# Patient Record
Sex: MALE | Race: AMERICAN INDIAN OR ALASKA NATIVE | NOT HISPANIC OR LATINO | URBAN - METROPOLITAN AREA
[De-identification: names, ages, dates, MRNs, and addresses within clinical notes are randomized per-mention and may not be internally consistent; named-entity substitution may affect disease eponyms.]

---

## 2024-09-07 ENCOUNTER — INPATIENT (INPATIENT)
Facility: HOSPITAL | Age: 49
LOS: 10 days | Discharge: NURSING, REHAB OR LONG-TERM CARE | DRG: 195 | End: 2024-09-18
Attending: INTERNAL MEDICINE | Admitting: STUDENT IN AN ORGANIZED HEALTH CARE EDUCATION/TRAINING PROGRAM
Payer: MEDICAID

## 2024-09-07 VITALS
SYSTOLIC BLOOD PRESSURE: 152 MMHG | WEIGHT: 149.91 LBS | HEART RATE: 93 BPM | RESPIRATION RATE: 25 BRPM | HEIGHT: 67 IN | DIASTOLIC BLOOD PRESSURE: 80 MMHG | OXYGEN SATURATION: 100 % | TEMPERATURE: 98 F

## 2024-09-07 DIAGNOSIS — J18.9 PNEUMONIA, UNSPECIFIED ORGANISM: ICD-10-CM

## 2024-09-07 LAB
ADD ON TEST-SPECIMEN IN LAB: SIGNIFICANT CHANGE UP
ALBUMIN SERPL ELPH-MCNC: 3.6 G/DL — SIGNIFICANT CHANGE UP (ref 3.3–5)
ALP SERPL-CCNC: 122 U/L — HIGH (ref 40–120)
ALT FLD-CCNC: 15 U/L — SIGNIFICANT CHANGE UP (ref 10–45)
ANION GAP SERPL CALC-SCNC: 13 MMOL/L — SIGNIFICANT CHANGE UP (ref 5–17)
APPEARANCE UR: CLEAR — SIGNIFICANT CHANGE UP
APTT BLD: 40.5 SEC — HIGH (ref 24.5–35.6)
AST SERPL-CCNC: 17 U/L — SIGNIFICANT CHANGE UP (ref 10–40)
BACTERIA # UR AUTO: NEGATIVE /HPF — SIGNIFICANT CHANGE UP
BASOPHILS # BLD AUTO: 0.09 K/UL — SIGNIFICANT CHANGE UP (ref 0–0.2)
BASOPHILS NFR BLD AUTO: 1.4 % — SIGNIFICANT CHANGE UP (ref 0–2)
BILIRUB SERPL-MCNC: 0.6 MG/DL — SIGNIFICANT CHANGE UP (ref 0.2–1.2)
BILIRUB UR-MCNC: NEGATIVE — SIGNIFICANT CHANGE UP
BUN SERPL-MCNC: 35 MG/DL — HIGH (ref 7–23)
CALCIUM SERPL-MCNC: 8.6 MG/DL — SIGNIFICANT CHANGE UP (ref 8.4–10.5)
CAST: 6 /LPF — HIGH (ref 0–4)
CHLORIDE SERPL-SCNC: 111 MMOL/L — HIGH (ref 96–108)
CO2 SERPL-SCNC: 17 MMOL/L — LOW (ref 22–31)
COLOR SPEC: YELLOW — SIGNIFICANT CHANGE UP
CREAT SERPL-MCNC: 3.27 MG/DL — HIGH (ref 0.5–1.3)
DIFF PNL FLD: NEGATIVE — SIGNIFICANT CHANGE UP
EGFR: 22 ML/MIN/1.73M2 — LOW
EOSINOPHIL # BLD AUTO: 0.56 K/UL — HIGH (ref 0–0.5)
EOSINOPHIL NFR BLD AUTO: 9 % — HIGH (ref 0–6)
FLUAV AG NPH QL: SIGNIFICANT CHANGE UP
FLUBV AG NPH QL: SIGNIFICANT CHANGE UP
GAS PNL BLDV: SIGNIFICANT CHANGE UP
GLUCOSE SERPL-MCNC: 105 MG/DL — HIGH (ref 70–99)
GLUCOSE UR QL: NEGATIVE MG/DL — SIGNIFICANT CHANGE UP
HCT VFR BLD CALC: 27.2 % — LOW (ref 39–50)
HGB BLD-MCNC: 8.5 G/DL — LOW (ref 13–17)
IMM GRANULOCYTES NFR BLD AUTO: 0.3 % — SIGNIFICANT CHANGE UP (ref 0–0.9)
INR BLD: 1.04 RATIO — SIGNIFICANT CHANGE UP (ref 0.85–1.18)
KETONES UR-MCNC: NEGATIVE MG/DL — SIGNIFICANT CHANGE UP
LEUKOCYTE ESTERASE UR-ACNC: NEGATIVE — SIGNIFICANT CHANGE UP
LYMPHOCYTES # BLD AUTO: 1.16 K/UL — SIGNIFICANT CHANGE UP (ref 1–3.3)
LYMPHOCYTES # BLD AUTO: 18.6 % — SIGNIFICANT CHANGE UP (ref 13–44)
MCHC RBC-ENTMCNC: 27.8 PG — SIGNIFICANT CHANGE UP (ref 27–34)
MCHC RBC-ENTMCNC: 31.3 GM/DL — LOW (ref 32–36)
MCV RBC AUTO: 88.9 FL — SIGNIFICANT CHANGE UP (ref 80–100)
MONOCYTES # BLD AUTO: 0.72 K/UL — SIGNIFICANT CHANGE UP (ref 0–0.9)
MONOCYTES NFR BLD AUTO: 11.5 % — SIGNIFICANT CHANGE UP (ref 2–14)
NEUTROPHILS # BLD AUTO: 3.69 K/UL — SIGNIFICANT CHANGE UP (ref 1.8–7.4)
NEUTROPHILS NFR BLD AUTO: 59.2 % — SIGNIFICANT CHANGE UP (ref 43–77)
NITRITE UR-MCNC: NEGATIVE — SIGNIFICANT CHANGE UP
NRBC # BLD: 0 /100 WBCS — SIGNIFICANT CHANGE UP (ref 0–0)
PH UR: 6 — SIGNIFICANT CHANGE UP (ref 5–8)
PLATELET # BLD AUTO: 199 K/UL — SIGNIFICANT CHANGE UP (ref 150–400)
POTASSIUM SERPL-MCNC: 5 MMOL/L — SIGNIFICANT CHANGE UP (ref 3.5–5.3)
POTASSIUM SERPL-SCNC: 5 MMOL/L — SIGNIFICANT CHANGE UP (ref 3.5–5.3)
PROT SERPL-MCNC: 6.8 G/DL — SIGNIFICANT CHANGE UP (ref 6–8.3)
PROT UR-MCNC: 300 MG/DL
PROTHROM AB SERPL-ACNC: 11.4 SEC — SIGNIFICANT CHANGE UP (ref 9.5–13)
RBC # BLD: 3.06 M/UL — LOW (ref 4.2–5.8)
RBC # FLD: 15.3 % — HIGH (ref 10.3–14.5)
RBC CASTS # UR COMP ASSIST: 1 /HPF — SIGNIFICANT CHANGE UP (ref 0–4)
REVIEW: SIGNIFICANT CHANGE UP
RSV RNA NPH QL NAA+NON-PROBE: SIGNIFICANT CHANGE UP
SARS-COV-2 RNA SPEC QL NAA+PROBE: DETECTED
SODIUM SERPL-SCNC: 141 MMOL/L — SIGNIFICANT CHANGE UP (ref 135–145)
SP GR SPEC: 1.02 — SIGNIFICANT CHANGE UP (ref 1–1.03)
SQUAMOUS # UR AUTO: 3 /HPF — SIGNIFICANT CHANGE UP (ref 0–5)
TROPONIN T, HIGH SENSITIVITY RESULT: 143 NG/L — HIGH (ref 0–51)
TROPONIN T, HIGH SENSITIVITY RESULT: 159 NG/L — HIGH (ref 0–51)
UROBILINOGEN FLD QL: 0.2 MG/DL — SIGNIFICANT CHANGE UP (ref 0.2–1)
WBC # BLD: 6.24 K/UL — SIGNIFICANT CHANGE UP (ref 3.8–10.5)
WBC # FLD AUTO: 6.24 K/UL — SIGNIFICANT CHANGE UP (ref 3.8–10.5)
WBC UR QL: 13 /HPF — HIGH (ref 0–5)

## 2024-09-07 PROCEDURE — 99291 CRITICAL CARE FIRST HOUR: CPT

## 2024-09-07 PROCEDURE — 71045 X-RAY EXAM CHEST 1 VIEW: CPT | Mod: 26

## 2024-09-07 RX ORDER — SODIUM CHLORIDE 9 MG/ML
1000 INJECTION INTRAMUSCULAR; INTRAVENOUS; SUBCUTANEOUS ONCE
Refills: 0 | Status: COMPLETED | OUTPATIENT
Start: 2024-09-07 | End: 2024-09-07

## 2024-09-07 RX ORDER — MEROPENEM 500 MG/10ML
1000 INJECTION, POWDER, FOR SOLUTION INTRAVENOUS ONCE
Refills: 0 | Status: COMPLETED | OUTPATIENT
Start: 2024-09-07 | End: 2024-09-07

## 2024-09-07 RX ADMIN — MEROPENEM 100 MILLIGRAM(S): 500 INJECTION, POWDER, FOR SOLUTION INTRAVENOUS at 20:14

## 2024-09-07 RX ADMIN — SODIUM CHLORIDE 1000 MILLILITER(S): 9 INJECTION INTRAMUSCULAR; INTRAVENOUS; SUBCUTANEOUS at 22:40

## 2024-09-07 NOTE — ED ADULT NURSE NOTE - OBJECTIVE STATEMENT
48 yo M El speaking (nonverbal baseline) BIB EMS from [facility] with PMH of Hemiplegia and hemiparesis s/p cerebral infarction, diabetes, hypertension, CVA, hemiplegia with recent hospitalization for pneumonia, treated with meropenem. As per EMS, hey were called by nursing home for sob. As per EMS, pt had oxygen saturation in the 80"s% on RA. Pt able to follow commands.    Pt presents with gastric tube. Pt has contracture of right arm. Pt presents with NRB 15L with oxygen saturation of 100%. As per MD Lackey, pt placed on 6L NC with oxygen saturation 97%. Pt straight cath as per MD order. 2 RN at bedside to maintain sterility. 50 cc drained. Pt tolerated well. UA/UC sent. Pt repositioned for comfort. Comfort and safety measures maintained. Unable to obtain pt subjective symptoms due to pt's status of nonverbal. 50 yo M DNR/DNI (nonverbal baseline) BIB EMS from [facility] with PMH of Hemiplegia and hemiparesis s/p cerebral infarction, diabetes, hypertension, CVA, hemiplegia with recent hospitalization for pneumonia, treated with meropenem. As per EMS, hey were called by nursing home for sob. As per EMS, pt had oxygen saturation in the 80"s% on RA. Pt able to follow commands. Pt presents with gastric tube. Pt has contracture of right arm. Pt presents with NRB 15L with oxygen saturation of 100%. As per MD Lackey, pt placed on 6L NC with oxygen saturation 97%. Pt straight cath as per MD order. 2 RN at bedside to maintain sterility. 50 cc drained. Pt tolerated well. UA/UC sent. Pt repositioned for comfort. Comfort and safety measures maintained. Unable to obtain pt subjective symptoms due to pt's status of nonverbal.

## 2024-09-07 NOTE — ED PROVIDER NOTE - ATTENDING CONTRIBUTION TO CARE
50 yo male, hx of CVA, xfer from facility for SOB, hyoxia.  EMS @ bedside for collateral.  patient minimally verbal.  NH records reviewed, recently was treated with course of meropenem for pneumonia in facility.    hypoxic here as well, improved initially on 6L, titrated down to on 4L nasal cannula.  CBC, CMP sent.    chest x-ray independently reviewed by myself, no evidence of pneumothorax, no evidence of PNA.  however, given recent history, hypoxia, will empirically give IV abx here.  low suspicion for PE, ACS.    COVID/flu/RSV sent and pending.  d/w medicine --> will admit for further management.

## 2024-09-07 NOTE — ED PROVIDER NOTE - CLINICAL SUMMARY MEDICAL DECISION MAKING FREE TEXT BOX
Medhat Eric MD, PGY3  49-year-old male with past medical history of diabetes, hypertension, CVA, hemiplegia with recent hospitalization for pneumonia, treated with meropenem presenting to emergency department brought in by EMS from [facility] for shortness of breath, difficulty breathing.  As per EMS they also reported cough at nursing home.  Patient hypoxic in 80s at nursing home on room air.  Put on nonrebreather by EMS.  Patient unable to provide additional history.  Nonverbal at baseline.  Patient following commands.  No additional history able to be obtained from patient.    Gen: Non verbal, contracted  HEENT: EOMI, no nasal discharge, mucous membranes moist  CV: RRR, +S1/S2, no M/R/G, 2+ radial pulses b/l  Resp: Coarse lung sounds b/l with tachypnea.   GI: Abdomen soft non-distended, NTTP  MSK:  no LE edema  Neuro: following commands    In the emergency department patient tachypneic to 24, afebrile, will obtain rectal temperature.  Patient otherwise hemodynamically stable.  Patient saturating 98% on 4 L nasal cannula.  On exam patient has coarse lung sounds bilaterally.  Rest of exam as noted above.  Differential including but not limited to pneumonia, viral illness, ACS, urinary tract infection.  Will obtain labs, cultures, urine, chest x-ray, give antibiotics.  Will reassess, patient will likely require admission. Medhat Eric MD, PGY3  49-year-old male with past medical history of diabetes, hypertension, CVA, hemiplegia with recent hospitalization for pneumonia, treated with meropenem presenting to emergency department brought in by EMS from [facility] for shortness of breath, difficulty breathing.  As per EMS they also reported cough at nursing home.  Patient hypoxic in 80s at nursing home on room air.  Put on nonrebreather by EMS.  Patient unable to provide additional history.  Nonverbal at baseline.  Patient following commands.  No additional history able to be obtained from patient.    Gen: Non verbal, contracted  HEENT: EOMI, no nasal discharge, mucous membranes moist  CV: RRR, +S1/S2, no M/R/G, 2+ radial pulses b/l  Resp: Coarse lung sounds b/l with tachypnea.   GI: Abdomen soft non-distended, NTTP  MSK:  no LE edema  Neuro: following commands    In the emergency department patient tachypneic to 24, afebrile, will obtain rectal temperature.  Patient otherwise hemodynamically stable.  Patient saturating 98% on 4 L nasal cannula.  On exam patient has coarse lung sounds bilaterally.  Rest of exam as noted above.  Differential including but not limited to pneumonia, viral illness, ACS, urinary tract infection.  Will obtain labs, cultures, urine, chest x-ray, give antibiotics.  Will reassess, patient will likely require admission.    see attending attestation authored by me, Oracio Lackey MD, for further MDM details.

## 2024-09-07 NOTE — ED ADULT NURSE NOTE - PAIN RATING/NUMBER SCALE (0-10): ACTIVITY
Is This A New Presentation, Or A Follow-Up?: Skin Lesions 0 (no pain/absence of nonverbal indicators of pain)

## 2024-09-07 NOTE — ED ADULT NURSE NOTE - CAS EDN DISCHARGE ASSESSMENT
Alert and oriented to person, place and time/Patient baseline mental status Patient baseline mental status/Awake

## 2024-09-08 DIAGNOSIS — E11.9 TYPE 2 DIABETES MELLITUS WITHOUT COMPLICATIONS: ICD-10-CM

## 2024-09-08 DIAGNOSIS — I10 ESSENTIAL (PRIMARY) HYPERTENSION: ICD-10-CM

## 2024-09-08 DIAGNOSIS — N18.30 CHRONIC KIDNEY DISEASE, STAGE 3 UNSPECIFIED: ICD-10-CM

## 2024-09-08 DIAGNOSIS — Z93.1 GASTROSTOMY STATUS: Chronic | ICD-10-CM

## 2024-09-08 DIAGNOSIS — Z29.9 ENCOUNTER FOR PROPHYLACTIC MEASURES, UNSPECIFIED: ICD-10-CM

## 2024-09-08 DIAGNOSIS — D50.9 IRON DEFICIENCY ANEMIA, UNSPECIFIED: ICD-10-CM

## 2024-09-08 DIAGNOSIS — E78.5 HYPERLIPIDEMIA, UNSPECIFIED: ICD-10-CM

## 2024-09-08 DIAGNOSIS — U07.1 COVID-19: ICD-10-CM

## 2024-09-08 DIAGNOSIS — I69.391 DYSPHAGIA FOLLOWING CEREBRAL INFARCTION: ICD-10-CM

## 2024-09-08 DIAGNOSIS — Z86.79 PERSONAL HISTORY OF OTHER DISEASES OF THE CIRCULATORY SYSTEM: ICD-10-CM

## 2024-09-08 LAB
ALBUMIN SERPL ELPH-MCNC: 3.7 G/DL — SIGNIFICANT CHANGE UP (ref 3.3–5)
ALP SERPL-CCNC: 123 U/L — HIGH (ref 40–120)
ALT FLD-CCNC: 16 U/L — SIGNIFICANT CHANGE UP (ref 10–45)
ANION GAP SERPL CALC-SCNC: 17 MMOL/L — SIGNIFICANT CHANGE UP (ref 5–17)
AST SERPL-CCNC: 20 U/L — SIGNIFICANT CHANGE UP (ref 10–40)
BILIRUB SERPL-MCNC: 0.7 MG/DL — SIGNIFICANT CHANGE UP (ref 0.2–1.2)
BUN SERPL-MCNC: 36 MG/DL — HIGH (ref 7–23)
CALCIUM SERPL-MCNC: 8.3 MG/DL — LOW (ref 8.4–10.5)
CALCIUM SERPL-MCNC: 8.5 MG/DL — SIGNIFICANT CHANGE UP (ref 8.4–10.5)
CHLORIDE SERPL-SCNC: 113 MMOL/L — HIGH (ref 96–108)
CO2 SERPL-SCNC: 15 MMOL/L — LOW (ref 22–31)
CREAT ?TM UR-MCNC: 85 MG/DL — SIGNIFICANT CHANGE UP
CREAT SERPL-MCNC: 3.16 MG/DL — HIGH (ref 0.5–1.3)
EGFR: 23 ML/MIN/1.73M2 — LOW
GLUCOSE BLDC GLUCOMTR-MCNC: 107 MG/DL — HIGH (ref 70–99)
GLUCOSE SERPL-MCNC: 83 MG/DL — SIGNIFICANT CHANGE UP (ref 70–99)
HCT VFR BLD CALC: 26.7 % — LOW (ref 39–50)
HGB BLD-MCNC: 8.3 G/DL — LOW (ref 13–17)
MCHC RBC-ENTMCNC: 28.6 PG — SIGNIFICANT CHANGE UP (ref 27–34)
MCHC RBC-ENTMCNC: 31.1 GM/DL — LOW (ref 32–36)
MCV RBC AUTO: 92.1 FL — SIGNIFICANT CHANGE UP (ref 80–100)
NRBC # BLD: 0 /100 WBCS — SIGNIFICANT CHANGE UP (ref 0–0)
PLATELET # BLD AUTO: 196 K/UL — SIGNIFICANT CHANGE UP (ref 150–400)
POTASSIUM SERPL-MCNC: 4.9 MMOL/L — SIGNIFICANT CHANGE UP (ref 3.5–5.3)
POTASSIUM SERPL-SCNC: 4.9 MMOL/L — SIGNIFICANT CHANGE UP (ref 3.5–5.3)
PROT ?TM UR-MCNC: 466 MG/DL — HIGH (ref 0–12)
PROT SERPL-MCNC: 6.7 G/DL — SIGNIFICANT CHANGE UP (ref 6–8.3)
PROT/CREAT UR-RTO: 5.5 RATIO — HIGH (ref 0–0.2)
PTH-INTACT FLD-MCNC: 144 PG/ML — HIGH (ref 15–65)
RBC # BLD: 2.9 M/UL — LOW (ref 4.2–5.8)
RBC # FLD: 14.9 % — HIGH (ref 10.3–14.5)
SODIUM SERPL-SCNC: 145 MMOL/L — SIGNIFICANT CHANGE UP (ref 135–145)
SODIUM UR-SCNC: 95 MMOL/L — SIGNIFICANT CHANGE UP
URATE SERPL-MCNC: 8.2 MG/DL — SIGNIFICANT CHANGE UP (ref 3.4–8.8)
WBC # BLD: 7.07 K/UL — SIGNIFICANT CHANGE UP (ref 3.8–10.5)
WBC # FLD AUTO: 7.07 K/UL — SIGNIFICANT CHANGE UP (ref 3.8–10.5)

## 2024-09-08 PROCEDURE — 99254 IP/OBS CNSLTJ NEW/EST MOD 60: CPT

## 2024-09-08 PROCEDURE — 99223 1ST HOSP IP/OBS HIGH 75: CPT

## 2024-09-08 PROCEDURE — 99497 ADVNCD CARE PLAN 30 MIN: CPT | Mod: 25

## 2024-09-08 RX ORDER — REMDESIVIR 5 MG/ML
100 INJECTION INTRAVENOUS EVERY 24 HOURS
Refills: 0 | Status: DISCONTINUED | OUTPATIENT
Start: 2024-09-09 | End: 2024-09-11

## 2024-09-08 RX ORDER — ISOSORBIDE DINITRATE 10 MG
20 TABLET ORAL THREE TIMES A DAY
Refills: 0 | Status: DISCONTINUED | OUTPATIENT
Start: 2024-09-08 | End: 2024-09-18

## 2024-09-08 RX ORDER — FERROUS SULFATE 325(65) MG
300 TABLET ORAL DAILY
Refills: 0 | Status: DISCONTINUED | OUTPATIENT
Start: 2024-09-08 | End: 2024-09-10

## 2024-09-08 RX ORDER — SENNA 187 MG
2 TABLET ORAL AT BEDTIME
Refills: 0 | Status: DISCONTINUED | OUTPATIENT
Start: 2024-09-08 | End: 2024-09-11

## 2024-09-08 RX ORDER — ISOSORBIDE DINITRATE 10 MG
1 TABLET ORAL
Refills: 0 | DISCHARGE

## 2024-09-08 RX ORDER — DEXTROSE 15 G/33 G
25 GEL IN PACKET (GRAM) ORAL ONCE
Refills: 0 | Status: DISCONTINUED | OUTPATIENT
Start: 2024-09-08 | End: 2024-09-18

## 2024-09-08 RX ORDER — DEXTROSE 15 G/33 G
12.5 GEL IN PACKET (GRAM) ORAL ONCE
Refills: 0 | Status: DISCONTINUED | OUTPATIENT
Start: 2024-09-08 | End: 2024-09-18

## 2024-09-08 RX ORDER — DEXAMETHASONE 0.75 MG
6 TABLET ORAL DAILY
Refills: 0 | Status: DISCONTINUED | OUTPATIENT
Start: 2024-09-08 | End: 2024-09-11

## 2024-09-08 RX ORDER — AMLODIPINE BESYLATE 10 MG/1
10 TABLET ORAL DAILY
Refills: 0 | Status: DISCONTINUED | OUTPATIENT
Start: 2024-09-08 | End: 2024-09-18

## 2024-09-08 RX ORDER — ASCORBIC ACID/ASCORBATE SODIUM 500 MG
2 TABLET,CHEWABLE ORAL
Refills: 0 | DISCHARGE

## 2024-09-08 RX ORDER — GLUCAGON INJECTION, SOLUTION 1 MG/.2ML
1 INJECTION, SOLUTION SUBCUTANEOUS ONCE
Refills: 0 | Status: DISCONTINUED | OUTPATIENT
Start: 2024-09-08 | End: 2024-09-18

## 2024-09-08 RX ORDER — ACETAMINOPHEN 325 MG/1
650 TABLET ORAL EVERY 6 HOURS
Refills: 0 | Status: DISCONTINUED | OUTPATIENT
Start: 2024-09-08 | End: 2024-09-18

## 2024-09-08 RX ORDER — ASPIRIN 81 MG
81 TABLET, DELAYED RELEASE (ENTERIC COATED) ORAL DAILY
Refills: 0 | Status: DISCONTINUED | OUTPATIENT
Start: 2024-09-08 | End: 2024-09-15

## 2024-09-08 RX ORDER — REMDESIVIR 5 MG/ML
INJECTION INTRAVENOUS
Refills: 0 | Status: DISCONTINUED | OUTPATIENT
Start: 2024-09-08 | End: 2024-09-11

## 2024-09-08 RX ORDER — HYDRALAZINE HCL 50 MG
25 TABLET ORAL
Refills: 0 | Status: DISCONTINUED | OUTPATIENT
Start: 2024-09-08 | End: 2024-09-10

## 2024-09-08 RX ORDER — SODIUM BICARBONATE 84 MG/ML
1300 INJECTION, SOLUTION INTRAVENOUS THREE TIMES A DAY
Refills: 0 | Status: DISCONTINUED | OUTPATIENT
Start: 2024-09-08 | End: 2024-09-09

## 2024-09-08 RX ORDER — ASCORBIC ACID/ASCORBATE SODIUM 500 MG
500 TABLET,CHEWABLE ORAL DAILY
Refills: 0 | Status: DISCONTINUED | OUTPATIENT
Start: 2024-09-08 | End: 2024-09-08

## 2024-09-08 RX ORDER — HEPARIN SODIUM,BOVINE 1000/ML
5000 VIAL (ML) INJECTION EVERY 8 HOURS
Refills: 0 | Status: DISCONTINUED | OUTPATIENT
Start: 2024-09-08 | End: 2024-09-08

## 2024-09-08 RX ORDER — HEPARIN SODIUM,BOVINE 1000/ML
5000 VIAL (ML) INJECTION EVERY 12 HOURS
Refills: 0 | Status: DISCONTINUED | OUTPATIENT
Start: 2024-09-08 | End: 2024-09-18

## 2024-09-08 RX ORDER — DEXTROSE 15 G/33 G
15 GEL IN PACKET (GRAM) ORAL ONCE
Refills: 0 | Status: DISCONTINUED | OUTPATIENT
Start: 2024-09-08 | End: 2024-09-18

## 2024-09-08 RX ORDER — REMDESIVIR 5 MG/ML
200 INJECTION INTRAVENOUS EVERY 24 HOURS
Refills: 0 | Status: COMPLETED | OUTPATIENT
Start: 2024-09-08 | End: 2024-09-08

## 2024-09-08 RX ORDER — FERROUS SULFATE 325(65) MG
1 TABLET ORAL
Refills: 0 | DISCHARGE

## 2024-09-08 RX ORDER — IPRATROPIUM BROMIDE AND ALBUTEROL SULFATE .5; 3 MG/3ML; MG/3ML
3 SOLUTION RESPIRATORY (INHALATION) EVERY 6 HOURS
Refills: 0 | Status: DISCONTINUED | OUTPATIENT
Start: 2024-09-08 | End: 2024-09-08

## 2024-09-08 RX ADMIN — Medication 25 MILLIGRAM(S): at 21:22

## 2024-09-08 RX ADMIN — Medication 500 MILLIGRAM(S): at 10:05

## 2024-09-08 RX ADMIN — Medication 81 MILLIGRAM(S): at 10:05

## 2024-09-08 RX ADMIN — Medication 20 MILLIGRAM(S): at 18:11

## 2024-09-08 RX ADMIN — Medication 300 MILLIGRAM(S): at 10:05

## 2024-09-08 RX ADMIN — Medication 2 PUFF(S): at 21:22

## 2024-09-08 RX ADMIN — Medication 6 MILLIGRAM(S): at 05:13

## 2024-09-08 RX ADMIN — Medication 300 MILLIGRAM(S): at 17:41

## 2024-09-08 RX ADMIN — Medication 50 MILLILITER(S): at 05:16

## 2024-09-08 RX ADMIN — Medication 60 MILLILITER(S): at 21:08

## 2024-09-08 RX ADMIN — IPRATROPIUM BROMIDE AND ALBUTEROL SULFATE 3 MILLILITER(S): .5; 3 SOLUTION RESPIRATORY (INHALATION) at 10:04

## 2024-09-08 RX ADMIN — Medication 40 MILLIGRAM(S): at 21:09

## 2024-09-08 RX ADMIN — SODIUM BICARBONATE 1300 MILLIGRAM(S): 84 INJECTION, SOLUTION INTRAVENOUS at 21:09

## 2024-09-08 RX ADMIN — SODIUM BICARBONATE 1300 MILLIGRAM(S): 84 INJECTION, SOLUTION INTRAVENOUS at 17:41

## 2024-09-08 RX ADMIN — Medication 2 TABLET(S): at 21:09

## 2024-09-08 RX ADMIN — REMDESIVIR 200 MILLIGRAM(S): 5 INJECTION INTRAVENOUS at 10:04

## 2024-09-08 RX ADMIN — Medication 2 PUFF(S): at 15:11

## 2024-09-08 RX ADMIN — Medication 25 MILLIGRAM(S): at 17:41

## 2024-09-08 RX ADMIN — Medication 5000 UNIT(S): at 17:44

## 2024-09-08 NOTE — H&P ADULT - NSHPADDITIONALINFOADULT_GEN_ALL_CORE
Discussed with Dr. Vasquez who requested for initial evaluation/H&P and will assume care of this patient later today

## 2024-09-08 NOTE — CONSULT NOTE ADULT - ASSESSMENT
49-year-old male with past medical history significant for diabetes, hypertension, CVA, hemiplegia with recent hospitalization for pneumonia undergoing course of meropenem who was admitted to the hospital from nursing home due to shortness of breath.    #Acute hypoxic respiratory failure secondary to COVID-19  #QUITA  #Pyuria    Recommendations  Continue remdesivir, dexamethasone  Plan for 5-day remdesivir course  Plan for 10-day dexamethasone course  Maintain strict resolution precautions  Wean oxygen as able  Would not treat pyuria noted in UA  Follow fever curve and WBC count    Aazel Acosta MD  Division of Infectious Diseases

## 2024-09-08 NOTE — H&P ADULT - CONVERSATION DETAILS
Humble Gonzalez ([phone]) I personally spent 16 minutes face-to-face time in discussion of advance directives including but not limited to DNR/DNI and plan of care in details with patient.  His next of kin is sister in [facility], Humble Gonzalez ([phone]) who signed the original MOLST form in Oct 2023.  MOLST form reviewed and confirmed his wishes, which remain DNR/DNI.

## 2024-09-08 NOTE — H&P ADULT - PROBLEM SELECTOR PLAN 4
- will monitor fingersticks on steroid  - will cover with low insulin sliding scales - will monitor fingersticks on steroid  - will cover with low insulin sliding scales once tube feed started

## 2024-09-08 NOTE — H&P ADULT - PROBLEM SELECTOR PLAN 6
- will continue Labetolol, Isosorbide and Amlodipine - will continue Labetalol, Isosorbide and Amlodipine

## 2024-09-08 NOTE — CONSULT NOTE ADULT - SUBJECTIVE AND OBJECTIVE BOX
Date of Service, 09-08-24 @ 13:48  CHIEF COMPLAINT:Patient is a 49y old  Male who presents with a chief complaint of SOB (08 Sep 2024 05:36)      HPI:  49-year-old male with past medical history of diabetes, hypertension, HLD. CVA with right sided hemiplegia, non verbal, dysphagia on PEG tube, recently treated pneumonia with meropenem (8/27-9/2) brought in by EMS from [facility] for difficulty breathing and hypoxia 85% RA, difficulty breathing per EMS and nursing home documentation.  Patient unable to provide additional history.         PAST MEDICAL & SURGICAL HISTORY:  CVA (cerebrovascular accident)  HTN (hypertension)  PAF (paroxysmal atrial fibrillation)  Type 2 diabetes mellitus  Stage 3 chronic kidney disease  NSTEMI (non-ST elevation myocardial infarction)  History of pleural effusion  S/P percutaneous endoscopic gastrostomy (PEG) tube placement  AGUSTIN (iron deficiency anemia)  Anxiety and depression  Aphasia  HLD (hyperlipidemia)  S/P percutaneous endoscopic gastrostomy (PEG) tube placement      MEDICATIONS  (STANDING):  albuterol    90 MICROgram(s) HFA Inhaler 2 Puff(s) Inhalation every 8 hours  amLODIPine   Tablet 10 milliGRAM(s) Oral daily  aspirin  chewable 81 milliGRAM(s) Oral daily  atorvastatin 40 milliGRAM(s) Oral at bedtime  dexAMETHasone  Injectable 6 milliGRAM(s) IV Push daily  dextrose 5%. 1000 milliLiter(s) (50 mL/Hr) IV Continuous <Continuous>  dextrose 5%. 1000 milliLiter(s) (100 mL/Hr) IV Continuous <Continuous>  dextrose 50% Injectable 12.5 Gram(s) IV Push once  dextrose 50% Injectable 25 Gram(s) IV Push once  dextrose 50% Injectable 25 Gram(s) IV Push once  ferrous    sulfate Liquid 300 milliGRAM(s) Enteral Tube daily  glucagon  Injectable 1 milliGRAM(s) IntraMuscular once  hydrALAZINE 25 milliGRAM(s) Oral two times a day  isosorbide   dinitrate Tablet (ISORDIL) 20 milliGRAM(s) Oral three times a day  labetalol 300 milliGRAM(s) Oral two times a day  QUEtiapine 25 milliGRAM(s) Oral two times a day  remdesivir  IVPB   IV Intermittent   senna 2 Tablet(s) Oral at bedtime  sodium bicarbonate 1300 milliGRAM(s) Oral three times a day    MEDICATIONS  (PRN):  dextrose Oral Gel 15 Gram(s) Oral once PRN Blood Glucose LESS THAN 70 milliGRAM(s)/deciliter      FAMILY HISTORY:      SOCIAL HISTORY:    [x ] Non-smoker  [ ] Smoker  [ ] Alcohol    Allergies    No Known Allergies    Intolerances    	    REVIEW OF SYSTEMS:  CONSTITUTIONAL: No fever, weight loss, or fatigue  EYES: No eye pain, visual disturbances, or discharge  ENT:  No difficulty hearing, tinnitus, vertigo; No sinus or throat pain  NECK: No pain or stiffness  RESPIRATORY: No cough, wheezing, chills or hemoptysis; + Shortness of Breath  CARDIOVASCULAR: No chest pain, palpitations, passing out, dizziness, or leg swelling  GASTROINTESTINAL: No abdominal or epigastric pain. No nausea, vomiting, or hematemesis; No diarrhea or constipation. No melena or hematochezia.  GENITOURINARY: No dysuria, frequency, hematuria, or incontinence  NEUROLOGICAL: No headaches, memory loss, loss of strength, numbness, or tremors  SKIN: No itching, burning, rashes, or lesions   LYMPH Nodes: No enlarged glands  ENDOCRINE: No heat or cold intolerance; No hair loss  MUSCULOSKELETAL: No joint pain or swelling; No muscle, back, or extremity pain  PSYCHIATRIC: No depression, anxiety, mood swings, or difficulty sleeping  HEME/LYMPH: No easy bruising, or bleeding gums  ALLERGY AND IMMUNOLOGIC: No hives or eczema	    [ ] All others negative	  [x ] Unable to obtain    PHYSICAL EXAM:  T(C): 37.1 (09-08-24 @ 09:05), Max: 37.1 (09-08-24 @ 06:43)  HR: 99 (09-08-24 @ 09:05) (67 - 99)  BP: 139/88 (09-08-24 @ 09:05) (139/88 - 175/82)  RR: 18 (09-08-24 @ 09:05) (18 - 25)  SpO2: 100% (09-08-24 @ 09:05) (95% - 100%)  Wt(kg): --  I&O's Summary    07 Sep 2024 07:01  -  08 Sep 2024 07:00  --------------------------------------------------------  IN: 50 mL / OUT: 0 mL / NET: 50 mL    08 Sep 2024 07:01  -  08 Sep 2024 13:48  --------------------------------------------------------  IN: 0 mL / OUT: 400 mL / NET: -400 mL        Appearance: non verbal  HEENT:   Normal oral mucosa, PERRL, EOMI	  Lymphatic: No lymphadenopathy  Cardiovascular: Normal S1 S2, No JVD, + murmurs, No edema  Respiratory: rhonchi  Psychiatry: non verbal  Gastrointestinal:  Soft, Non-tender, + BS	  Skin: No rashes, No ecchymoses, No cyanosis	  Extremities: Normal range of motion, No clubbing, cyanosis or edema  Vascular: Peripheral pulses palpable 2+ bilaterally    TELEMETRY: 	    ECG:  	  RADIOLOGY:  OTHER: 	  	  LABS:	 	    CARDIAC MARKERS:                        8.3    7.07  )-----------( 196      ( 08 Sep 2024 07:24 )             26.7     09-08    145  |  113<H>  |  36<H>  ----------------------------<  83  4.9   |  15<L>  |  3.16<H>    Ca    8.5      08 Sep 2024 07:24    TPro  6.7  /  Alb  3.7  /  TBili  0.7  /  DBili  x   /  AST  20  /  ALT  16  /  AlkPhos  123<H>  09-08    proBNP:   Lipid Profile:   HgA1c:   TSH:   PT/INR - ( 07 Sep 2024 19:41 )   PT: 11.4 sec;   INR: 1.04 ratio         PTT - ( 07 Sep 2024 19:41 )  PTT:40.5 sec    PREVIOUS DIAGNOSTIC TESTING:    < from: Xray Chest 1 View- PORTABLE-Urgent (09.07.24 @ 21:55) >  The heart size is normal.  Increased haziness to the right lower lung which may be related to   positioning/body habitus. No focal consolidation  There is no pneumothorax or pleural effusion.    IMPRESSION:  No focal consolidation.    < from: Xray Chest 1 View- PORTABLE-Urgent (09.07.24 @ 21:55) >  The heart size is normal.  Increased haziness to the right lower lung which may be related to   positioning/body habitus. No focal consolidation  There is no pneumothorax or pleural effusion.    IMPRESSION:  No focal consolidation.      FluA/FluB/RSV/COVID PCR (09.07.24 @ 19:39)    SARS-CoV-2 Result: Detected: This Respiratory Panel uses polymerase chain reaction (PCR) to detect for  influenza A; influenza B; respiratory syncytial virus; and SARS-CoV-2.  This test was validated by [facility] and is in use under the FDA  Emergency Use Authorization (EUA) for clinical labs CLIA-certified to  perform high complexity testing. Test results should be correlated with  clinical presentation, patient history, and epidemiology.   Influenza A Result: NotDetec   Influenza B Result: NotDetec   Resp Syn Virus Result: NotDetec

## 2024-09-08 NOTE — CONSULT NOTE ADULT - ASSESSMENT
49-year-old male with past medical history of diabetes, hypertension, HLD. CVA with right sided hemiplegia, non verbal, dysphagia on PEG tube, recently treated pneumonia with meropenem (8/27-9/2) brought in by EMS from [facility] for difficulty breathing and hypoxia 85% RA, difficulty breathing per EMS and nursing home documentation.  Patient unable to provide additional history.     pt with sig medical and cardiac hx with increasing sob, COVID +, recently treated with pneumoniae  check d dimer if elevated , vq scan to r/o PE  echo  ID eval RDV increase creatinine renal eval  pt with hx of PAF ?AC will discus with Dr Prasad  CAD/ ASHD continue cardiac meds will adjust

## 2024-09-08 NOTE — H&P ADULT - NSICDXPASTMEDICALHX_GEN_ALL_CORE_FT
PAST MEDICAL HISTORY:  Anxiety and depression     Aphasia     CVA (cerebrovascular accident)     History of pleural effusion     HLD (hyperlipidemia)     HTN (hypertension)     AGUSTIN (iron deficiency anemia)     NSTEMI (non-ST elevation myocardial infarction)     PAF (paroxysmal atrial fibrillation)     S/P percutaneous endoscopic gastrostomy (PEG) tube placement     Stage 3 chronic kidney disease     Type 2 diabetes mellitus

## 2024-09-08 NOTE — H&P ADULT - PROBLEM SELECTOR PLAN 1
s/p a course of Invanz (8/27-9/2) for PNA  - will treat for s/p a course of Invanz (8/27-9/2) for PNA  - will treat with steroid and Remdesivir (ccl 27)  - Duoneb ATC, O2 supplementation, currently on 2L NC  - DNR/DNI

## 2024-09-08 NOTE — H&P ADULT - PROBLEM SELECTOR PLAN 3
s/p PEG  - aspiration precaution  - nutrition eval for tube feed s/p PEG  - aspiration precaution  - NPO pending nutrition eval for tube feed

## 2024-09-08 NOTE — H&P ADULT - ASSESSMENT
49-year-old male with past medical history of diabetes, hypertension, HLD. CVA with right sided hemiplegia, non verbal, dysphagia on PEG tube, recently treated pneumonia with meropenem (8/27-9/2) presents with respiratory distress and hypoxia from nursing home admitted with acute hypoxic respiratory failure due to COVID-19 infection

## 2024-09-08 NOTE — H&P ADULT - HISTORY OF PRESENT ILLNESS
49-year-old male with past medical history of diabetes, hypertension, CVA, hemiplegia with recent hospitalization for pneumonia, treated with meropenem presenting to emergency department brought in by EMS from [facility] for shortness of breath, difficulty breathing.  As per EMS they also reported cough at nursing home.  Patient hypoxic in 80s at nursing home on room air.  Put on nonrebreather by EMS.  Patient unable to provide additional history.  Nonverbal at baseline.  Patient following commands.  No additional history able to be obtained from patient. 49-year-old male with past medical history of diabetes, hypertension, HLD. CVA with right sided hemiplegia, non verbal, dysphagia on PEG tube, recently treated pneumonia with meropenem (8/27-9/2) brought in by EMS from [facility] for difficulty breathing and hypoxia 85% RA, difficulty breathing per EMS and nursing home documentation.  Patient unable to provide additional history.

## 2024-09-08 NOTE — CONSULT NOTE ADULT - SUBJECTIVE AND OBJECTIVE BOX
Patient is a 49y old  Male who presents with a chief complaint of SOB (08 Sep 2024 16:20)    HPI:  41-year-old male with past medical history significant for diabetes, hypertension, CVA, hemiplegia with recent hospitalization for pneumonia undergoing course of meropenem who was admitted to the hospital from nursing home due to shortness of breath.    Prior to arrival to hospital, patient noted to be hypoxic in the 80s requiring nonrebreather.  Patient is nonverbal at baseline unable to provide history.    Upon admission, patient is afebrile.  Otherwise hemodynamically stable, requiring 2 L nasal cannula.  Latest labs show no leukocytosis, anemia 0.3/26.7, BMP with elevated creatinine at 3.1, .  Urinalysis with 13 WBCs, chest x-ray shows no focal consolidation.      prior hospital charts reviewed [ x ]  primary team notes reviewed [x  ]  other consultant notes reviewed [x  ]    PAST MEDICAL & SURGICAL HISTORY:  CVA (cerebrovascular accident)      HTN (hypertension)      PAF (paroxysmal atrial fibrillation)      Type 2 diabetes mellitus      Stage 3 chronic kidney disease      NSTEMI (non-ST elevation myocardial infarction)      History of pleural effusion      S/P percutaneous endoscopic gastrostomy (PEG) tube placement      AGUSTIN (iron deficiency anemia)      Anxiety and depression      Aphasia      HLD (hyperlipidemia)      S/P percutaneous endoscopic gastrostomy (PEG) tube placement          Allergies  No Known Allergies    ANTIMICROBIALS (past 90 days)  MEDICATIONS  (STANDING):  meropenem  IVPB   100 mL/Hr IV Intermittent (09-07-24 @ 20:14)    remdesivir  IVPB   200 mL/Hr IV Intermittent (09-08-24 @ 10:04)        remdesivir  IVPB      MEDICATIONS  (STANDING):  acetaminophen   Oral Liquid .. 650 every 6 hours PRN  albuterol    90 MICROgram(s) HFA Inhaler 2 every 8 hours  amLODIPine   Tablet 10 daily  aspirin  chewable 81 daily  atorvastatin 40 at bedtime  dexAMETHasone  Injectable 6 daily  dextrose 50% Injectable 12.5 once  dextrose 50% Injectable 25 once  dextrose 50% Injectable 25 once  dextrose Oral Gel 15 once PRN  glucagon  Injectable 1 once  heparin   Injectable 5000 every 12 hours  hydrALAZINE 25 two times a day  isosorbide   dinitrate Tablet (ISORDIL) 20 three times a day  labetalol 300 two times a day  QUEtiapine 25 two times a day  senna 2 at bedtime    SOCIAL HISTORY:     Lives at nursing home/    FAMILY HISTORY:  No reported family history in first degree relatives    REVIEW OF SYSTEMS  [x  ] ROS unobtainable because:  nonverbal  [  ] All other systems negative except as noted below:	    Constitutional:  [ ] fever [ ] chills  [ ] weight loss  [ ] weakness  Skin:  [ ] rash [ ] phlebitis	  Eyes: [ ] icterus [ ] pain  [ ] discharge	  ENMT: [ ] sore throat  [ ] thrush [ ] ulcers [ ] exudates  Respiratory: [ ] dyspnea [ ] hemoptysis [ ] cough [ ] sputum	  Cardiovascular:  [ ] chest pain [ ] palpitations [ ] edema	  Gastrointestinal:  [ ] nausea [ ] vomiting [ ] diarrhea [ ] constipation [ ] pain	  Genitourinary:  [ ] dysuria [ ] frequency [ ] hematuria [ ] discharge [ ] flank pain  [ ] incontinence  Musculoskeletal:  [ ] myalgias [ ] arthralgias [ ] arthritis  [ ] back pain  Neurological:  [ ] headache [ ] seizures  [ ] confusion/altered mental status  Psychiatric:  [ ] anxiety [ ] depression	  Hematology/Lymphatics:  [ ] lymphadenopathy  Endocrine:  [ ] adrenal [ ] thyroid  Allergic/Immunologic:	 [ ] transplant [ ] seasonal    Vital Signs Last 24 Hrs  T(F): 99.6 (09-08-24 @ 20:48), Max: 99.9 (09-08-24 @ 17:07)  Vital Signs Last 24 Hrs  HR: 96 (09-08-24 @ 20:48) (93 - 99)  BP: 152/79 (09-08-24 @ 20:48) (139/88 - 175/82)  RR: 18 (09-08-24 @ 20:48)  SpO2: 97% (09-08-24 @ 20:48) (97% - 100%)  Wt(kg): --    PHYSICAL EXAM:  Constitutional: non-toxic, no distress  HEAD/EYES: anicteric, no conjunctival injection  ENT:  supple, no thrush  Cardiovascular:   normal S1, S2, no murmur, no edema  Respiratory:  clear BS bilaterally, no wheezes, no rales  GI:  soft, non-tender, normal bowel sounds  :  no taylor, no CVA tenderness  Musculoskeletal:  no synovitis, normal ROM  Neurologic: awake and alert, normal strength, no focal findings  Skin:  no rash, no erythema, no phlebitis  Heme/Onc: no lymphadenopathy   Psychiatric:  awake, alert, appropriate mood                            8.3    7.07  )-----------( 196      ( 08 Sep 2024 07:24 )             26.7   09-08    145  |  113<H>  |  36<H>  ----------------------------<  83  4.9   |  15<L>  |  3.16<H>    Ca    8.5      08 Sep 2024 07:24    TPro  6.7  /  Alb  3.7  /  TBili  0.7  /  DBili  x   /  AST  20  /  ALT  16  /  AlkPhos  123<H>  09-08    Urinalysis Basic - ( 08 Sep 2024 07:24 )    Color: x / Appearance: x / SG: x / pH: x  Gluc: 83 mg/dL / Ketone: x  / Bili: x / Urobili: x   Blood: x / Protein: x / Nitrite: x   Leuk Esterase: x / RBC: x / WBC x   Sq Epi: x / Non Sq Epi: x / Bacteria: x    MICROBIOLOGY:              RADIOLOGY:  imaging below personally reviewed and agree with findings

## 2024-09-08 NOTE — CONSULT NOTE ADULT - SUBJECTIVE AND OBJECTIVE BOX
Patient is a 49y old  Male who presents with a chief complaint of SOB (08 Sep 2024 13:47)      HPI:  49-year-old male with past medical history of diabetes, hypertension, HLD. CVA with right sided hemiplegia, non verbal, dysphagia on PEG tube, recently treated pneumonia with meropenem (8/27-9/2) brought in by EMS from [facility] for difficulty breathing and hypoxia 85% RA, difficulty breathing per EMS and nursing home documentation.  Patient unable to provide additional history.   (08 Sep 2024 05:36)    found to be COVID +ve. elevated BNP but clear CXR. Presented with Cr of 3.27. no known baseline. records state CKD 3, but no records of prior Cr on file. Cr down to 3.16 today. bicarb @ 15. home Rx now restarted. has PEG tube as well.     PT seen in room. nonverbal. no edema. condom catheter not attached to pt.     PAST MEDICAL & SURGICAL HISTORY:  CVA (cerebrovascular accident)      HTN (hypertension)      PAF (paroxysmal atrial fibrillation)      Type 2 diabetes mellitus      Stage 3 chronic kidney disease      NSTEMI (non-ST elevation myocardial infarction)      History of pleural effusion      S/P percutaneous endoscopic gastrostomy (PEG) tube placement      AGUSTIN (iron deficiency anemia)      Anxiety and depression      Aphasia      HLD (hyperlipidemia)      S/P percutaneous endoscopic gastrostomy (PEG) tube placement          MEDICATIONS  (STANDING):  albuterol    90 MICROgram(s) HFA Inhaler 2 Puff(s) Inhalation every 8 hours  amLODIPine   Tablet 10 milliGRAM(s) Oral daily  aspirin  chewable 81 milliGRAM(s) Oral daily  atorvastatin 40 milliGRAM(s) Oral at bedtime  dexAMETHasone  Injectable 6 milliGRAM(s) IV Push daily  dextrose 5% + lactated ringers. 1000 milliLiter(s) (60 mL/Hr) IV Continuous <Continuous>  dextrose 5%. 1000 milliLiter(s) (50 mL/Hr) IV Continuous <Continuous>  dextrose 5%. 1000 milliLiter(s) (100 mL/Hr) IV Continuous <Continuous>  dextrose 50% Injectable 25 Gram(s) IV Push once  dextrose 50% Injectable 25 Gram(s) IV Push once  dextrose 50% Injectable 12.5 Gram(s) IV Push once  ferrous    sulfate Liquid 300 milliGRAM(s) Enteral Tube daily  glucagon  Injectable 1 milliGRAM(s) IntraMuscular once  heparin   Injectable 5000 Unit(s) SubCutaneous every 12 hours  hydrALAZINE 25 milliGRAM(s) Oral two times a day  isosorbide   dinitrate Tablet (ISORDIL) 20 milliGRAM(s) Oral three times a day  labetalol 300 milliGRAM(s) Oral two times a day  QUEtiapine 25 milliGRAM(s) Oral two times a day  remdesivir  IVPB   IV Intermittent   senna 2 Tablet(s) Oral at bedtime  sodium bicarbonate 1300 milliGRAM(s) Oral three times a day      Allergies    No Known Allergies    Intolerances        SOCIAL HISTORY:  Denies ETOh,Smoking,     FAMILY HISTORY:      REVIEW OF SYSTEMS:  UTO     VITAL:  T(C): , Max: 37.1 (09-08-24 @ 06:43)  T(F): , Max: 98.8 (09-08-24 @ 09:05)  HR: 99 (09-08-24 @ 13:51)  BP: 159/83 (09-08-24 @ 13:51)  BP(mean): --  RR: 18 (09-08-24 @ 13:51)  SpO2: 100% (09-08-24 @ 13:51)  Wt(kg): --    PHYSICAL EXAM:  Constitutional: NAD, not alert  HEENT: NCAT, MMM  Neck: Supple, No JVD  Respiratory: CTA-b/l  Cardiovascular: RRR s1s2, no m/r/g  Gastrointestinal: BS+, soft, NT/ND. PEG in place  Extremities: No peripheral edema b/l  Neurological: altered   Back: no CVAT b/l  Skin: No rashes, no nevi    LABS:                        8.3    7.07  )-----------( 196      ( 08 Sep 2024 07:24 )             26.7     Na(145)/K(4.9)/Cl(113)/HCO3(15)/BUN(36)/Cr(3.16)Glu(83)/Ca(8.5)/Mg(--)/PO4(--)    09-08 @ 07:24  Na(141)/K(5.0)/Cl(111)/HCO3(17)/BUN(35)/Cr(3.27)Glu(105)/Ca(8.6)/Mg(--)/PO4(--)    09-07 @ 19:41    Urinalysis Basic - ( 08 Sep 2024 07:24 )    Color: x / Appearance: x / SG: x / pH: x  Gluc: 83 mg/dL / Ketone: x  / Bili: x / Urobili: x   Blood: x / Protein: x / Nitrite: x   Leuk Esterase: x / RBC: x / WBC x   Sq Epi: x / Non Sq Epi: x / Bacteria: x            IMAGING:    ASSESSMENT:  49-year-old male with past medical history of diabetes, hypertension, HLD. CVA with right sided hemiplegia, non verbal, dysphagia on PEG tube, recently treated pneumonia with meropenem (8/27-9/2) brought in by EMS from [facility] for difficulty breathing and hypoxia 85% RA, difficulty breathing per EMS and nursing home documentation. found to have COVID and QUITA vs advanced CKD. Nephrology consulted for QUITA vs CKD    Suspect QUITA on CKD vs advanced CKD  -?Baseline. no records on file  -QUITA 2/2 prerenal azotemia? ATN? unclear etio at this point in time. will need further testing    COVID virus  -on RDV    dysphagia  -s/p PEG    CVA  -R sided hemiplegia    RECOMMEND:  -Obtain Erin, Upcr post void residual, renal US, uric acid, PTH  -STOP D5NS, replace with D5LR @ 60 continuous for now  -bicarb 1300 TID through peg  -appreciate Dr. Seals assistance with restarting home Rx and PEG feeds  -monitor resp status closely, if inc O2 req can stop D5LR  -f/u labs and imaging as seen above  -Dose Rx for CrCl 20-25mL/min     Thank you for involving [facility] in this patient's care.    With warm regards,    Dennys Min DO   [facility]  Office: [phone]

## 2024-09-08 NOTE — H&P ADULT - ADDITIONAL PE
Patient asked about med refill for gabapentin. Please review by end of day. Patient is out of medication. T(F): 98.4 (08 Sep 2024 01:49), Max: 98.5 (07 Sep 2024 19:15)  HR: 99 (08 Sep 2024 01:49) (67 - 99)  BP: 175/82 (08 Sep 2024 01:49) (152/80 - 175/82)  RR: 20 (08 Sep 2024 01:49) (20 - 25)  SpO2: 100% (08 Sep 2024 01:49) (95% - 100%): nasal cannula O2 Flow (L/min): 2

## 2024-09-08 NOTE — H&P ADULT - PROBLEM SELECTOR PLAN 5
documented DM nephropathy, suspect CKD but unclear baseline creatinine   - will monitor renal function and urine output

## 2024-09-09 DIAGNOSIS — R73.9 HYPERGLYCEMIA, UNSPECIFIED: ICD-10-CM

## 2024-09-09 LAB
A1C WITH ESTIMATED AVERAGE GLUCOSE RESULT: 5.2 % — SIGNIFICANT CHANGE UP (ref 4–5.6)
ALBUMIN SERPL ELPH-MCNC: 3.5 G/DL — SIGNIFICANT CHANGE UP (ref 3.3–5)
ALP SERPL-CCNC: 120 U/L — SIGNIFICANT CHANGE UP (ref 40–120)
ALT FLD-CCNC: 14 U/L — SIGNIFICANT CHANGE UP (ref 10–45)
ANION GAP SERPL CALC-SCNC: 12 MMOL/L — SIGNIFICANT CHANGE UP (ref 5–17)
AST SERPL-CCNC: 17 U/L — SIGNIFICANT CHANGE UP (ref 10–40)
BASOPHILS # BLD AUTO: 0.1 K/UL — SIGNIFICANT CHANGE UP (ref 0–0.2)
BASOPHILS NFR BLD AUTO: 1.2 % — SIGNIFICANT CHANGE UP (ref 0–2)
BILIRUB DIRECT SERPL-MCNC: 0.1 MG/DL — SIGNIFICANT CHANGE UP (ref 0–0.3)
BILIRUB SERPL-MCNC: 0.5 MG/DL — SIGNIFICANT CHANGE UP (ref 0.2–1.2)
BUN SERPL-MCNC: 41 MG/DL — HIGH (ref 7–23)
CALCIUM SERPL-MCNC: 8.4 MG/DL — SIGNIFICANT CHANGE UP (ref 8.4–10.5)
CHLORIDE SERPL-SCNC: 116 MMOL/L — HIGH (ref 96–108)
CO2 SERPL-SCNC: 21 MMOL/L — LOW (ref 22–31)
CREAT SERPL-MCNC: 2.81 MG/DL — HIGH (ref 0.5–1.3)
CULTURE RESULTS: NO GROWTH — SIGNIFICANT CHANGE UP
D DIMER BLD IA.RAPID-MCNC: 290 NG/ML DDU — HIGH
EGFR: 27 ML/MIN/1.73M2 — LOW
EOSINOPHIL # BLD AUTO: 0.1 K/UL — SIGNIFICANT CHANGE UP (ref 0–0.5)
EOSINOPHIL NFR BLD AUTO: 1.2 % — SIGNIFICANT CHANGE UP (ref 0–6)
ESTIMATED AVERAGE GLUCOSE: 103 MG/DL — SIGNIFICANT CHANGE UP (ref 68–114)
GAS PNL BLDV: SIGNIFICANT CHANGE UP
GLUCOSE BLDC GLUCOMTR-MCNC: 169 MG/DL — HIGH (ref 70–99)
GLUCOSE BLDC GLUCOMTR-MCNC: 171 MG/DL — HIGH (ref 70–99)
GLUCOSE BLDC GLUCOMTR-MCNC: 184 MG/DL — HIGH (ref 70–99)
GLUCOSE BLDC GLUCOMTR-MCNC: 200 MG/DL — HIGH (ref 70–99)
GLUCOSE BLDC GLUCOMTR-MCNC: 228 MG/DL — HIGH (ref 70–99)
GLUCOSE SERPL-MCNC: 177 MG/DL — HIGH (ref 70–99)
HCT VFR BLD CALC: 26.8 % — LOW (ref 39–50)
HGB BLD-MCNC: 8.5 G/DL — LOW (ref 13–17)
IMM GRANULOCYTES NFR BLD AUTO: 0.6 % — SIGNIFICANT CHANGE UP (ref 0–0.9)
INR BLD: 1.11 RATIO — SIGNIFICANT CHANGE UP (ref 0.85–1.18)
LYMPHOCYTES # BLD AUTO: 1.88 K/UL — SIGNIFICANT CHANGE UP (ref 1–3.3)
LYMPHOCYTES # BLD AUTO: 22.7 % — SIGNIFICANT CHANGE UP (ref 13–44)
MAGNESIUM SERPL-MCNC: 2 MG/DL — SIGNIFICANT CHANGE UP (ref 1.6–2.6)
MCHC RBC-ENTMCNC: 28.1 PG — SIGNIFICANT CHANGE UP (ref 27–34)
MCHC RBC-ENTMCNC: 31.7 GM/DL — LOW (ref 32–36)
MCV RBC AUTO: 88.4 FL — SIGNIFICANT CHANGE UP (ref 80–100)
MONOCYTES # BLD AUTO: 0.81 K/UL — SIGNIFICANT CHANGE UP (ref 0–0.9)
MONOCYTES NFR BLD AUTO: 9.8 % — SIGNIFICANT CHANGE UP (ref 2–14)
NEUTROPHILS # BLD AUTO: 5.33 K/UL — SIGNIFICANT CHANGE UP (ref 1.8–7.4)
NEUTROPHILS NFR BLD AUTO: 64.5 % — SIGNIFICANT CHANGE UP (ref 43–77)
NRBC # BLD: 0 /100 WBCS — SIGNIFICANT CHANGE UP (ref 0–0)
PHOSPHATE SERPL-MCNC: 4.9 MG/DL — HIGH (ref 2.5–4.5)
PLATELET # BLD AUTO: 245 K/UL — SIGNIFICANT CHANGE UP (ref 150–400)
POTASSIUM SERPL-MCNC: 4.7 MMOL/L — SIGNIFICANT CHANGE UP (ref 3.5–5.3)
POTASSIUM SERPL-SCNC: 4.7 MMOL/L — SIGNIFICANT CHANGE UP (ref 3.5–5.3)
PROT SERPL-MCNC: 6.5 G/DL — SIGNIFICANT CHANGE UP (ref 6–8.3)
PROTHROM AB SERPL-ACNC: 11.6 SEC — SIGNIFICANT CHANGE UP (ref 9.5–13)
RBC # BLD: 3.03 M/UL — LOW (ref 4.2–5.8)
RBC # FLD: 15.5 % — HIGH (ref 10.3–14.5)
SODIUM SERPL-SCNC: 149 MMOL/L — HIGH (ref 135–145)
SPECIMEN SOURCE: SIGNIFICANT CHANGE UP
WBC # BLD: 8.27 K/UL — SIGNIFICANT CHANGE UP (ref 3.8–10.5)
WBC # FLD AUTO: 8.27 K/UL — SIGNIFICANT CHANGE UP (ref 3.8–10.5)

## 2024-09-09 PROCEDURE — 99253 IP/OBS CNSLTJ NEW/EST LOW 45: CPT

## 2024-09-09 PROCEDURE — 71250 CT THORAX DX C-: CPT | Mod: 26

## 2024-09-09 PROCEDURE — 74176 CT ABD & PELVIS W/O CONTRAST: CPT | Mod: 26

## 2024-09-09 PROCEDURE — 99223 1ST HOSP IP/OBS HIGH 75: CPT

## 2024-09-09 PROCEDURE — 71045 X-RAY EXAM CHEST 1 VIEW: CPT | Mod: 26

## 2024-09-09 PROCEDURE — 74018 RADEX ABDOMEN 1 VIEW: CPT | Mod: 26

## 2024-09-09 RX ORDER — IPRATROPIUM BROMIDE AND ALBUTEROL SULFATE .5; 3 MG/3ML; MG/3ML
3 SOLUTION RESPIRATORY (INHALATION) ONCE
Refills: 0 | Status: DISCONTINUED | OUTPATIENT
Start: 2024-09-09 | End: 2024-09-09

## 2024-09-09 RX ORDER — HALOPERIDOL 1 MG
2 TABLET ORAL ONCE
Refills: 0 | Status: DISCONTINUED | OUTPATIENT
Start: 2024-09-09 | End: 2024-09-18

## 2024-09-09 RX ORDER — PIPERACILLIN SODIUM AND TAZOBACTAM SODIUM 3; .375 G/15ML; G/15ML
3.38 INJECTION, POWDER, FOR SOLUTION INTRAVENOUS ONCE
Refills: 0 | Status: COMPLETED | OUTPATIENT
Start: 2024-09-09 | End: 2024-09-09

## 2024-09-09 RX ORDER — PIPERACILLIN SODIUM AND TAZOBACTAM SODIUM 3; .375 G/15ML; G/15ML
3.38 INJECTION, POWDER, FOR SOLUTION INTRAVENOUS EVERY 8 HOURS
Refills: 0 | Status: DISCONTINUED | OUTPATIENT
Start: 2024-09-09 | End: 2024-09-10

## 2024-09-09 RX ORDER — SODIUM BICARBONATE 84 MG/ML
650 INJECTION, SOLUTION INTRAVENOUS THREE TIMES A DAY
Refills: 0 | Status: DISCONTINUED | OUTPATIENT
Start: 2024-09-09 | End: 2024-09-18

## 2024-09-09 RX ORDER — ACETAMINOPHEN 325 MG/1
650 TABLET ORAL EVERY 6 HOURS
Refills: 0 | Status: DISCONTINUED | OUTPATIENT
Start: 2024-09-09 | End: 2024-09-09

## 2024-09-09 RX ORDER — LORAZEPAM 4 MG/ML
1 INJECTION INTRAMUSCULAR; INTRAVENOUS ONCE
Refills: 0 | Status: DISCONTINUED | OUTPATIENT
Start: 2024-09-09 | End: 2024-09-09

## 2024-09-09 RX ORDER — PANCRELIPASE 16000; 60500; 57500 [USP'U]/1; [USP'U]/1; [USP'U]/1
1 CAPSULE, DELAYED RELEASE ORAL ONCE
Refills: 0 | Status: DISCONTINUED | OUTPATIENT
Start: 2024-09-09 | End: 2024-09-18

## 2024-09-09 RX ORDER — IPRATROPIUM BROMIDE AND ALBUTEROL SULFATE .5; 3 MG/3ML; MG/3ML
3 SOLUTION RESPIRATORY (INHALATION) EVERY 6 HOURS
Refills: 0 | Status: DISCONTINUED | OUTPATIENT
Start: 2024-09-09 | End: 2024-09-18

## 2024-09-09 RX ORDER — PANTOPRAZOLE SODIUM 40 MG
40 TABLET, DELAYED RELEASE (ENTERIC COATED) ORAL DAILY
Refills: 0 | Status: DISCONTINUED | OUTPATIENT
Start: 2024-09-09 | End: 2024-09-10

## 2024-09-09 RX ADMIN — Medication 40 MILLIGRAM(S): at 13:07

## 2024-09-09 RX ADMIN — Medication 81 MILLIGRAM(S): at 13:07

## 2024-09-09 RX ADMIN — Medication 5000 UNIT(S): at 05:24

## 2024-09-09 RX ADMIN — Medication 20 MILLIGRAM(S): at 18:07

## 2024-09-09 RX ADMIN — SODIUM BICARBONATE 650 MILLIGRAM(S): 84 INJECTION, SOLUTION INTRAVENOUS at 13:07

## 2024-09-09 RX ADMIN — PIPERACILLIN SODIUM AND TAZOBACTAM SODIUM 200 GRAM(S): 3; .375 INJECTION, POWDER, FOR SOLUTION INTRAVENOUS at 09:17

## 2024-09-09 RX ADMIN — Medication 300 MILLIGRAM(S): at 18:07

## 2024-09-09 RX ADMIN — Medication 60 MILLILITER(S): at 16:28

## 2024-09-09 RX ADMIN — Medication 2 PUFF(S): at 05:25

## 2024-09-09 RX ADMIN — Medication 6 MILLIGRAM(S): at 09:30

## 2024-09-09 RX ADMIN — PIPERACILLIN SODIUM AND TAZOBACTAM SODIUM 25 GRAM(S): 3; .375 INJECTION, POWDER, FOR SOLUTION INTRAVENOUS at 14:05

## 2024-09-09 RX ADMIN — Medication 300 MILLIGRAM(S): at 13:07

## 2024-09-09 RX ADMIN — Medication 2 PUFF(S): at 13:08

## 2024-09-09 RX ADMIN — PIPERACILLIN SODIUM AND TAZOBACTAM SODIUM 25 GRAM(S): 3; .375 INJECTION, POWDER, FOR SOLUTION INTRAVENOUS at 18:18

## 2024-09-09 RX ADMIN — IPRATROPIUM BROMIDE AND ALBUTEROL SULFATE 3 MILLILITER(S): .5; 3 SOLUTION RESPIRATORY (INHALATION) at 18:08

## 2024-09-09 RX ADMIN — Medication 5000 UNIT(S): at 18:08

## 2024-09-09 RX ADMIN — Medication 20 MILLIGRAM(S): at 13:06

## 2024-09-09 RX ADMIN — Medication 25 MILLIGRAM(S): at 18:08

## 2024-09-09 RX ADMIN — Medication 1: at 18:18

## 2024-09-09 RX ADMIN — REMDESIVIR 200 MILLIGRAM(S): 5 INJECTION INTRAVENOUS at 13:06

## 2024-09-09 RX ADMIN — IPRATROPIUM BROMIDE AND ALBUTEROL SULFATE 3 MILLILITER(S): .5; 3 SOLUTION RESPIRATORY (INHALATION) at 13:07

## 2024-09-09 NOTE — RAPID RESPONSE TEAM SUMMARY - NSSITUATIONBACKGROUNDRRT_GEN_ALL_CORE
49-year-old male with past medical history of diabetes, hypertension, HLD. CVA with right sided hemiplegia, non verbal, dysphagia on PEG tube, recently treated pneumonia with meropenem (8/27-9/2) brought in by EMS from [facility] for difficulty breathing and hypoxia 85% RA, difficulty breathing per EMS and nursing home documentation.  Patient unable to provide additional history. Currently admitted and undergoing treatment for COVID-19 pneumonia.     RRT called for hypoxia and distress. Nurse says that earlier in the night, she gave the tube feed bolus and later when she uncapped the tube to give medication, all of the feed that was injected for last bolus came out rapidly all over bed. Around 430 the patient began to groan and cry out loudly. Patient is nonverbal, confirmed DNR/DNI status based on Mol[facility] in chart and [facility] note.

## 2024-09-09 NOTE — DIETITIAN INITIAL EVALUATION ADULT - NUTRITION DIAGNOSITC TERMINOLOGY #1
c/o chest tightness started today, denies radiation, denies fever, URI symptoms, denies sick contact HX: valve prolapse
Inadequate Protein Energy Intake

## 2024-09-09 NOTE — DIETITIAN INITIAL EVALUATION ADULT - OTHER INFO
- Dosing wt: 150 pounds (9/07)  - No new wts to assess at this time. Wt hx per NH paper chart in pounds: 155 (9/05), 149.4 (8/10), 139.8 (7/11), 140.2 (6/23).   - RD to continue to monitor weight trends as able.   - Nutritionally Pertinent Meds in-house: Abx, IVF, pancrelipase, atorvastatin, dexamethasone.   - Nutritionally Pertinent Labs: Hypernatremia, Hyperphosphatemia noted.   - COVID+ on Remdesivir.   - Endo: Hx T2DM; no regimen PTA noted per outpatient medications list. A1c 5.2% (9/09) - indicates good glycemic control.    - Neph: suspected QUITA on CKD vs advanced CKD.   - S/p RRT 9/09 for Hypoxia, distress; PEG tube clogged.

## 2024-09-09 NOTE — CONSULT NOTE ADULT - ASSESSMENT
49-year-old male h/o  HTN/ HLD. DM ,   CVA with right sided hemiplegia, non verbal, dysphagia ,has  PEG tube, recently treated pneumonia brought in by EMS from [facility] for difficulty breathing and hypoxia   per   nursing home , pt has   covid   QUITA    Assessment  Hyperglycemia: 49y Male with hyperglycemias, being started on tube feeds via PEG tube, A1c stable, not on DM meds, started on sliding scale now.   COVID+: on medications, on isolation , monitored.  HTN: on antihypertensive medications, monitored, asymptomatic.    Discussed plan and management wit Dr Mau León MD  Cell: [phone]  Office: [phone]               49-year-old male h/o  HTN/ HLD. DM ,   CVA with right sided hemiplegia, non verbal, dysphagia ,has  PEG tube, recently treated pneumonia brought in by EMS from [facility] for difficulty breathing and hypoxia   per   nursing home , pt has   covid   QUITA      Assessment  Hyperglycemia: 49y Male with hyperglycemias, being started on tube feeds via PEG tube, A1c stable, not on DM meds, started on sliding scale now.   COVID+: on medications, on isolation , monitored.  HTN: on antihypertensive medications, monitored, asymptomatic.    Discussed plan and management wit Dr Mau León MD  Cell: [phone]  Office: [phone]

## 2024-09-09 NOTE — CONSULT NOTE ADULT - SUBJECTIVE AND OBJECTIVE BOX
HPI:  49-year-old male with past medical history of diabetes, hypertension, HLD. CVA with right sided hemiplegia, non verbal, dysphagia on PEG tube, recently treated pneumonia with meropenem (8/27-9/2) brought in by EMS from [facility] for difficulty breathing and hypoxia 85% RA, difficulty breathing per EMS and nursing home documentation.  Patient unable to provide additional history.   (08 Sep 2024 05:36)      Endo was consulted for glycemic control.      PAST MEDICAL & SURGICAL HISTORY:  CVA (cerebrovascular accident)      HTN (hypertension)      PAF (paroxysmal atrial fibrillation)      Type 2 diabetes mellitus      Stage 3 chronic kidney disease      NSTEMI (non-ST elevation myocardial infarction)      History of pleural effusion      S/P percutaneous endoscopic gastrostomy (PEG) tube placement      AGUSTIN (iron deficiency anemia)      Anxiety and depression      Aphasia      HLD (hyperlipidemia)      S/P percutaneous endoscopic gastrostomy (PEG) tube placement          FAMILY HISTORY:    Social History:  lives at [facility] (08 Sep 2024 05:36)        HOME MEDICATIONS:  Home Medications:  amLODIPine 10 mg oral tablet: 1 tab(s) by gastrostomy tube once a day (08 Sep 2024 06:17)  aspirin 81 mg oral tablet, chewable: 1 tab(s) by gastrostomy tube once a day (08 Sep 2024 06:18)  atorvastatin 40 mg oral tablet: 1 tab(s) by gastrostomy tube once a day (at bedtime) (08 Sep 2024 06:18)  ferrous sulfate 325 mg (65 mg elemental iron) oral delayed release tablet: 1 tab(s) by gastrostomy tube once a day (08 Sep 2024 06:18)  hydrALAZINE 25 mg oral tablet: 1 tab(s) by gastrostomy tube 2 times a day (08 Sep 2024 06:23)  isosorbide dinitrate 20 mg oral tablet: 1 tab(s) by gastrostomy tube 3 times a day (08 Sep 2024 06:23)  labetalol 300 mg oral tablet: 1 tab(s) by gastrostomy tube 2 times a day (08 Sep 2024 06:23)  Lasix 40 mg oral tablet: 1 tab(s) by gastrostomy tube once a day (08 Sep 2024 06:23)  PeriGuard topical ointment: Apply topically to affected area 2 times a day (08 Sep 2024 06:23)  senna (sennosides) 8.6 mg oral tablet: 2 tab(s) by gastrostomy tube once a day (at bedtime) (08 Sep 2024 06:23)  Seroquel 25 mg oral tablet: 1 tab(s) by gastrostomy tube 2 times a day (08 Sep 2024 06:23)  sodium bicarbonate: 1,300 milligram(s) by gastrostomy tube 3 times a day (08 Sep 2024 06:23)  Vitamin C 250 mg oral tablet, chewable: 2 tab(s) by gastrostomy tube once a day (08 Sep 2024 06:23)      MEDICATIONS  (STANDING):  albuterol    90 MICROgram(s) HFA Inhaler 2 Puff(s) Inhalation every 8 hours  albuterol/ipratropium for Nebulization 3 milliLiter(s) Nebulizer every 6 hours  amLODIPine   Tablet 10 milliGRAM(s) Oral daily  aspirin  chewable 81 milliGRAM(s) Oral daily  atorvastatin 40 milliGRAM(s) Oral at bedtime  dexAMETHasone  Injectable 6 milliGRAM(s) IV Push daily  dextrose 5%. 1000 milliLiter(s) (50 mL/Hr) IV Continuous <Continuous>  dextrose 5%. 1000 milliLiter(s) (100 mL/Hr) IV Continuous <Continuous>  dextrose 5%. 1000 milliLiter(s) (60 mL/Hr) IV Continuous <Continuous>  dextrose 50% Injectable 12.5 Gram(s) IV Push once  dextrose 50% Injectable 25 Gram(s) IV Push once  dextrose 50% Injectable 25 Gram(s) IV Push once  ferrous    sulfate Liquid 300 milliGRAM(s) Enteral Tube daily  glucagon  Injectable 1 milliGRAM(s) IntraMuscular once  heparin   Injectable 5000 Unit(s) SubCutaneous every 12 hours  hydrALAZINE 25 milliGRAM(s) Oral two times a day  insulin lispro (ADMELOG) corrective regimen sliding scale   SubCutaneous every 6 hours  isosorbide   dinitrate Tablet (ISORDIL) 20 milliGRAM(s) Oral three times a day  labetalol 300 milliGRAM(s) Oral two times a day  pancrelipase (VIOKACE) for Occluded enteral feeding tubes 1 Tablet(s) Enteral Tube once  pantoprazole  Injectable 40 milliGRAM(s) IV Push daily  piperacillin/tazobactam IVPB.- 3.375 Gram(s) IV Intermittent once  piperacillin/tazobactam IVPB.- 3.375 Gram(s) IV Intermittent once  piperacillin/tazobactam IVPB.. 3.375 Gram(s) IV Intermittent every 8 hours  QUEtiapine 25 milliGRAM(s) Oral two times a day  remdesivir  IVPB 100 milliGRAM(s) IV Intermittent every 24 hours  remdesivir  IVPB   IV Intermittent   senna 2 Tablet(s) Oral at bedtime  sodium bicarbonate 650 milliGRAM(s) Oral three times a day    MEDICATIONS  (PRN):  acetaminophen   Oral Liquid .. 650 milliGRAM(s) Oral every 6 hours PRN Temp greater or equal to 38C (100.4F), Moderate Pain (4 - 6)  dextrose Oral Gel 15 Gram(s) Oral once PRN Blood Glucose LESS THAN 70 milliGRAM(s)/deciliter  haloperidol    Injectable 2 milliGRAM(s) IntraMuscular once PRN Agitation      Allergies    No Known Allergies    Intolerances        Review of Systems:  Neuro: No HA, no dizziness  Cardiovascular: No chest pain, no palpitations  Respiratory: no SOB, no cough  GI: No nausea, vomiting, abdominal pain  MSK: Denies joint/muscle pain      ALL OTHER SYSTEMS REVIEWED AND NEGATIVE    ***UNABLE TO OBTAIN    PHYSICAL EXAM:  VITALS: T(C): 36.9 (09-09-24 @ 13:05)  T(F): 98.5 (09-09-24 @ 13:05), Max: 99.9 (09-08-24 @ 17:07)  HR: 94 (09-09-24 @ 13:05) (91 - 99)  BP: 167/87 (09-09-24 @ 13:05) (152/79 - 167/87)  RR:  (18 - 18)  SpO2:  (93% - 100%)  Wt(kg): --  GENERAL: NAD, well-groomed, well-developed  NEURO:  alert and oriented  RESPIRATORY: Clear to auscultation bilaterally; No rales, rhonchi, wheezing  CARDIOVASCULAR: Si S2  GI: Soft, non distended, normal bowel sounds  MUSCULOSKELETAL: Moves all extremities equally       POCT Blood Glucose.: 184 mg/dL (09-09-24 @ 11:42)  POCT Blood Glucose.: 171 mg/dL (09-09-24 @ 09:18)  POCT Blood Glucose.: 169 mg/dL (09-09-24 @ 06:06)  POCT Blood Glucose.: 107 mg/dL (09-08-24 @ 17:24)                            8.5    8.27  )-----------( 245      ( 09 Sep 2024 06:30 )             26.8       09-09    149<H>  |  116<H>  |  41<H>  ----------------------------<  177<H>  4.7   |  21<L>  |  2.81<H>    eGFR: 27<L>    Ca    8.4      09-09  Mg     2.0     09-09  Phos  4.9     09-09    TPro  6.5  /  Alb  3.5  /  TBili  0.5  /  DBili  0.1  /  AST  17  /  ALT  14  /  AlkPhos  120  09-09      Thyroid Function Tests:    Diet, NPO with Tube Feed:   Tube Feeding Modality: Gastrostomy  Glucerna 1.2 Jairo (GLUCERNARTH)  Total Volume for 24 Hours (mL): 1422  Bolus  Total Volume of Bolus (mL):  237  Total # of Feeds: 6  Tube Feed Frequency: Every 4 hours   Tube Feed Start Time: 00:00  Bolus Feed Rate (mL per Hour): 237   Bolus Feed Duration (in Hours): 1  Bolus Feed Instructions:  Provide 6 cans (1 can=237 ml) Glucerna 1.2 daily.  Free Water Flush Instructions:  defer to team (09-09-24 @ 12:47) [Pending Verification By Attending]  Diet, NPO:   Except Medications (09-09-24 @ 06:16) [Active]          A1C with Estimated Average Glucose Result: 5.2 % (09-09-24 @ 08:15)                   HPI:  49-year-old male with past medical history of diabetes, hypertension, HLD. CVA with right sided hemiplegia, non verbal, dysphagia on PEG tube, recently treated pneumonia with meropenem (8/27-9/2) brought in by EMS from [facility] for difficulty breathing and hypoxia 85% RA, difficulty breathing per EMS and nursing home documentation.  Patient unable to provide additional history.   (08 Sep 2024 05:36)      Endo was consulted for glycemic control.      PAST MEDICAL & SURGICAL HISTORY:  CVA (cerebrovascular accident)      HTN (hypertension)      PAF (paroxysmal atrial fibrillation)      Type 2 diabetes mellitus      Stage 3 chronic kidney disease      NSTEMI (non-ST elevation myocardial infarction)      History of pleural effusion      S/P percutaneous endoscopic gastrostomy (PEG) tube placement      AGUSTIN (iron deficiency anemia)      Anxiety and depression      Aphasia      HLD (hyperlipidemia)      S/P percutaneous endoscopic gastrostomy (PEG) tube placement          FAMILY HISTORY:    Social History:  lives at [facility] (08 Sep 2024 05:36)        HOME MEDICATIONS:  Home Medications:  amLODIPine 10 mg oral tablet: 1 tab(s) by gastrostomy tube once a day (08 Sep 2024 06:17)  aspirin 81 mg oral tablet, chewable: 1 tab(s) by gastrostomy tube once a day (08 Sep 2024 06:18)  atorvastatin 40 mg oral tablet: 1 tab(s) by gastrostomy tube once a day (at bedtime) (08 Sep 2024 06:18)  ferrous sulfate 325 mg (65 mg elemental iron) oral delayed release tablet: 1 tab(s) by gastrostomy tube once a day (08 Sep 2024 06:18)  hydrALAZINE 25 mg oral tablet: 1 tab(s) by gastrostomy tube 2 times a day (08 Sep 2024 06:23)  isosorbide dinitrate 20 mg oral tablet: 1 tab(s) by gastrostomy tube 3 times a day (08 Sep 2024 06:23)  labetalol 300 mg oral tablet: 1 tab(s) by gastrostomy tube 2 times a day (08 Sep 2024 06:23)  Lasix 40 mg oral tablet: 1 tab(s) by gastrostomy tube once a day (08 Sep 2024 06:23)  PeriGuard topical ointment: Apply topically to affected area 2 times a day (08 Sep 2024 06:23)  senna (sennosides) 8.6 mg oral tablet: 2 tab(s) by gastrostomy tube once a day (at bedtime) (08 Sep 2024 06:23)  Seroquel 25 mg oral tablet: 1 tab(s) by gastrostomy tube 2 times a day (08 Sep 2024 06:23)  sodium bicarbonate: 1,300 milligram(s) by gastrostomy tube 3 times a day (08 Sep 2024 06:23)  Vitamin C 250 mg oral tablet, chewable: 2 tab(s) by gastrostomy tube once a day (08 Sep 2024 06:23)      MEDICATIONS  (STANDING):  albuterol    90 MICROgram(s) HFA Inhaler 2 Puff(s) Inhalation every 8 hours  albuterol/ipratropium for Nebulization 3 milliLiter(s) Nebulizer every 6 hours  amLODIPine   Tablet 10 milliGRAM(s) Oral daily  aspirin  chewable 81 milliGRAM(s) Oral daily  atorvastatin 40 milliGRAM(s) Oral at bedtime  dexAMETHasone  Injectable 6 milliGRAM(s) IV Push daily  dextrose 5%. 1000 milliLiter(s) (50 mL/Hr) IV Continuous <Continuous>  dextrose 5%. 1000 milliLiter(s) (100 mL/Hr) IV Continuous <Continuous>  dextrose 5%. 1000 milliLiter(s) (60 mL/Hr) IV Continuous <Continuous>  dextrose 50% Injectable 12.5 Gram(s) IV Push once  dextrose 50% Injectable 25 Gram(s) IV Push once  dextrose 50% Injectable 25 Gram(s) IV Push once  ferrous    sulfate Liquid 300 milliGRAM(s) Enteral Tube daily  glucagon  Injectable 1 milliGRAM(s) IntraMuscular once  heparin   Injectable 5000 Unit(s) SubCutaneous every 12 hours  hydrALAZINE 25 milliGRAM(s) Oral two times a day  insulin lispro (ADMELOG) corrective regimen sliding scale   SubCutaneous every 6 hours  isosorbide   dinitrate Tablet (ISORDIL) 20 milliGRAM(s) Oral three times a day  labetalol 300 milliGRAM(s) Oral two times a day  pancrelipase (VIOKACE) for Occluded enteral feeding tubes 1 Tablet(s) Enteral Tube once  pantoprazole  Injectable 40 milliGRAM(s) IV Push daily  piperacillin/tazobactam IVPB.- 3.375 Gram(s) IV Intermittent once  piperacillin/tazobactam IVPB.- 3.375 Gram(s) IV Intermittent once  piperacillin/tazobactam IVPB.. 3.375 Gram(s) IV Intermittent every 8 hours  QUEtiapine 25 milliGRAM(s) Oral two times a day  remdesivir  IVPB 100 milliGRAM(s) IV Intermittent every 24 hours  remdesivir  IVPB   IV Intermittent   senna 2 Tablet(s) Oral at bedtime  sodium bicarbonate 650 milliGRAM(s) Oral three times a day    MEDICATIONS  (PRN):  acetaminophen   Oral Liquid .. 650 milliGRAM(s) Oral every 6 hours PRN Temp greater or equal to 38C (100.4F), Moderate Pain (4 - 6)  dextrose Oral Gel 15 Gram(s) Oral once PRN Blood Glucose LESS THAN 70 milliGRAM(s)/deciliter  haloperidol    Injectable 2 milliGRAM(s) IntraMuscular once PRN Agitation      Allergies    No Known Allergies    Intolerances        Review of Systems:  Neuro: No HA, no dizziness  Cardiovascular: No chest pain, no palpitations  Respiratory: no SOB, no cough  GI: No nausea, vomiting, abdominal pain  MSK: Denies joint/muscle pain      ALL OTHER SYSTEMS REVIEWED AND NEGATIVE    ***UNABLE TO OBTAIN    PHYSICAL EXAM:  VITALS: T(C): 36.9 (09-09-24 @ 13:05)  T(F): 98.5 (09-09-24 @ 13:05), Max: 99.9 (09-08-24 @ 17:07)  HR: 94 (09-09-24 @ 13:05) (91 - 99)  BP: 167/87 (09-09-24 @ 13:05) (152/79 - 167/87)  RR:  (18 - 18)  SpO2:  (93% - 100%)  Wt(kg): --  GENERAL: NAD, well-groomed, well-developed  NEURO:  alert and oriented  RESPIRATORY: Clear to auscultation bilaterally; No rales, rhonchi, wheezing  CARDIOVASCULAR: Si S2  GI: Soft, non distended, normal bowel sounds  MUSCULOSKELETAL: Moves all extremities equally       POCT Blood Glucose.: 184 mg/dL (09-09-24 @ 11:42)  POCT Blood Glucose.: 171 mg/dL (09-09-24 @ 09:18)  POCT Blood Glucose.: 169 mg/dL (09-09-24 @ 06:06)  POCT Blood Glucose.: 107 mg/dL (09-08-24 @ 17:24)                            8.5    8.27  )-----------( 245      ( 09 Sep 2024 06:30 )             26.8       09-09    149<H>  |  116<H>  |  41<H>  ----------------------------<  177<H>  4.7   |  21<L>  |  2.81<H>    eGFR: 27<L>    Ca    8.4      09-09  Mg     2.0     09-09  Phos  4.9     09-09    TPro  6.5  /  Alb  3.5  /  TBili  0.5  /  DBili  0.1  /  AST  17  /  ALT  14  /  AlkPhos  120  09-09      Thyroid Function Tests:    Diet, NPO with Tube Feed:   Tube Feeding Modality: Gastrostomy  Glucerna 1.2 Jairo (GLUCERNARTH)  Total Volume for 24 Hours (mL): 1422  Bolus  Total Volume of Bolus (mL):  237  Total # of Feeds: 6  Tube Feed Frequency: Every 4 hours   Tube Feed Start Time: 00:00  Bolus Feed Rate (mL per Hour): 237   Bolus Feed Duration (in Hours): 1  Bolus Feed Instructions:  Provide 6 cans (1 can=237 ml) Glucerna 1.2 daily.  Free Water Flush Instructions:  defer to team (09-09-24 @ 12:47) [Pending Verification By Attending]  Diet, NPO:   Except Medications (09-09-24 @ 06:16) [Active]          A1C with Estimated Average Glucose Result: 5.2 % (09-09-24 @ 08:15)

## 2024-09-09 NOTE — PROGRESS NOTE ADULT - SUBJECTIVE AND OBJECTIVE BOX
NEPHROLOGY-[facility] [phone]        Patient seen and examined in bed.  He was the same         MEDICATIONS  (STANDING):  albuterol    90 MICROgram(s) HFA Inhaler 2 Puff(s) Inhalation every 8 hours  albuterol/ipratropium for Nebulization 3 milliLiter(s) Nebulizer every 6 hours  amLODIPine   Tablet 10 milliGRAM(s) Oral daily  aspirin  chewable 81 milliGRAM(s) Oral daily  atorvastatin 40 milliGRAM(s) Oral at bedtime  dexAMETHasone  Injectable 6 milliGRAM(s) IV Push daily  dextrose 5% + lactated ringers. 1000 milliLiter(s) (60 mL/Hr) IV Continuous <Continuous>  dextrose 5%. 1000 milliLiter(s) (50 mL/Hr) IV Continuous <Continuous>  dextrose 5%. 1000 milliLiter(s) (100 mL/Hr) IV Continuous <Continuous>  dextrose 50% Injectable 12.5 Gram(s) IV Push once  dextrose 50% Injectable 25 Gram(s) IV Push once  dextrose 50% Injectable 25 Gram(s) IV Push once  ferrous    sulfate Liquid 300 milliGRAM(s) Enteral Tube daily  glucagon  Injectable 1 milliGRAM(s) IntraMuscular once  heparin   Injectable 5000 Unit(s) SubCutaneous every 12 hours  hydrALAZINE 25 milliGRAM(s) Oral two times a day  isosorbide   dinitrate Tablet (ISORDIL) 20 milliGRAM(s) Oral three times a day  labetalol 300 milliGRAM(s) Oral two times a day  pancrelipase (VIOKACE) for Occluded enteral feeding tubes 1 Tablet(s) Enteral Tube once  piperacillin/tazobactam IVPB.- 3.375 Gram(s) IV Intermittent once  piperacillin/tazobactam IVPB.- 3.375 Gram(s) IV Intermittent once  piperacillin/tazobactam IVPB.. 3.375 Gram(s) IV Intermittent every 8 hours  QUEtiapine 25 milliGRAM(s) Oral two times a day  remdesivir  IVPB   IV Intermittent   remdesivir  IVPB 100 milliGRAM(s) IV Intermittent every 24 hours  senna 2 Tablet(s) Oral at bedtime  sodium bicarbonate 1300 milliGRAM(s) Oral three times a day      VITAL:  T(C): , Max: 37.7 (09-08-24 @ 17:07)  T(F): , Max: 99.9 (09-08-24 @ 17:07)  HR: 96 (09-09-24 @ 05:42)  BP: 155/97 (09-09-24 @ 05:42)  BP(mean): --  RR: 18 (09-09-24 @ 05:42)  SpO2: 93% (09-09-24 @ 05:42)  Wt(kg): --    I and O's:    09-08 @ 07:01  -  09-09 @ 07:00  --------------------------------------------------------  IN: 180 mL / OUT: 1251 mL / NET: -1071 mL          PHYSICAL EXAM:    Constitutional: NAD;  cachetic   Neck:  No JVD  Respiratory: poor effort   Cardiovascular: S1 and S2  Gastrointestinal: BS+, soft, NT/ND + peg   Extremities: No peripheral edema  Neurological: unable to participate   Psychiatric: Normal mood, normal affect  : No [place]  Skin: No rashes  Access: Not applicable    LABS:                        8.5    8.27  )-----------( 245      ( 09 Sep 2024 06:30 )             26.8     09-09    149<H>  |  116<H>  |  41<H>  ----------------------------<  177<H>  4.7   |  21<L>  |  2.81<H>    Ca    8.4      09 Sep 2024 06:28  Phos  4.9     09-09  Mg     2.0     09-09    TPro  6.5  /  Alb  3.5  /  TBili  0.5  /  DBili  0.1  /  AST  17  /  ALT  14  /  AlkPhos  120  09-09          Urine Studies:  Urinalysis Basic - ( 09 Sep 2024 06:28 )    Color: x / Appearance: x / SG: x / pH: x  Gluc: 177 mg/dL / Ketone: x  / Bili: x / Urobili: x   Blood: x / Protein: x / Nitrite: x   Leuk Esterase: x / RBC: x / WBC x   Sq Epi: x / Non Sq Epi: x / Bacteria: x      Sodium, Random Urine: 95 mmol/L (09-08 @ 20:42)  Creatinine, Random Urine: 85 mg/dL (09-08 @ 20:42)  Protein/Creatinine Ratio Calculation: 5.5 Ratio (09-08 @ 20:42)        RADIOLOGY & ADDITIONAL STUDIES:

## 2024-09-09 NOTE — PROGRESS NOTE ADULT - ASSESSMENT
49-year-old male with past medical history of diabetes, hypertension, HLD. CVA with right sided hemiplegia, non verbal, dysphagia on PEG tube, recently treated pneumonia with meropenem (8/27-9/2) brought in by EMS from [facility] for difficulty breathing and hypoxia 85% RA, difficulty breathing per EMS and nursing home documentation. found to have COVID and QUITA vs advanced CKD. Nephrology consulted for QUITA vs CKD    Suspect QUITA on CKD vs advanced CKD  -?Baseline. no records on file  -QUITA 2/2 prerenal azotemia? ATN? unclear etio at this point in time. will need further testing  -Hypernatremia     COVID virus  -on RDV    dysphagia  -s/p PEG    CVA  -R sided hemiplegia    RECOMMEND:  -Change IVF to D5W   @ 60   -Reduce bicarb 650 TID through peg  - PEG feeds when able;  At present NPO  -monitor resp status closely,    -Dose Rx for CrCl 20-25mL/min;  Creatinine is improving      Sayed [facility]   [phone]

## 2024-09-09 NOTE — PROGRESS NOTE ADULT - SUBJECTIVE AND OBJECTIVE BOX
Date of Service: 09-09-24 @ 06:43           CARDIOLOGY     PROGRESS  NOTE   ________________________________________________    CHIEF COMPLAINT:Patient is a 49y old  Male who presents with a chief complaint of SOB (08 Sep 2024 22:01)  comfortable  	  REVIEW OF SYSTEMS:  CONSTITUTIONAL: No fever, weight loss, or fatigue  EYES: No eye pain, visual disturbances, or discharge  ENT:  No difficulty hearing, tinnitus, vertigo; No sinus or throat pain  NECK: No pain or stiffness  RESPIRATORY: No cough, wheezing, chills or hemoptysis; Decrease Shortness of Breath  CARDIOVASCULAR: No chest pain, palpitations, passing out, dizziness, or leg swelling  GASTROINTESTINAL: No abdominal or epigastric pain. No nausea, vomiting, or hematemesis; No diarrhea or constipation. No melena or hematochezia.  GENITOURINARY: No dysuria, frequency, hematuria, or incontinence  NEUROLOGICAL: No headaches, memory loss, loss of strength, numbness, or tremors  SKIN: No itching, burning, rashes, or lesions   LYMPH Nodes: No enlarged glands  ENDOCRINE: No heat or cold intolerance; No hair loss  MUSCULOSKELETAL: No joint pain or swelling; No muscle, back, or extremity pain  PSYCHIATRIC: No depression, anxiety, mood swings, or difficulty sleeping  HEME/LYMPH: No easy bruising, or bleeding gums  ALLERGY AND IMMUNOLOGIC: No hives or eczema	    [x ] All others negative	  [ ] Unable to obtain    PHYSICAL EXAM:  T(C): 36.8 (09-09-24 @ 05:42), Max: 37.7 (09-08-24 @ 17:07)  HR: 96 (09-09-24 @ 05:42) (96 - 99)  BP: 155/97 (09-09-24 @ 05:42) (139/88 - 161/94)  RR: 18 (09-09-24 @ 05:42) (18 - 18)  SpO2: 93% (09-09-24 @ 05:42) (93% - 100%)  Wt(kg): --  I&O's Summary    07 Sep 2024 07:01  -  08 Sep 2024 07:00  --------------------------------------------------------  IN: 50 mL / OUT: 0 mL / NET: 50 mL    08 Sep 2024 07:01  -  09 Sep 2024 06:43  --------------------------------------------------------  IN: 180 mL / OUT: 1251 mL / NET: -1071 mL        Appearance: Normal	  HEENT:   Normal oral mucosa, PERRL, EOMI	  Lymphatic: No lymphadenopathy  Cardiovascular: Normal S1 S2, No JVD, + murmurs, No edema  Respiratory: rhonchi  Gastrointestinal:  Soft, Non-tender, + BS	  Skin: No rashes, No ecchymoses, No cyanosis	  Neurologic: Non-focal  Extremities: Normal range of motion, No clubbing, cyanosis or edema  Vascular: Peripheral pulses palpable 2+ bilaterally    MEDICATIONS  (STANDING):  albuterol    90 MICROgram(s) HFA Inhaler 2 Puff(s) Inhalation every 8 hours  albuterol/ipratropium for Nebulization 3 milliLiter(s) Nebulizer every 6 hours  amLODIPine   Tablet 10 milliGRAM(s) Oral daily  aspirin  chewable 81 milliGRAM(s) Oral daily  atorvastatin 40 milliGRAM(s) Oral at bedtime  dexAMETHasone  Injectable 6 milliGRAM(s) IV Push daily  dextrose 5% + lactated ringers. 1000 milliLiter(s) (60 mL/Hr) IV Continuous <Continuous>  dextrose 5%. 1000 milliLiter(s) (50 mL/Hr) IV Continuous <Continuous>  dextrose 5%. 1000 milliLiter(s) (100 mL/Hr) IV Continuous <Continuous>  dextrose 50% Injectable 12.5 Gram(s) IV Push once  dextrose 50% Injectable 25 Gram(s) IV Push once  dextrose 50% Injectable 25 Gram(s) IV Push once  ferrous    sulfate Liquid 300 milliGRAM(s) Enteral Tube daily  glucagon  Injectable 1 milliGRAM(s) IntraMuscular once  heparin   Injectable 5000 Unit(s) SubCutaneous every 12 hours  hydrALAZINE 25 milliGRAM(s) Oral two times a day  isosorbide   dinitrate Tablet (ISORDIL) 20 milliGRAM(s) Oral three times a day  labetalol 300 milliGRAM(s) Oral two times a day  LORazepam   Injectable 1 milliGRAM(s) IV Push once  pancrelipase (VIOKACE) for Occluded enteral feeding tubes 1 Tablet(s) Enteral Tube once  piperacillin/tazobactam IVPB. 3.375 Gram(s) IV Intermittent once  piperacillin/tazobactam IVPB.- 3.375 Gram(s) IV Intermittent once  piperacillin/tazobactam IVPB.- 3.375 Gram(s) IV Intermittent once  piperacillin/tazobactam IVPB.. 3.375 Gram(s) IV Intermittent every 8 hours  QUEtiapine 25 milliGRAM(s) Oral two times a day  remdesivir  IVPB 100 milliGRAM(s) IV Intermittent every 24 hours  remdesivir  IVPB   IV Intermittent   senna 2 Tablet(s) Oral at bedtime  sodium bicarbonate 1300 milliGRAM(s) Oral three times a day      TELEMETRY: 	    ECG:  	  RADIOLOGY:  OTHER: 	  	  LABS:	 	    CARDIAC MARKERS:                                8.5    8.27  )-----------( 245      ( 09 Sep 2024 06:30 )             26.8     09-08    145  |  113<H>  |  36<H>  ----------------------------<  83  4.9   |  15<L>  |  3.16<H>    Ca    8.5      08 Sep 2024 07:24    TPro  6.7  /  Alb  3.7  /  TBili  0.7  /  DBili  x   /  AST  20  /  ALT  16  /  AlkPhos  123<H>  09-08    proBNP:   Lipid Profile:   HgA1c:   TSH:   PT/INR - ( 07 Sep 2024 19:41 )   PT: 11.4 sec;   INR: 1.04 ratio         PTT - ( 07 Sep 2024 19:41 )  PTT:40.5 sec    Continue remdesivir, dexamethasone  Plan for 5-day remdesivir course  Plan for 10-day dexamethasone course  Maintain strict resolution precautions  Wean oxygen as able  Would not treat pyuria noted in UA  Follow fever curve and WBC count        Assessment and plan  ---------------------------  49-year-old male with past medical history of diabetes, hypertension, HLD. CVA with right sided hemiplegia, non verbal, dysphagia on PEG tube, recently treated pneumonia with meropenem (8/27-9/2) brought in by EMS from [facility] for difficulty breathing and hypoxia 85% RA, difficulty breathing per EMS and nursing home documentation.  Patient unable to provide additional history.     pt with sig medical and cardiac hx with increasing sob, COVID +, recently treated with pneumoniae  check d dimer if elevated , vq scan to r/o PE  echo  ID eval RDV increase creatinine renal eval  pt with hx of PAF ?AC will discus with Dr Prasad  CAD/ ASHD continue cardiac meds will adjust  increase bp will adjust meds, may increase hydralazine dose  ID noted   ECHO  CKD continue to observe , on sodium bicarb  renal ultrasound  dvt prophylaxis, check d dimer

## 2024-09-09 NOTE — CONSULT NOTE ADULT - ASSESSMENT
49-year-old male with past medical history of diabetes, hypertension, HLD. CVA with right sided hemiplegia, AF (now off AC 2/2 hx ICH), non verbal, dysphagia s/p PEG tube (last known exchange at [facility] 5/17/24 for 16 Fr balloon bumper G tube - [facility] chart review), recently treated pneumonia with meropenem (8/27-9/2) brought in by EMS from [facility] for difficulty breathing and hypoxia 85% RA, difficulty breathing per EMS and nursing home documentation.  Patient unable to provide additional history. Currently admitted and undergoing treatment for COVID-19 pneumonia.     s/p RRT called for hypoxia and distress earlier this AM 9/9 - suspected clogged G tube with return of previously administered bolus feed along with dyspnea/restlessness/anxiety    #dysphagia s/p PEG  bedside unclogging of PEG tube w/ endobrush 9/9/24 AM  CXR image review - appears to have increased stool burden    -ok to use PEG for meds  -await CT AP  -hold off on G tube feeds for now  -Add PPI given pt on steroids and acute COVID infection    #COVID  -remdesivir, steroids per ID recs  -further care per primary team    #CKD w/ QUITA  -management per Renal    Discussed with Nursing, Medicine team    Jesús La PA-C  [facility] Non Teaching GI Service  Available on TEAMS Mon-Fri 8a-4p  After hours and weekend coverage [phone]     49-year-old male with past medical history of diabetes, hypertension, HLD. CVA with right sided hemiplegia, AF (now off AC 2/2 hx ICH), non verbal, dysphagia s/p PEG tube (last known exchange at [facility] 5/17/24 for 16 Fr balloon bumper G tube - [facility] chart review), recently treated pneumonia with meropenem (8/27-9/2) brought in by EMS from [facility] for difficulty breathing and hypoxia 85% RA, difficulty breathing per EMS and nursing home documentation.  Patient unable to provide additional history. Currently admitted and undergoing treatment for COVID-19 pneumonia.     s/p RRT called for hypoxia and distress earlier this AM 9/9 - suspected clogged G tube with return of previously administered bolus feed along with dyspnea/restlessness/anxiety    #dysphagia s/p PEG  bedside unclogging of PEG tube w/ endobrush 9/9/24 AM  9/9/ AM AXR image review - appears to have increased stool burden    -f/u official AXR report  -ok to use PEG for meds  -await CT AP  -hold off on G tube feeds for now  -Add PPI given pt on steroids and acute COVID infection    #COVID  -remdesivir, steroids per ID recs  -further care per primary team    #CKD w/ QUITA  -management per Renal    Discussed with Nursing, Medicine team    Jesús Castellano Non Teaching GI Service  Available on TEAMS Mon-Fri 8a-4p  After hours and weekend coverage [phone]     49-year-old male with past medical history of diabetes, hypertension, HLD. CVA with right sided hemiplegia, AF (now off AC 2/2 hx ICH), non verbal, dysphagia s/p PEG tube (last known exchange at [facility] 5/17/24 for 16 Fr balloon bumper G tube - [facility] chart review), recently treated pneumonia with meropenem (8/27-9/2) brought in by EMS from [facility] for difficulty breathing and hypoxia 85% RA, difficulty breathing per EMS and nursing home documentation.  Patient unable to provide additional history. Currently admitted and undergoing treatment for COVID-19 pneumonia.     s/p RRT called for hypoxia and distress earlier this AM 9/9 - suspected clogged G tube with return of previously administered bolus feed along with dyspnea/restlessness/anxiety    #dysphagia s/p PEG  bedside unclogging of PEG tube w/ endobrush 9/9/24 AM  9/9/ AM AXR image review - appears to have increased stool burden, no free air    -f/u official AXR report  -ok to use PEG for meds  -await CT AP  -hold off on G tube feeds for now  -Add PPI given pt on steroids and acute COVID infection    #COVID  -remdesivir, steroids per ID recs  -further care per primary team    #CKD w/ QUITA  -management per Renal    Discussed with Nursing, Medicine team    Jesús La PA-C  [facility] Non Teaching GI Service  Available on TEAMS Mon-Fri 8a-4p  After hours and weekend coverage [phone]

## 2024-09-09 NOTE — PROVIDER CONTACT NOTE (CHANGE IN STATUS NOTIFICATION) - ACTION/TREATMENT ORDERED:
provider order IV ativan despite pt not having PIV. Provider suggested halidol to calm pt. provider order IV ativan despite pt not having PIV. Provider suggested haldol to calm pt. RN not comfortable & RRT called.

## 2024-09-09 NOTE — PROGRESS NOTE ADULT - ASSESSMENT
49-year-old male     h/o  HTN/ HLD. DM ,   CVA with right sided hemiplegia, non verbal, dysphagia ,has  PEG tube, recently treated pneumonia with meropenem (8/27-9/2) brought in by EMS from [facility] for difficulty breathing and hypoxia 85%   per   nursing home , pt has   cpvid   QUITA      sob/  hypoxia,  from covid/ pna    prior  h/o  pna's/  suspect  pt has   c/c  aspiration// pna/  cxr . infection  vx  fluid  overload       on   remdidvir/  decadron/  iv zosyn    pn peg feeds/.  held  now,  ct a/p.  gi  eval    on iv  fluids     QUITA.  f/p  by enrrique;l  d r ali      CVA,  non verbal, /  r hemiplegia,  bed  bound nal  quadriplegia      ,  follow  fs.  candi millan    quita, resolving     noted  from chart  now, that pt had an rrt/ for  hypoxia    dvt  ppx     defer  feeds  to  gi    pt  is  prior dbr/dni            49-year-old male     h/o  HTN/ HLD. DM ,   CVA with right sided hemiplegia, non verbal, dysphagia ,has  PEG tube, recently treated pneumonia with meropenem (8/27-9/2) brought in by EMS from [facility] for difficulty breathing and hypoxia 85%   per   nursing home , pt has   cpvid   QUITA      sob/  hypoxia,  from covid/ pna    prior  h/o  pna's/  suspect  pt has   c/c  aspiration// pna/  cxr . infection  vx  fluid  overload       on   remdidvir/  decadron/  iv zosyn    pn peg feeds/.  held  now,  ct a/p.  gi  eval    on iv  fluids     QUITA.  f/p  by enrrique;l  d r ali      CVA,  non verbal, /  r hemiplegia,  bed  bound nal  quadriplegia         follow  fs.  candi millan    quita, resolving     noted  from chart  now, that pt had an rrt/ for  hypoxia/ pt with c/c  aspiration. despite  peg    dvt  ppx     defer  feeds  to  gi      pt  is  prior dbr/dni/  epr  n/home paper            49-year-old male     h/o  HTN/ HLD. DM ,   CVA with right sided hemiplegia, non verbal, dysphagia ,has  PEG tube, recently treated pneumonia with meropenem (8/27-9/2) brought in by EMS from [facility] for difficulty breathing and hypoxia 85%   per   nursing home , pt has   cpvid   QUITA      sob/  hypoxia,  from covid/ pna    prior  h/o  pna's/  suspect  pt has   c/c  aspiration// pna/  cxr . infection  vx  fluid  overload       on   remdidvir/  decadron/  iv zosyn       gi  eval.   on iv  fluids     QUITA.  f/p  by enrrique;l  d r ali      CVA,  non verbal, /  r hemiplegia,  bed  bound nal  quadriplegia         follow  fs.  endo  d r monty     quita, resolving     noted  from chart  now, that pt had an rrt/ for  hypoxia/ pt with c/c  aspiration. despite  peg    per gi, ok  to  use peg  for meds. ct a/p,  to  assess  stool burden      pulm to  assist  with  secretion mobilization,  noted pt needed   suctioning/ c/c ,  recurrent  pna's    dvt  ppx         pt  is  prior dbr/dni/  epr  n/home paper

## 2024-09-09 NOTE — DIETITIAN INITIAL EVALUATION ADULT - PROBLEM SELECTOR PLAN 1
s/p a course of Invanz (8/27-9/2) for PNA  - will treat with steroid and Remdesivir (ccl 27)  - Duoneb ATC, O2 supplementation, currently on 2L NC  - DNR/DNI

## 2024-09-09 NOTE — PROVIDER CONTACT NOTE (CHANGE IN STATUS NOTIFICATION) - ASSESSMENT
pt A&Ox0 non verbal at baseline hx of CVA on bolus tube feeds. Last 250cc bolus TF @ 0000. Upon AM care/VS/med pass, pt was soiled incontinent of urine & stool. Pt was restless, agitates & grunting. RN & PCA clean pt & thought pt would settle but was still restless uncomfortable grunting moaning diaphoretic rocking back & forth & pulled out PIV.

## 2024-09-09 NOTE — DIETITIAN INITIAL EVALUATION ADULT - PROBLEM SELECTOR PLAN 4
- will monitor fingersticks on steroid  - will cover with low insulin sliding scales once tube feed started

## 2024-09-09 NOTE — CHART NOTE - NSCHARTNOTEFT_GEN_A_CORE
AZAM RIVERA    Notified by RN patient with critical lab result       Interventions taken                         [facility] #[id number] AZAM RIVERA    Notified by RN patient pulled IV out, PEG clogged, and agitated, then O2 sat drip, RRT called.         Interventions taken                         [facility] #[id number] AZAM RIVERA    Notified by RN patient pulled IV out, PEG clogged, and agitated, then O2 sat drip to 70-80% on 5L NC, RRT called.         Interventions taken       Deep suction during RRT with good result. O2 sat back to baseline.   ID consult for possible asp PNA  VIOKACE ordered for clogged PEG   f/u Labs  cont assess and monitor  f/u with am team.                  [facility]  [facility] #[id number]

## 2024-09-09 NOTE — DIETITIAN INITIAL EVALUATION ADULT - ORAL INTAKE PTA/DIET HISTORY
Per [facility] paper chart: NKFA. Pt taking vitamin C PTA. Pt with dysphagia - PEG in place with tube feeds (no tube feeding regimen documented in paper chart). ? ability to tolerate PO as paper chart includes diet order as follows: Low salt, diabetic, chopped & nectar-thick liquids.

## 2024-09-09 NOTE — CONSULT NOTE ADULT - NS ATTEND AMEND GEN_ALL_CORE FT
1. malfunctioning peg tube, peg tube  unclogged with endobrush    plan await ct scan to confirm placement in stomach prior to use    2. constipation on axr    plan enema as needed  laxative via peg when placement confirmed    3. gi prophlaxsis, pt on steroids,remdisivir for covid    plan IV ppi daily
Chart, labs, vitals, radiology reviewed. Above H&P reviewed and edited where appropriate. Agree with history and physical exam. Agree with assessment and plan. I reviewed the overnight course of events and discussed the care with the patient/ family. All the decisions in assessment and plan are made by me.

## 2024-09-09 NOTE — PROVIDER CONTACT NOTE (OTHER) - ASSESSMENT
pt bp 170/93, pt comfortable in NAD.  pt is ordered to have BP meds in AM but were held r/t RRT & PEG tube clogged. specific provider to RN orders to not use PEG.

## 2024-09-09 NOTE — CONSULT NOTE ADULT - SUBJECTIVE AND OBJECTIVE BOX
CHIEF COMPLAINT: SOB    HPI: PAtient is a 48 yo M w. HTN, HLD, T2DM, prior CVA (w/ R hemiplegia, non verbal), s/p PEG, recent hospitalization for pneumonia (treated w/ Meropenem) who was initially admitted on 9/7 after p/w SOB and hypoxemia at NH. Patient tested positive for COVID here and initiated on treatment. RRT this AM for desaturation and distress after episode of PEG dysfunction. Pulmonary consulted for evaluation.    Labs notable for no leukocytosis WBC 8.2, Na 149, Bicarb 21, Creatinine 2.81 (improved form admission, unknown baseline), VBG 7.21/55/96, BNP 7847, UA -, COVID +.    CXR notable for small R pleural effusion and hazy bilateral opacities    PAST MEDICAL & SURGICAL HISTORY:  CVA (cerebrovascular accident)      HTN (hypertension)      PAF (paroxysmal atrial fibrillation)      Type 2 diabetes mellitus      Stage 3 chronic kidney disease      NSTEMI (non-ST elevation myocardial infarction)      History of pleural effusion      S/P percutaneous endoscopic gastrostomy (PEG) tube placement      AGUSTIN (iron deficiency anemia)      Anxiety and depression      Aphasia      HLD (hyperlipidemia)      S/P percutaneous endoscopic gastrostomy (PEG) tube placement          FAMILY HISTORY:      SOCIAL HISTORY:  Smoking: Unable to obtain as non verbal  Substance Use: Unable to obtain as non verbal  EtOH Use:  Marital Status: [ ] Single [ ]  [ ]  [ ]   Sexual History:   Occupation:  Recent Travel:  Country of Birth:  Advance Directives:    Allergies    No Known Allergies    Intolerances        HOME MEDICATIONS:  Home Medications:  amLODIPine 10 mg oral tablet: 1 tab(s) by gastrostomy tube once a day (08 Sep 2024 06:17)  aspirin 81 mg oral tablet, chewable: 1 tab(s) by gastrostomy tube once a day (08 Sep 2024 06:18)  atorvastatin 40 mg oral tablet: 1 tab(s) by gastrostomy tube once a day (at bedtime) (08 Sep 2024 06:18)  ferrous sulfate 325 mg (65 mg elemental iron) oral delayed release tablet: 1 tab(s) by gastrostomy tube once a day (08 Sep 2024 06:18)  hydrALAZINE 25 mg oral tablet: 1 tab(s) by gastrostomy tube 2 times a day (08 Sep 2024 06:23)  isosorbide dinitrate 20 mg oral tablet: 1 tab(s) by gastrostomy tube 3 times a day (08 Sep 2024 06:23)  labetalol 300 mg oral tablet: 1 tab(s) by gastrostomy tube 2 times a day (08 Sep 2024 06:23)  Lasix 40 mg oral tablet: 1 tab(s) by gastrostomy tube once a day (08 Sep 2024 06:23)  PeriGuard topical ointment: Apply topically to affected area 2 times a day (08 Sep 2024 06:23)  senna (sennosides) 8.6 mg oral tablet: 2 tab(s) by gastrostomy tube once a day (at bedtime) (08 Sep 2024 06:23)  Seroquel 25 mg oral tablet: 1 tab(s) by gastrostomy tube 2 times a day (08 Sep 2024 06:23)  sodium bicarbonate: 1,300 milligram(s) by gastrostomy tube 3 times a day (08 Sep 2024 06:23)  Vitamin C 250 mg oral tablet, chewable: 2 tab(s) by gastrostomy tube once a day (08 Sep 2024 06:23)      REVIEW OF SYSTEMS:  Constitutional: [ ] negative [ ] fevers [ ] chills [ ] weight loss [ ] weight gain  HEENT: [ ] negative [ ] dry eyes [ ] eye irritation [ ] postnasal drip [ ] nasal congestion  CV: [ ] negative  [ ] chest pain [ ] orthopnea [ ] palpitations [ ] murmur  Resp: [ ] negative [ ] cough [ ] shortness of breath [ ] dyspnea [ ] wheezing [ ] sputum [ ] hemoptysis  GI: [ ] negative [ ] nausea [ ] vomiting [ ] diarrhea [ ] constipation [ ] abd pain [ ] dysphagia   : [ ] negative [ ] dysuria [ ] nocturia [ ] hematuria [ ] increased urinary frequency  Musculoskeletal: [ ] negative [ ] back pain [ ] myalgias [ ] arthralgias [ ] fracture  Skin: [ ] negative [ ] rash [ ] itch  Neurological: [ ] negative [ ] headache [ ] dizziness [ ] syncope [ ] weakness [ ] numbness  Psychiatric: [ ] negative [ ] anxiety [ ] depression  Endocrine: [ ] negative [ ] diabetes [ ] thyroid problem  Hematologic/Lymphatic: [ ] negative [ ] anemia [ ] bleeding problem  Allergic/Immunologic: [ ] negative [ ] itchy eyes [ ] nasal discharge [ ] hives [ ] angioedema  [ ] All other systems negative  [X] Unable to obtain as non verbal    OBJECTIVE:  ICU Vital Signs Last 24 Hrs  T(C): 36.9 (09 Sep 2024 13:05), Max: 37.7 (08 Sep 2024 17:07)  T(F): 98.5 (09 Sep 2024 13:05), Max: 99.9 (08 Sep 2024 17:07)  HR: 94 (09 Sep 2024 13:05) (91 - 99)  BP: 167/87 (09 Sep 2024 13:05) (152/79 - 167/87)  BP(mean): --  ABP: --  ABP(mean): --  RR: 18 (09 Sep 2024 13:05) (18 - 18)  SpO2: 93% (09 Sep 2024 13:05) (93% - 100%)    O2 Parameters below as of 09 Sep 2024 13:05  Patient On (Oxygen Delivery Method): nasal cannula  O2 Flow (L/min): 2            09-08 @ 07:01  -  09-09 @ 07:00  --------------------------------------------------------  IN: 180 mL / OUT: 1251 mL / NET: -1071 mL    09-09 @ 07:01  -  09-09 @ 13:35  --------------------------------------------------------  IN: 0 mL / OUT: 0 mL / NET: 0 mL      CAPILLARY BLOOD GLUCOSE      POCT Blood Glucose.: 184 mg/dL (09 Sep 2024 11:42)      PHYSICAL EXAM:  General: Appears uncomfortably  HEENT: EOMI, PERRLA  Neck: Supple  Respiratory: Intermittent expiratory wheeze  Cardiovascular: S1S2, RRR  Abdomen: Soft, NTND, PEG +  Extremities: No edema  Skin: Warm and dry  Neurological: Awake able to indicate yes or no    LINES:     HOSPITAL MEDICATIONS:  Standing Meds:  albuterol    90 MICROgram(s) HFA Inhaler 2 Puff(s) Inhalation every 8 hours  albuterol/ipratropium for Nebulization 3 milliLiter(s) Nebulizer every 6 hours  amLODIPine   Tablet 10 milliGRAM(s) Oral daily  aspirin  chewable 81 milliGRAM(s) Oral daily  atorvastatin 40 milliGRAM(s) Oral at bedtime  dexAMETHasone  Injectable 6 milliGRAM(s) IV Push daily  dextrose 5%. 1000 milliLiter(s) IV Continuous <Continuous>  dextrose 5%. 1000 milliLiter(s) IV Continuous <Continuous>  dextrose 5%. 1000 milliLiter(s) IV Continuous <Continuous>  dextrose 50% Injectable 12.5 Gram(s) IV Push once  dextrose 50% Injectable 25 Gram(s) IV Push once  dextrose 50% Injectable 25 Gram(s) IV Push once  ferrous    sulfate Liquid 300 milliGRAM(s) Enteral Tube daily  glucagon  Injectable 1 milliGRAM(s) IntraMuscular once  heparin   Injectable 5000 Unit(s) SubCutaneous every 12 hours  hydrALAZINE 25 milliGRAM(s) Oral two times a day  insulin lispro (ADMELOG) corrective regimen sliding scale   SubCutaneous every 6 hours  isosorbide   dinitrate Tablet (ISORDIL) 20 milliGRAM(s) Oral three times a day  labetalol 300 milliGRAM(s) Oral two times a day  pancrelipase (VIOKACE) for Occluded enteral feeding tubes 1 Tablet(s) Enteral Tube once  pantoprazole  Injectable 40 milliGRAM(s) IV Push daily  piperacillin/tazobactam IVPB.- 3.375 Gram(s) IV Intermittent once  piperacillin/tazobactam IVPB.- 3.375 Gram(s) IV Intermittent once  piperacillin/tazobactam IVPB.. 3.375 Gram(s) IV Intermittent every 8 hours  QUEtiapine 25 milliGRAM(s) Oral two times a day  remdesivir  IVPB   IV Intermittent   remdesivir  IVPB 100 milliGRAM(s) IV Intermittent every 24 hours  senna 2 Tablet(s) Oral at bedtime  sodium bicarbonate 650 milliGRAM(s) Oral three times a day      PRN Meds:  acetaminophen   Oral Liquid .. 650 milliGRAM(s) Oral every 6 hours PRN  dextrose Oral Gel 15 Gram(s) Oral once PRN  haloperidol    Injectable 2 milliGRAM(s) IntraMuscular once PRN      LABS:                        8.5    8.27  )-----------( 245      ( 09 Sep 2024 06:30 )             26.8     Hgb Trend: 8.5<--, 8.3<--, 8.5<--  09-09    149<H>  |  116<H>  |  41<H>  ----------------------------<  177<H>  4.7   |  21<L>  |  2.81<H>    Ca    8.4      09 Sep 2024 06:28  Phos  4.9     09-09  Mg     2.0     09-09    TPro  6.5  /  Alb  3.5  /  TBili  0.5  /  DBili  0.1  /  AST  17  /  ALT  14  /  AlkPhos  120  09-09    Creatinine Trend: 2.81<--, 3.16<--, 3.27<--  PT/INR - ( 09 Sep 2024 06:27 )   PT: 11.6 sec;   INR: 1.11 ratio         PTT - ( 07 Sep 2024 19:41 )  PTT:40.5 sec  Urinalysis Basic - ( 09 Sep 2024 06:28 )    Color: x / Appearance: x / SG: x / pH: x  Gluc: 177 mg/dL / Ketone: x  / Bili: x / Urobili: x   Blood: x / Protein: x / Nitrite: x   Leuk Esterase: x / RBC: x / WBC x   Sq Epi: x / Non Sq Epi: x / Bacteria: x        Venous Blood Gas:  09-09 @ 06:19  7.21/55/96/22/97.0  VBG Lactate: 1.0  Venous Blood Gas:  09-07 @ 19:25  7.29/41/56/20/86.1  VBG Lactate: 0.5      MICROBIOLOGY:     Culture - Urine (collected 07 Sep 2024 20:32)  Source: Clean Catch Clean Catch (Midstream)  Final Report (09 Sep 2024 07:20):    No growth    Culture - Blood (collected 07 Sep 2024 19:25)  Source: .Blood Blood-Peripheral  Preliminary Report (09 Sep 2024 02:01):    No growth at 24 hours    Culture - Blood (collected 07 Sep 2024 19:10)  Source: .Blood Blood-Peripheral  Preliminary Report (09 Sep 2024 02:01):    No growth at 24 hours        RADIOLOGY:  [ ] Reviewed and interpreted by me    PULMONARY FUNCTION TESTS:    EKG:

## 2024-09-09 NOTE — DIETITIAN INITIAL EVALUATION ADULT - ENTERAL
When feasible, Recommend providing 6 cans (1 can = 237 ml) of Glucerna 1.2 daily as tolerated. Regimen at goal will provide: 1422 ml formula, 1706 kcal/day (25 kcal/kg), 85.3 g pro/day (1.25 g pro/kg), 1137 ml free water based on dosing wt 68 kg.   	- Monitor Sodium and Phos levels daily.

## 2024-09-09 NOTE — PROGRESS NOTE ADULT - SUBJECTIVE AND OBJECTIVE BOX
date of service: 09-09-24 @ 10:27  afberile  REVIEW OF SYSTEMS:  CONSTITUTIONAL: No fever,  no  weight loss  ENT:  No  tinnitus,   no   vertigo  NECK: No pain or stiffness  RESPIRATORY: No cough, wheezing, chills or hemoptysis;    No Shortness of Breath  CARDIOVASCULAR: No chest pain, palpitations, dizziness  GASTROINTESTINAL: No abdominal or epigastric pain. No nausea, vomiting, or hematemesis; No diarrhea  No melena or hematochezia.  GENITOURINARY: No dysuria, frequency, hematuria, or incontinence  NEUROLOGICAL: No headaches  SKIN: No itching,  no   rash  LYMPH Nodes: No enlarged glands  ENDOCRINE: No heat or cold intolerance  MUSCULOSKELETAL: No joint pain or swelling  PSYCHIATRIC: No depression, anxiety  HEME/LYMPH: No easy bruising, or bleeding gums  ALLERGY AND IMMUNOLOGIC: No hives or eczema	    MEDICATIONS  (STANDING):  albuterol    90 MICROgram(s) HFA Inhaler 2 Puff(s) Inhalation every 8 hours  albuterol/ipratropium for Nebulization 3 milliLiter(s) Nebulizer every 6 hours  amLODIPine   Tablet 10 milliGRAM(s) Oral daily  aspirin  chewable 81 milliGRAM(s) Oral daily  atorvastatin 40 milliGRAM(s) Oral at bedtime  dexAMETHasone  Injectable 6 milliGRAM(s) IV Push daily  dextrose 5%. 1000 milliLiter(s) (50 mL/Hr) IV Continuous <Continuous>  dextrose 5%. 1000 milliLiter(s) (60 mL/Hr) IV Continuous <Continuous>  dextrose 5%. 1000 milliLiter(s) (100 mL/Hr) IV Continuous <Continuous>  dextrose 50% Injectable 12.5 Gram(s) IV Push once  dextrose 50% Injectable 25 Gram(s) IV Push once  dextrose 50% Injectable 25 Gram(s) IV Push once  ferrous    sulfate Liquid 300 milliGRAM(s) Enteral Tube daily  glucagon  Injectable 1 milliGRAM(s) IntraMuscular once  heparin   Injectable 5000 Unit(s) SubCutaneous every 12 hours  hydrALAZINE 25 milliGRAM(s) Oral two times a day  isosorbide   dinitrate Tablet (ISORDIL) 20 milliGRAM(s) Oral three times a day  labetalol 300 milliGRAM(s) Oral two times a day  pancrelipase (VIOKACE) for Occluded enteral feeding tubes 1 Tablet(s) Enteral Tube once  piperacillin/tazobactam IVPB.- 3.375 Gram(s) IV Intermittent once  piperacillin/tazobactam IVPB.- 3.375 Gram(s) IV Intermittent once  piperacillin/tazobactam IVPB.. 3.375 Gram(s) IV Intermittent every 8 hours  QUEtiapine 25 milliGRAM(s) Oral two times a day  remdesivir  IVPB   IV Intermittent   remdesivir  IVPB 100 milliGRAM(s) IV Intermittent every 24 hours  senna 2 Tablet(s) Oral at bedtime  sodium bicarbonate 650 milliGRAM(s) Oral three times a day    MEDICATIONS  (PRN):  acetaminophen   Oral Liquid .. 650 milliGRAM(s) Oral every 6 hours PRN Temp greater or equal to 38C (100.4F), Moderate Pain (4 - 6)  dextrose Oral Gel 15 Gram(s) Oral once PRN Blood Glucose LESS THAN 70 milliGRAM(s)/deciliter  haloperidol    Injectable 2 milliGRAM(s) IntraMuscular once PRN Agitation      Vital Signs Last 24 Hrs  T(C): 36.8 (09 Sep 2024 05:42), Max: 37.7 (08 Sep 2024 17:07)  T(F): 98.3 (09 Sep 2024 05:42), Max: 99.9 (08 Sep 2024 17:07)  HR: 96 (09 Sep 2024 05:42) (96 - 99)  BP: 155/97 (09 Sep 2024 05:42) (152/79 - 161/94)  BP(mean): --  RR: 18 (09 Sep 2024 05:42) (18 - 18)  SpO2: 93% (09 Sep 2024 05:42) (93% - 100%)    Parameters below as of 09 Sep 2024 05:42  Patient On (Oxygen Delivery Method): nasal cannula  O2 Flow (L/min): 2    CAPILLARY BLOOD GLUCOSE      POCT Blood Glucose.: 171 mg/dL (09 Sep 2024 09:18)  POCT Blood Glucose.: 169 mg/dL (09 Sep 2024 06:06)  POCT Blood Glucose.: 107 mg/dL (08 Sep 2024 17:24)    I&O's Summary    08 Sep 2024 07:01  -  09 Sep 2024 07:00  --------------------------------------------------------  IN: 180 mL / OUT: 1251 mL / NET: -1071 mL          Appearance: Normal	  HEENT:   Normal oral mucosa, PERRL, EOMI	  Lymphatic: No lymphadenopathy  Cardiovascular: Normal S1 S2, No JVD  Respiratory: Lungs clear to auscultation	  Gastrointestinal:  Soft, Non-tender, + BS	  Skin: No rash, No ecchymoses	  Extremities:     LABS:                        8.5    8.27  )-----------( 245      ( 09 Sep 2024 06:30 )             26.8     09-09    149<H>  |  116<H>  |  41<H>  ----------------------------<  177<H>  4.7   |  21<L>  |  2.81<H>    Ca    8.4      09 Sep 2024 06:28  Phos  4.9     09-09  Mg     2.0     09-09    TPro  6.5  /  Alb  3.5  /  TBili  0.5  /  DBili  0.1  /  AST  17  /  ALT  14  /  AlkPhos  120  09-09    PT/INR - ( 09 Sep 2024 06:27 )   PT: 11.6 sec;   INR: 1.11 ratio         PTT - ( 07 Sep 2024 19:41 )  PTT:40.5 sec      Urinalysis Basic - ( 09 Sep 2024 06:28 )    Color: x / Appearance: x / SG: x / pH: x  Gluc: 177 mg/dL / Ketone: x  / Bili: x / Urobili: x   Blood: x / Protein: x / Nitrite: x   Leuk Esterase: x / RBC: x / WBC x   Sq Epi: x / Non Sq Epi: x / Bacteria: x          09-09 @ 06:19  4.9  96              Consultant(s) Notes Reviewed:      Care Discussed with Consultants/Other Providers:       date of service: 09-09-24 @ 10:27  afberile  REVIEW OF SYSTEMS:  CONSTITUTIONAL: No fever,  no  weight loss  ENT:  No  tinnitus,   no   vertigo  NECK: No pain or stiffness  RESPIRATORY: No cough, wheezing, chills or hemoptysis;    No Shortness of Breath  CARDIOVASCULAR: No chest pain, palpitations, dizziness  GASTROINTESTINAL: No abdominal or epigastric pain. No nausea, vomiting, or hematemesis; No diarrhea  No melena or hematochezia.  GENITOURINARY: No dysuria, frequency, hematuria, or incontinence  NEUROLOGICAL: No headaches  SKIN: No itching,  no   rash  LYMPH Nodes: No enlarged glands  ENDOCRINE: No heat or cold intolerance  MUSCULOSKELETAL: No joint pain or swelling  PSYCHIATRIC: No depression, anxiety  HEME/LYMPH: No easy bruising, or bleeding gums  ALLERGY AND IMMUNOLOGIC: No hives or eczema	    MEDICATIONS  (STANDING):  albuterol    90 MICROgram(s) HFA Inhaler 2 Puff(s) Inhalation every 8 hours  albuterol/ipratropium for Nebulization 3 milliLiter(s) Nebulizer every 6 hours  amLODIPine   Tablet 10 milliGRAM(s) Oral daily  aspirin  chewable 81 milliGRAM(s) Oral daily  atorvastatin 40 milliGRAM(s) Oral at bedtime  dexAMETHasone  Injectable 6 milliGRAM(s) IV Push daily  dextrose 5%. 1000 milliLiter(s) (50 mL/Hr) IV Continuous <Continuous>  dextrose 5%. 1000 milliLiter(s) (60 mL/Hr) IV Continuous <Continuous>  dextrose 5%. 1000 milliLiter(s) (100 mL/Hr) IV Continuous <Continuous>  dextrose 50% Injectable 12.5 Gram(s) IV Push once  dextrose 50% Injectable 25 Gram(s) IV Push once  dextrose 50% Injectable 25 Gram(s) IV Push once  ferrous    sulfate Liquid 300 milliGRAM(s) Enteral Tube daily  glucagon  Injectable 1 milliGRAM(s) IntraMuscular once  heparin   Injectable 5000 Unit(s) SubCutaneous every 12 hours  hydrALAZINE 25 milliGRAM(s) Oral two times a day  isosorbide   dinitrate Tablet (ISORDIL) 20 milliGRAM(s) Oral three times a day  labetalol 300 milliGRAM(s) Oral two times a day  pancrelipase (VIOKACE) for Occluded enteral feeding tubes 1 Tablet(s) Enteral Tube once  piperacillin/tazobactam IVPB.- 3.375 Gram(s) IV Intermittent once  piperacillin/tazobactam IVPB.- 3.375 Gram(s) IV Intermittent once  piperacillin/tazobactam IVPB.. 3.375 Gram(s) IV Intermittent every 8 hours  QUEtiapine 25 milliGRAM(s) Oral two times a day  remdesivir  IVPB   IV Intermittent   remdesivir  IVPB 100 milliGRAM(s) IV Intermittent every 24 hours  senna 2 Tablet(s) Oral at bedtime  sodium bicarbonate 650 milliGRAM(s) Oral three times a day    MEDICATIONS  (PRN):  acetaminophen   Oral Liquid .. 650 milliGRAM(s) Oral every 6 hours PRN Temp greater or equal to 38C (100.4F), Moderate Pain (4 - 6)  dextrose Oral Gel 15 Gram(s) Oral once PRN Blood Glucose LESS THAN 70 milliGRAM(s)/deciliter  haloperidol    Injectable 2 milliGRAM(s) IntraMuscular once PRN Agitation      Vital Signs Last 24 Hrs  T(C): 36.8 (09 Sep 2024 05:42), Max: 37.7 (08 Sep 2024 17:07)  T(F): 98.3 (09 Sep 2024 05:42), Max: 99.9 (08 Sep 2024 17:07)  HR: 96 (09 Sep 2024 05:42) (96 - 99)  BP: 155/97 (09 Sep 2024 05:42) (152/79 - 161/94)  BP(mean): --  RR: 18 (09 Sep 2024 05:42) (18 - 18)  SpO2: 93% (09 Sep 2024 05:42) (93% - 100%)    Parameters below as of 09 Sep 2024 05:42  Patient On (Oxygen Delivery Method): nasal cannula  O2 Flow (L/min): 2    CAPILLARY BLOOD GLUCOSE      POCT Blood Glucose.: 171 mg/dL (09 Sep 2024 09:18)  POCT Blood Glucose.: 169 mg/dL (09 Sep 2024 06:06)  POCT Blood Glucose.: 107 mg/dL (08 Sep 2024 17:24)    I&O's Summary    08 Sep 2024 07:01  -  09 Sep 2024 07:00  --------------------------------------------------------  IN: 180 mL / OUT: 1251 mL / NET: -1071 mL          Appearance: Normal	  HEENT:   Normal oral mucosa, PERRL, EOMI	  Lymphatic: No lymphadenopathy  Cardiovascular: Normal S1 S2, No JVD  Respiratory: Lungs   few  rhonchi	  Gastrointestinal:  Soft, Non-tender, + BS	  Skin: No rash, No ecchymoses	  non verbal/  at  times. can trach with  eyes    LABS:                        8.5    8.27  )-----------( 245      ( 09 Sep 2024 06:30 )             26.8     09-09    149<H>  |  116<H>  |  41<H>  ----------------------------<  177<H>  4.7   |  21<L>  |  2.81<H>    Ca    8.4      09 Sep 2024 06:28  Phos  4.9     09-09  Mg     2.0     09-09    TPro  6.5  /  Alb  3.5  /  TBili  0.5  /  DBili  0.1  /  AST  17  /  ALT  14  /  AlkPhos  120  09-09    PT/INR - ( 09 Sep 2024 06:27 )   PT: 11.6 sec;   INR: 1.11 ratio         PTT - ( 07 Sep 2024 19:41 )  PTT:40.5 sec      Urinalysis Basic - ( 09 Sep 2024 06:28 )    Color: x / Appearance: x / SG: x / pH: x  Gluc: 177 mg/dL / Ketone: x  / Bili: x / Urobili: x   Blood: x / Protein: x / Nitrite: x   Leuk Esterase: x / RBC: x / WBC x   Sq Epi: x / Non Sq Epi: x / Bacteria: x          09-09 @ 06:19  4.9  96              Consultant(s) Notes Reviewed:      Care Discussed with Consultants/Other Providers:

## 2024-09-09 NOTE — DIETITIAN INITIAL EVALUATION ADULT - NSFNSGIIOFT_GEN_A_CORE
09-08-24 @ 07:01  -  09-09-24 @ 07:00  --------------------------------------------------------  OUT:    PEG (Percutaneous Endoscopic Gastrostomy) Tube (mL): 250 mL  Total OUT: 250 mL    Total NET: -250 mL    Last BM 9/08 per chart. Bowel regimen: senna.

## 2024-09-09 NOTE — DIETITIAN INITIAL EVALUATION ADULT - OTHER CALCULATIONS
Fluid needs deferred to team.  Estimated needs using dosing wt with consideration for QUITA on CKD.

## 2024-09-09 NOTE — DIETITIAN INITIAL EVALUATION ADULT - ENERGY INTAKE
EN regimen d/c this AM () with possible PEG dysfunction. Previous regimen as ordered: Jevity 1.5 @250 ml bolus q8hrs.  	- Regimen at goal was providin ml formula, 1125 kcal (16.5 kcal/kg), 48 g pro (0.7 g pro/kg), 570 ml free water based on dosing wt 68 kg. EN regimen as ordered was NOT meeting pt's EER/protein needs.  NPO

## 2024-09-09 NOTE — DIETITIAN INITIAL EVALUATION ADULT - ADD RECOMMEND
1) Recommend Multivitamin, thiamine daily pending no medical contraindications.  2) Continue to monitor weight, labs, skin, GI status, and diet.  3) RD remains available to adjust EN regimen PRN.

## 2024-09-09 NOTE — DIETITIAN INITIAL EVALUATION ADULT - PERTINENT MEDS FT
MEDICATIONS  (STANDING):  albuterol    90 MICROgram(s) HFA Inhaler 2 Puff(s) Inhalation every 8 hours  albuterol/ipratropium for Nebulization 3 milliLiter(s) Nebulizer every 6 hours  amLODIPine   Tablet 10 milliGRAM(s) Oral daily  aspirin  chewable 81 milliGRAM(s) Oral daily  atorvastatin 40 milliGRAM(s) Oral at bedtime  dexAMETHasone  Injectable 6 milliGRAM(s) IV Push daily  dextrose 5%. 1000 milliLiter(s) (50 mL/Hr) IV Continuous <Continuous>  dextrose 5%. 1000 milliLiter(s) (100 mL/Hr) IV Continuous <Continuous>  dextrose 5%. 1000 milliLiter(s) (60 mL/Hr) IV Continuous <Continuous>  dextrose 50% Injectable 12.5 Gram(s) IV Push once  dextrose 50% Injectable 25 Gram(s) IV Push once  dextrose 50% Injectable 25 Gram(s) IV Push once  ferrous    sulfate Liquid 300 milliGRAM(s) Enteral Tube daily  glucagon  Injectable 1 milliGRAM(s) IntraMuscular once  heparin   Injectable 5000 Unit(s) SubCutaneous every 12 hours  hydrALAZINE 25 milliGRAM(s) Oral two times a day  isosorbide   dinitrate Tablet (ISORDIL) 20 milliGRAM(s) Oral three times a day  labetalol 300 milliGRAM(s) Oral two times a day  pancrelipase (VIOKACE) for Occluded enteral feeding tubes 1 Tablet(s) Enteral Tube once  piperacillin/tazobactam IVPB.- 3.375 Gram(s) IV Intermittent once  piperacillin/tazobactam IVPB.- 3.375 Gram(s) IV Intermittent once  piperacillin/tazobactam IVPB.. 3.375 Gram(s) IV Intermittent every 8 hours  QUEtiapine 25 milliGRAM(s) Oral two times a day  remdesivir  IVPB 100 milliGRAM(s) IV Intermittent every 24 hours  remdesivir  IVPB   IV Intermittent   senna 2 Tablet(s) Oral at bedtime  sodium bicarbonate 650 milliGRAM(s) Oral three times a day    MEDICATIONS  (PRN):  acetaminophen   Oral Liquid .. 650 milliGRAM(s) Oral every 6 hours PRN Temp greater or equal to 38C (100.4F), Moderate Pain (4 - 6)  dextrose Oral Gel 15 Gram(s) Oral once PRN Blood Glucose LESS THAN 70 milliGRAM(s)/deciliter  haloperidol    Injectable 2 milliGRAM(s) IntraMuscular once PRN Agitation

## 2024-09-09 NOTE — CONSULT NOTE ADULT - ASSESSMENT
Patient is a 50 yo M w. HTN, HLD, T2DM, prior CVA (w/ R hemiplegia, non verbal), s/p PEG, recent hospitalization for pneumonia (treated w/ Meropenem) who was initially admitted on 9/7 after p/w SOB and hypoxemia at NH. Patient tested positive for COVID here and initiated on treatment. RRT this AM for desaturation and distress after episode of PEG dysfunction. Pulmonary consulted for evaluation.    Labs notable for no leukocytosis WBC 8.2, Na 149, Bicarb 21, Creatinine 2.81 (improved form admission, unknown baseline), VBG 7.21/55/96, BNP 7847, UA -, COVID +.    CXR notable for small R pleural effusion and hazy bilateral opacities    #COVID Pneumonia  #Acute hypoxemic respiratory failure  #Possible Aspiration  - c/w supplemental O2, wean as tolerated  - c/w bronchodilators ATC for now  - Agree w/ empiric Zosyn for now  - Agree w/ COVID treatment with Decadron and Remdesivir  - Recommend checking TTE  - Patient ordered for CTAP, can obtain chest as well    Bar Levine MD  Pulmonary & Critical Care

## 2024-09-09 NOTE — DIETITIAN INITIAL EVALUATION ADULT - PERTINENT LABORATORY DATA
09-09    149<H>  |  116<H>  |  41<H>  ----------------------------<  177<H>  4.7   |  21<L>  |  2.81<H>    Ca    8.4      09 Sep 2024 06:28  Phos  4.9     09-09  Mg     2.0     09-09    TPro  6.5  /  Alb  3.5  /  TBili  0.5  /  DBili  0.1  /  AST  17  /  ALT  14  /  AlkPhos  120  09-09  POCT Blood Glucose.: 171 mg/dL (09-09-24 @ 09:18)  A1C with Estimated Average Glucose Result: 5.2 % (09-09-24 @ 08:15)

## 2024-09-09 NOTE — RAPID RESPONSE TEAM SUMMARY - NSADDTLFINDINGSRRT_GEN_ALL_CORE
Vital signs 138/88, , RR 25, rectal 97.3F, satting upper 70s low 80s on NRB. Patient groaning in apparent pain, nonverbal but tracking. Lungs sounds diffusely junky with associated expiratory wheezing, belly breathing. Exam otherwise unremarkable. Patient was deep suctioned by RT with great success, saturation jumped to 100%. Began to wean patient to venti 50% with good tolerance. CXR with mildly improved b/l opacities from prior. After suction, lung exam improved but still persistent wheezing, given duonebs. Full set of labs sent, gas mildly acidotic but no lactate. After nebs, the patient was started on zosyn, reasoning as below. After all interventions as above and below, patient markedly improved and on 40% venti mask, consensus to stop RRT at this time for primary team to follow up labs.     DDx at this time includes aspiration 2/2 PEG tube dysfunction, mucus plugging, progressively worsening COVID. Spoke with primary team, would opt to start Zosyn for possible aspiration given PEG dysfunction. Suction seemed to resolve hypoxia, patient on venti 40% currently, primary team to wean as tolerated. Recommend urgent CT AP to assess PEG placement and function.

## 2024-09-09 NOTE — DIETITIAN INITIAL EVALUATION ADULT - REASON INDICATOR FOR ASSESSMENT
Nutrition Consult for Tube Feeding.   Source: Paper chart from Kele[facility] DELUNA, Electronic Medical Record, team. Pt non-verbal/unable to obtain subjective hx.     Chart reviewed, events noted.

## 2024-09-09 NOTE — DIETITIAN INITIAL EVALUATION ADULT - NSPROEDAABILITYLEARN_GEN_A_NUR
nonverbal. Education not feasible/warranted at this time. RD to follow up per protocol./communication limitations

## 2024-09-09 NOTE — RAPID RESPONSE TEAM SUMMARY - NSOTHERINTERVENTIONSRRT_GEN_ALL_CORE
- Deep suctioning with success  - Uncapping and draining of PEG tube   - C/f aspiration event, started on zosyn  - Rec primary team follow up on labs and get urgent CT A/P to assess feeding tube location

## 2024-09-09 NOTE — CONSULT NOTE ADULT - PROBLEM SELECTOR RECOMMENDATION 9
Started on insulin sliding scale q6   Will continue current insulin regimen for now.   Will continue monitoring FS, log, and glucose trends, will Follow up.

## 2024-09-09 NOTE — CONSULT NOTE ADULT - SUBJECTIVE AND OBJECTIVE BOX
**** INCOMPLETE NOTE ****    HPI:  49-year-old male with past medical history of diabetes, hypertension, HLD. CVA with right sided hemiplegia, non verbal, dysphagia s/p PEG tube (last known exchange at [facility]17[facility] recently treated pneumonia with meropenem (8/27-9/2) brought in by EMS from [facility] for difficulty breathing and hypoxia 85% RA, difficulty breathing per EMS and nursing home documentation.  Patient unable to provide additional history. Currently admitted and undergoing treatment for COVID-19 pneumonia.     s/p RRT called for hypoxia and distress earlier this AM. "Nurse says that earlier in the night, she gave the tube feed bolus and later when she uncapped the tube to give medication, all of the feed that was injected for last bolus came out rapidly all over bed. Around 430 the patient began to groan and cry out loudly. Patient is nonverbal, confirmed DNR/DNI status based on Molst in chart and [facility] note." s/p deep suction by RT with improved O2 saturation, placed on Venti mask,, s/p duonebs and started on Zosyn    GI asked to evaluate        PAST MEDICAL & SURGICAL HISTORY:  CVA (cerebrovascular accident)  HTN (hypertension)  PAF (paroxysmal atrial fibrillation)  Type 2 diabetes mellitus  Stage 3 chronic kidney disease  NSTEMI (non-ST elevation myocardial infarction)  History of pleural effusion  S/P percutaneous endoscopic gastrostomy (PEG) tube placement  AGUSTIN (iron deficiency anemia)  Anxiety and depression  Aphasia  HLD (hyperlipidemia)        Allergies  No Known Allergies      MEDICATIONS  (STANDING):  albuterol    90 MICROgram(s) HFA Inhaler 2 Puff(s) Inhalation every 8 hours  albuterol/ipratropium for Nebulization 3 milliLiter(s) Nebulizer every 6 hours  amLODIPine   Tablet 10 milliGRAM(s) Oral daily  aspirin  chewable 81 milliGRAM(s) Oral daily  atorvastatin 40 milliGRAM(s) Oral at bedtime  dexAMETHasone  Injectable 6 milliGRAM(s) IV Push daily  dextrose 5% + lactated ringers. 1000 milliLiter(s) (60 mL/Hr) IV Continuous <Continuous>  dextrose 5%. 1000 milliLiter(s) (50 mL/Hr) IV Continuous <Continuous>  dextrose 5%. 1000 milliLiter(s) (100 mL/Hr) IV Continuous <Continuous>  dextrose 50% Injectable 12.5 Gram(s) IV Push once  dextrose 50% Injectable 25 Gram(s) IV Push once  dextrose 50% Injectable 25 Gram(s) IV Push once  ferrous    sulfate Liquid 300 milliGRAM(s) Enteral Tube daily  glucagon  Injectable 1 milliGRAM(s) IntraMuscular once  heparin   Injectable 5000 Unit(s) SubCutaneous every 12 hours  hydrALAZINE 25 milliGRAM(s) Oral two times a day  isosorbide   dinitrate Tablet (ISORDIL) 20 milliGRAM(s) Oral three times a day  labetalol 300 milliGRAM(s) Oral two times a day  pancrelipase (VIOKACE) for Occluded enteral feeding tubes 1 Tablet(s) Enteral Tube once  piperacillin/tazobactam IVPB.- 3.375 Gram(s) IV Intermittent once  piperacillin/tazobactam IVPB.- 3.375 Gram(s) IV Intermittent once  piperacillin/tazobactam IVPB.. 3.375 Gram(s) IV Intermittent every 8 hours  QUEtiapine 25 milliGRAM(s) Oral two times a day  remdesivir  IVPB 100 milliGRAM(s) IV Intermittent every 24 hours  remdesivir  IVPB   IV Intermittent   senna 2 Tablet(s) Oral at bedtime  sodium bicarbonate 1300 milliGRAM(s) Oral three times a day    MEDICATIONS  (PRN):  acetaminophen   Oral Liquid .. 650 milliGRAM(s) Oral every 6 hours PRN Temp greater or equal to 38C (100.4F), Moderate Pain (4 - 6)  dextrose Oral Gel 15 Gram(s) Oral once PRN Blood Glucose LESS THAN 70 milliGRAM(s)/deciliter  haloperidol    Injectable 2 milliGRAM(s) IntraMuscular once PRN Agitation      Social History:  SNF resident    Family History   IBD (  ) Yes   (  ) No  GI Malignancy (  )  Yes    (  ) No      Advanced Directives: (     ) None    (   X  ) DNR    ( X    ) DNI    (  X   ) Health Care Proxy: next of kin is sister in [place] ([phone])       Review of Systems:  unable to obtain; see HPI  History from chart review and d/w team      Vital Signs Last 24 Hrs  T(C): 36.8 (09 Sep 2024 05:42), Max: 37.7 (08 Sep 2024 17:07)  T(F): 98.3 (09 Sep 2024 05:42), Max: 99.9 (08 Sep 2024 17:07)  HR: 96 (09 Sep 2024 05:42) (96 - 99)  BP: 155/97 (09 Sep 2024 05:42) (152/79 - 161/94)  BP(mean): --  RR: 18 (09 Sep 2024 05:42) (18 - 18)  SpO2: 93% (09 Sep 2024 05:42) (93% - 100%)    Parameters below as of 09 Sep 2024 05:42  Patient On (Oxygen Delivery Method): nasal cannula  O2 Flow (L/min): 2      PHYSICAL EXAM:    Constitutional:  Neck:   Respiratory:   Cardiovascular:   Gastrointestinal:  Extremities:   Vascular:   Neurological:   Psychiatric:   Skin:         LABS:                        8.5    8.27  )-----------( 245      ( 09 Sep 2024 06:30 )             26.8     09-09    149<H>  |  116<H>  |  41<H>  ----------------------------<  177<H>  4.7   |  21<L>  |  2.81<H>    Ca    8.4      09 Sep 2024 06:28  Phos  4.9     09-09  Mg     2.0     09-09    TPro  6.5  /  Alb  3.5  /  TBili  0.5  /  DBili  0.1  /  AST  17  /  ALT  14  /  AlkPhos  120  09-09    PT/INR - ( 09 Sep 2024 06:27 )   PT: 11.6 sec;   INR: 1.11 ratio    PTT - ( 07 Sep 2024 19:41 )  PTT:40.5 sec       D-Dimer Assay, Quantitative (09.09.24 @ 06:27)   D-Dimer Assay, Quantitative: 290    Pro-Brain Natriuretic Peptide: 7847 pg/mL (09.07.24 @ 19:41)     FluA/FluB/RSV/COVID PCR (09.07.24 @ 19:39)   SARS-CoV-2 Result: Detected:  Influenza A Result: NotDete  Influenza B Result: NotDete  Resp Syn Virus Result: [facility]    RADIOLOGY & ADDITIONAL TESTS:  ACC: [id number] EXAM:  XR CHEST PORTABLE URGENT 1V   ORDERED BY:  ALVAREZ LANE     PROCEDURE DATE:  09/07/2024          INTERPRETATION:  EXAMINATION: XR CHEST URGENT    CLINICAL INDICATION: Sepsis    TECHNIQUE: Single frontal portable view of the chest from 9/7/2024 9:55 PM    COMPARISON:  None.    FINDINGS:    The heart size is normal.  Increased haziness to the right lower lung which may be related to   positioning/body habitus. No focal consolidation  There is no pneumothorax or pleural effusion.    IMPRESSION:  No focal consolidation.    --- End of Report ---       HPI:  49-year-old male with past medical history of diabetes, hypertension, HLD. CVA with right sided hemiplegia, AF (now off AC 2/2 hx ICH), non verbal, dysphagia s/p PEG tube (last known exchange at [facility]17/24 [facility] recently treated pneumonia with meropenem (8/27-9/2) brought in by EMS from [facility] for difficulty breathing and hypoxia 85% RA, difficulty breathing per EMS and nursing home documentation.  Patient unable to provide additional history. Currently admitted and undergoing treatment for COVID-19 pneumonia.     s/p RRT called for hypoxia and distress earlier this AM. "Nurse says that earlier in the night, she gave the tube feed bolus and later when she uncapped the tube to give medication, all of the feed that was injected for last bolus came out rapidly all over bed. Around 430 the patient began to groan and cry out loudly. Patient is nonverbal, confirmed DNR/DNI status based on Molst in chart and [facility] note." s/p deep suction by RT with improved O2 saturation, placed on Venti mask,, s/p duonebs and started on Zosyn    GI asked to evaluate    pt uses non verbal communication  Pt pulling off venti mask and pulled out IV during RRT  has new IV; currently on NC for supplemental oxygen  awaiting CT AP      PAST MEDICAL & SURGICAL HISTORY:  CVA (cerebrovascular accident)  HTN (hypertension)  PAF (paroxysmal atrial fibrillation)  Type 2 diabetes mellitus  Stage 3 chronic kidney disease  NSTEMI (non-ST elevation myocardial infarction)  History of pleural effusion  S/P percutaneous endoscopic gastrostomy (PEG) tube placement  AGUSTIN (iron deficiency anemia)  Anxiety and depression  Aphasia  HLD (hyperlipidemia)      Allergies  No Known Allergies      MEDICATIONS  (STANDING):  albuterol    90 MICROgram(s) HFA Inhaler 2 Puff(s) Inhalation every 8 hours  albuterol/ipratropium for Nebulization 3 milliLiter(s) Nebulizer every 6 hours  amLODIPine   Tablet 10 milliGRAM(s) Oral daily  aspirin  chewable 81 milliGRAM(s) Oral daily  atorvastatin 40 milliGRAM(s) Oral at bedtime  dexAMETHasone  Injectable 6 milliGRAM(s) IV Push daily  dextrose 5% + lactated ringers. 1000 milliLiter(s) (60 mL/Hr) IV Continuous <Continuous>  dextrose 5%. 1000 milliLiter(s) (50 mL/Hr) IV Continuous <Continuous>  dextrose 5%. 1000 milliLiter(s) (100 mL/Hr) IV Continuous <Continuous>  dextrose 50% Injectable 12.5 Gram(s) IV Push once  dextrose 50% Injectable 25 Gram(s) IV Push once  dextrose 50% Injectable 25 Gram(s) IV Push once  ferrous    sulfate Liquid 300 milliGRAM(s) Enteral Tube daily  glucagon  Injectable 1 milliGRAM(s) IntraMuscular once  heparin   Injectable 5000 Unit(s) SubCutaneous every 12 hours  hydrALAZINE 25 milliGRAM(s) Oral two times a day  isosorbide   dinitrate Tablet (ISORDIL) 20 milliGRAM(s) Oral three times a day  labetalol 300 milliGRAM(s) Oral two times a day  pancrelipase (VIOKACE) for Occluded enteral feeding tubes 1 Tablet(s) Enteral Tube once  piperacillin/tazobactam IVPB.- 3.375 Gram(s) IV Intermittent once  piperacillin/tazobactam IVPB.- 3.375 Gram(s) IV Intermittent once  piperacillin/tazobactam IVPB.. 3.375 Gram(s) IV Intermittent every 8 hours  QUEtiapine 25 milliGRAM(s) Oral two times a day  remdesivir  IVPB 100 milliGRAM(s) IV Intermittent every 24 hours  remdesivir  IVPB   IV Intermittent   senna 2 Tablet(s) Oral at bedtime  sodium bicarbonate 1300 milliGRAM(s) Oral three times a day    MEDICATIONS  (PRN):  acetaminophen   Oral Liquid .. 650 milliGRAM(s) Oral every 6 hours PRN Temp greater or equal to 38C (100.4F), Moderate Pain (4 - 6)  dextrose Oral Gel 15 Gram(s) Oral once PRN Blood Glucose LESS THAN 70 milliGRAM(s)/deciliter  haloperidol    Injectable 2 milliGRAM(s) IntraMuscular once PRN Agitation      Social History:  SNF resident    Family History   IBD (  ) Yes   (  ) No  GI Malignancy (  )  Yes    (  ) No      Advanced Directives: (     ) None    (   X  ) DNR    ( X    ) DNI    (  X   ) Health Care Proxy: next of kin is sister in [place] ([phone])       Review of Systems:  unable to obtain; see HPI  History from chart review and d/w team      Vital Signs Last 24 Hrs  T(C): 36.8 (09 Sep 2024 05:42), Max: 37.7 (08 Sep 2024 17:07)  T(F): 98.3 (09 Sep 2024 05:42), Max: 99.9 (08 Sep 2024 17:07)  HR: 96 (09 Sep 2024 05:42) (96 - 99)  BP: 155/97 (09 Sep 2024 05:42) (152/79 - 161/94)  BP(mean): --  RR: 18 (09 Sep 2024 05:42) (18 - 18)  SpO2: 93% (09 Sep 2024 05:42) (93% - 100%)    Parameters below as of 09 Sep 2024 05:42  Patient On (Oxygen Delivery Method): nasal cannula  O2 Flow (L/min): 2      PHYSICAL EXAM:    Constitutional: So  male responds to simple questions w/ non verbal responses; anxious. non toxic   Neck: no JVS  Respiratory: +rhonchi, no retractions, no wheeze  Cardiovascular: S1S2 tachy  Gastrointestinal: soft soft, obese NT +BS +G tube (16 Fr balloon bumper) clear yellow tinged residue in tubing.   PEG bumper noted away from abdominal wall at ~6cm pam PEG tubing slides easily within PEG tract bumper synched down to ~4cm pam at skin level - spins easily 360 degrees.  Endo-bush used within PEG lumen, pink tinged residue debris noted and cleared w/ brush. PEG flushed easily with 20-30 cc free water and pull back on PEG with drainage of clear/yellow tinged water/liquid gastric contents.    Extremities: +muscle wasting, no edema +right elisa. s/p Right great toe amputation  Vascular: +pulses bl  Neurological: +aphasia, responds to yes/no questions with non verbal responses +right elisa  Psychiatric: sl anxious    LABS:                        8.5    8.27  )-----------( 245      ( 09 Sep 2024 06:30 )             26.8     09-09    149<H>  |  116<H>  |  41<H>  ----------------------------<  177<H>  4.7   |  21<L>  |  2.81<H>    Ca    8.4      09 Sep 2024 06:28  Phos  4.9     09-09  Mg     2.0     09-09    TPro  6.5  /  Alb  3.5  /  TBili  0.5  /  DBili  0.1  /  AST  17  /  ALT  14  /  AlkPhos  120  09-09    PT/INR - ( 09 Sep 2024 06:27 )   PT: 11.6 sec;   INR: 1.11 ratio    PTT - ( 07 Sep 2024 19:41 )  PTT:40.5 sec       D-Dimer Assay, Quantitative (09.09.24 @ 06:27)   D-Dimer Assay, Quantitative: 290    Pro-Brain Natriuretic Peptide: 7847 pg/mL (09.07.24 @ 19:41)     FluA/FluB/RSV/COVID PCR (09.07.24 @ 19:39)   SARS-CoV-2 Result: Detected:  Influenza A Result: NotDeteDealer Inspire  Influenza B Result: NotDeteDealer Inspire  Resp Syn Virus Result: NotDetec    RADIOLOGY & ADDITIONAL TESTS:  ACC: [id number] EXAM:  XR CHEST PORTABLE URGENT 1V   ORDERED BY:  ALVAREZ LANE   PROCEDURE DATE:  09/07/2024        INTERPRETATION:  EXAMINATION: XR CHEST URGENT    CLINICAL INDICATION: Sepsis    TECHNIQUE: Single frontal portable view of the chest from 9/7/2024 9:55 PM    COMPARISON:  None.    FINDINGS:    The heart size is normal.  Increased haziness to the right lower lung which may be related to   positioning/body habitus. No focal consolidation  There is no pneumothorax or pleural effusion.    IMPRESSION:  No focal consolidation.    --- End of Report ---

## 2024-09-10 LAB
ADD ON TEST-SPECIMEN IN LAB: SIGNIFICANT CHANGE UP
ALBUMIN SERPL ELPH-MCNC: 3.3 G/DL — SIGNIFICANT CHANGE UP (ref 3.3–5)
ALP SERPL-CCNC: 97 U/L — SIGNIFICANT CHANGE UP (ref 40–120)
ALT FLD-CCNC: 12 U/L — SIGNIFICANT CHANGE UP (ref 10–45)
AST SERPL-CCNC: 11 U/L — SIGNIFICANT CHANGE UP (ref 10–40)
BILIRUB DIRECT SERPL-MCNC: 0.2 MG/DL — SIGNIFICANT CHANGE UP (ref 0–0.3)
BILIRUB INDIRECT FLD-MCNC: 0.6 MG/DL — SIGNIFICANT CHANGE UP (ref 0.2–1)
BILIRUB SERPL-MCNC: 0.8 MG/DL — SIGNIFICANT CHANGE UP (ref 0.2–1.2)
CREAT SERPL-MCNC: 2.76 MG/DL — HIGH (ref 0.5–1.3)
D DIMER BLD IA.RAPID-MCNC: 217 NG/ML DDU — SIGNIFICANT CHANGE UP
EGFR: 27 ML/MIN/1.73M2 — LOW
GLUCOSE BLDC GLUCOMTR-MCNC: 163 MG/DL — HIGH (ref 70–99)
GLUCOSE BLDC GLUCOMTR-MCNC: 168 MG/DL — HIGH (ref 70–99)
GLUCOSE BLDC GLUCOMTR-MCNC: 184 MG/DL — HIGH (ref 70–99)
GLUCOSE BLDC GLUCOMTR-MCNC: 209 MG/DL — HIGH (ref 70–99)
GLUCOSE BLDC GLUCOMTR-MCNC: 212 MG/DL — HIGH (ref 70–99)
INR BLD: 1.12 RATIO — SIGNIFICANT CHANGE UP (ref 0.85–1.18)
PROT SERPL-MCNC: 6 G/DL — SIGNIFICANT CHANGE UP (ref 6–8.3)
PROTHROM AB SERPL-ACNC: 11.7 SEC — SIGNIFICANT CHANGE UP (ref 9.5–13)
T4 FREE SERPL-MCNC: 1.6 NG/DL — SIGNIFICANT CHANGE UP (ref 0.9–1.8)
TSH SERPL-MCNC: 2.73 UIU/ML — SIGNIFICANT CHANGE UP (ref 0.27–4.2)

## 2024-09-10 PROCEDURE — 99233 SBSQ HOSP IP/OBS HIGH 50: CPT

## 2024-09-10 PROCEDURE — 99232 SBSQ HOSP IP/OBS MODERATE 35: CPT

## 2024-09-10 RX ORDER — FUROSEMIDE 40 MG
40 TABLET ORAL ONCE
Refills: 0 | Status: COMPLETED | OUTPATIENT
Start: 2024-09-10 | End: 2024-09-10

## 2024-09-10 RX ORDER — FUROSEMIDE 40 MG
20 TABLET ORAL DAILY
Refills: 0 | Status: DISCONTINUED | OUTPATIENT
Start: 2024-09-11 | End: 2024-09-11

## 2024-09-10 RX ORDER — POLYETHYLENE GLYCOL 3350 17 G/17G
17 POWDER, FOR SOLUTION ORAL
Refills: 0 | Status: DISCONTINUED | OUTPATIENT
Start: 2024-09-10 | End: 2024-09-11

## 2024-09-10 RX ORDER — HYDRALAZINE HCL 50 MG
25 TABLET ORAL THREE TIMES A DAY
Refills: 0 | Status: DISCONTINUED | OUTPATIENT
Start: 2024-09-10 | End: 2024-09-18

## 2024-09-10 RX ORDER — PANTOPRAZOLE SODIUM 40 MG
40 TABLET, DELAYED RELEASE (ENTERIC COATED) ORAL
Refills: 0 | Status: DISCONTINUED | OUTPATIENT
Start: 2024-09-10 | End: 2024-09-11

## 2024-09-10 RX ADMIN — SODIUM BICARBONATE 650 MILLIGRAM(S): 84 INJECTION, SOLUTION INTRAVENOUS at 07:22

## 2024-09-10 RX ADMIN — Medication 2 PUFF(S): at 00:29

## 2024-09-10 RX ADMIN — PIPERACILLIN SODIUM AND TAZOBACTAM SODIUM 25 GRAM(S): 3; .375 INJECTION, POWDER, FOR SOLUTION INTRAVENOUS at 04:09

## 2024-09-10 RX ADMIN — Medication 25 MILLIGRAM(S): at 14:39

## 2024-09-10 RX ADMIN — SODIUM BICARBONATE 650 MILLIGRAM(S): 84 INJECTION, SOLUTION INTRAVENOUS at 14:39

## 2024-09-10 RX ADMIN — Medication 2: at 17:24

## 2024-09-10 RX ADMIN — Medication 25 MILLIGRAM(S): at 07:21

## 2024-09-10 RX ADMIN — Medication 5000 UNIT(S): at 06:19

## 2024-09-10 RX ADMIN — REMDESIVIR 200 MILLIGRAM(S): 5 INJECTION INTRAVENOUS at 10:01

## 2024-09-10 RX ADMIN — Medication 2 TABLET(S): at 22:00

## 2024-09-10 RX ADMIN — IPRATROPIUM BROMIDE AND ALBUTEROL SULFATE 3 MILLILITER(S): .5; 3 SOLUTION RESPIRATORY (INHALATION) at 17:25

## 2024-09-10 RX ADMIN — AMLODIPINE BESYLATE 10 MILLIGRAM(S): 10 TABLET ORAL at 07:21

## 2024-09-10 RX ADMIN — Medication 2 PUFF(S): at 21:59

## 2024-09-10 RX ADMIN — Medication 60 MILLILITER(S): at 00:29

## 2024-09-10 RX ADMIN — Medication 25 MILLIGRAM(S): at 22:00

## 2024-09-10 RX ADMIN — POLYETHYLENE GLYCOL 3350 17 GRAM(S): 17 POWDER, FOR SOLUTION ORAL at 10:28

## 2024-09-10 RX ADMIN — Medication 20 MILLIGRAM(S): at 14:39

## 2024-09-10 RX ADMIN — Medication 1: at 12:00

## 2024-09-10 RX ADMIN — PIPERACILLIN SODIUM AND TAZOBACTAM SODIUM 25 GRAM(S): 3; .375 INJECTION, POWDER, FOR SOLUTION INTRAVENOUS at 10:28

## 2024-09-10 RX ADMIN — Medication 20 MILLIGRAM(S): at 22:00

## 2024-09-10 RX ADMIN — Medication 300 MILLIGRAM(S): at 17:24

## 2024-09-10 RX ADMIN — Medication 20 MILLIGRAM(S): at 07:21

## 2024-09-10 RX ADMIN — IPRATROPIUM BROMIDE AND ALBUTEROL SULFATE 3 MILLILITER(S): .5; 3 SOLUTION RESPIRATORY (INHALATION) at 10:43

## 2024-09-10 RX ADMIN — Medication 2 PUFF(S): at 14:39

## 2024-09-10 RX ADMIN — Medication 6 MILLIGRAM(S): at 06:20

## 2024-09-10 RX ADMIN — SODIUM BICARBONATE 650 MILLIGRAM(S): 84 INJECTION, SOLUTION INTRAVENOUS at 21:59

## 2024-09-10 RX ADMIN — Medication 2: at 00:37

## 2024-09-10 RX ADMIN — ACETAMINOPHEN 650 MILLIGRAM(S): 325 TABLET ORAL at 14:44

## 2024-09-10 RX ADMIN — IPRATROPIUM BROMIDE AND ALBUTEROL SULFATE 3 MILLILITER(S): .5; 3 SOLUTION RESPIRATORY (INHALATION) at 00:29

## 2024-09-10 RX ADMIN — Medication 5000 UNIT(S): at 17:24

## 2024-09-10 RX ADMIN — Medication 1: at 06:26

## 2024-09-10 RX ADMIN — Medication 40 MILLIGRAM(S): at 10:02

## 2024-09-10 RX ADMIN — Medication 40 MILLIGRAM(S): at 10:28

## 2024-09-10 RX ADMIN — Medication 2 PUFF(S): at 06:18

## 2024-09-10 RX ADMIN — Medication 300 MILLIGRAM(S): at 07:22

## 2024-09-10 RX ADMIN — Medication 25 MILLIGRAM(S): at 17:24

## 2024-09-10 RX ADMIN — Medication 40 MILLIGRAM(S): at 22:00

## 2024-09-10 RX ADMIN — IPRATROPIUM BROMIDE AND ALBUTEROL SULFATE 3 MILLILITER(S): .5; 3 SOLUTION RESPIRATORY (INHALATION) at 06:18

## 2024-09-10 RX ADMIN — Medication 81 MILLIGRAM(S): at 10:41

## 2024-09-10 NOTE — PROGRESS NOTE ADULT - ASSESSMENT
49-year-old male with past medical history of diabetes, hypertension, HLD. CVA with right sided hemiplegia, non verbal, dysphagia on PEG tube, recently treated pneumonia with meropenem (8/27-9/2) brought in by EMS from [facility] for difficulty breathing and hypoxia 85% RA, difficulty breathing per EMS and nursing home documentation. found to have COVID and QUITA vs advanced CKD. Nephrology consulted for QUITA vs CKD    Suspect QUITA on CKD vs advanced CKD  - Nephrotic syndrome   -QUITA 2/2 prerenal azotemia? vs chronicity   -Hypernatremia     COVID virus  -on RDV    dysphagia  -s/p PEG    CVA  -R sided hemiplegia    RECOMMEND:  -  DC IVF   - bicarb 650 TID through peg  - PEG feeds to be restarted today   -monitor resp status closely and less oxygent   -Dose Rx for CrCl 20-25mL/min;  Creatinine is improving    - Issue is the nephrotic syndrome.  GIven overall state I suspect ARB + SGLT2 would be warranted here       Sayed [facility]   [phone]        49-year-old male with past medical history of diabetes, hypertension, HLD. CVA with right sided hemiplegia, non verbal, dysphagia on PEG tube, recently treated pneumonia with meropenem (8/27-9/2) brought in by EMS from [facility] for difficulty breathing and hypoxia 85% RA, difficulty breathing per EMS and nursing home documentation. found to have COVID and QUITA vs advanced CKD. Nephrology consulted for QUITA vs CKD    Suspect QUITA on CKD vs advanced CKD  - Nephrotic syndrome   -QUITA 2/2 prerenal azotemia? vs chronicity   -Hypernatremia     COVID virus  -on RDV    dysphagia  -s/p PEG    CVA  -R sided hemiplegia    RECOMMEND:  -  DC IVF ;  I suspect lasix can be now via peg and PO   - bicarb 650 TID through peg  - PEG feeds to be restarted today   -monitor resp status closely and less oxygent   -Dose Rx for CrCl 20-25mL/min;  Creatinine is improving    - Issue is the nephrotic syndrome.  GIven overall state I suspect ARB + SGLT2 would be warranted here       Sayed [facility]   [phone]

## 2024-09-10 NOTE — PROGRESS NOTE ADULT - SUBJECTIVE AND OBJECTIVE BOX
NEPHROLOGY-[facility] [phone]        Patient seen and examined in bed.  He was the same but more alert and less oxygen         MEDICATIONS  (STANDING):  albuterol    90 MICROgram(s) HFA Inhaler 2 Puff(s) Inhalation every 8 hours  albuterol/ipratropium for Nebulization 3 milliLiter(s) Nebulizer every 6 hours  amLODIPine   Tablet 10 milliGRAM(s) Oral daily  aspirin  chewable 81 milliGRAM(s) Oral daily  atorvastatin 40 milliGRAM(s) Oral at bedtime  dexAMETHasone  Injectable 6 milliGRAM(s) IV Push daily  dextrose 5%. 1000 milliLiter(s) (50 mL/Hr) IV Continuous <Continuous>  dextrose 5%. 1000 milliLiter(s) (100 mL/Hr) IV Continuous <Continuous>  dextrose 50% Injectable 25 Gram(s) IV Push once  dextrose 50% Injectable 25 Gram(s) IV Push once  dextrose 50% Injectable 12.5 Gram(s) IV Push once  furosemide   Injectable 40 milliGRAM(s) IV Push once  glucagon  Injectable 1 milliGRAM(s) IntraMuscular once  heparin   Injectable 5000 Unit(s) SubCutaneous every 12 hours  hydrALAZINE 25 milliGRAM(s) Oral three times a day  insulin lispro (ADMELOG) corrective regimen sliding scale   SubCutaneous every 6 hours  isosorbide   dinitrate Tablet (ISORDIL) 20 milliGRAM(s) Oral three times a day  labetalol 300 milliGRAM(s) Oral two times a day  pancrelipase (VIOKACE) for Occluded enteral feeding tubes 1 Tablet(s) Enteral Tube once  pantoprazole    Tablet 40 milliGRAM(s) Oral before breakfast  piperacillin/tazobactam IVPB.. 3.375 Gram(s) IV Intermittent every 8 hours  polyethylene glycol 3350 17 Gram(s) Oral two times a day  QUEtiapine 25 milliGRAM(s) Oral two times a day  remdesivir  IVPB   IV Intermittent   remdesivir  IVPB 100 milliGRAM(s) IV Intermittent every 24 hours  senna 2 Tablet(s) Oral at bedtime  sodium bicarbonate 650 milliGRAM(s) Oral three times a day      VITAL:  T(C): , Max: 37 (09-10-24 @ 06:30)  T(F): , Max: 98.6 (09-10-24 @ 06:30)  HR: 86 (09-10-24 @ 08:40)  BP: 152/84 (09-10-24 @ 08:40)  BP(mean): --  RR: 18 (09-10-24 @ 08:40)  SpO2: 97% (09-10-24 @ 08:40)  Wt(kg): --    I and O's:    09-09 @ 07:01  -  09-10 @ 07:00  --------------------------------------------------------  IN: 200 mL / OUT: 0 mL / NET: 200 mL    09-10 @ 07:01  -  09-10 @ 09:38  --------------------------------------------------------  IN: 0 mL / OUT: 0 mL / NET: 0 mL          PHYSICAL EXAM:    Constitutional: cachetic   Neck:  No JVD  Respiratory: CTAB/L  Cardiovascular: S1 and S2  Gastrointestinal: BS+, soft, + peg   Extremities: No peripheral edema  Neurological: right sided weakness   Psychiatric:    : No [place]  Skin: No rashes  Access: Not applicable    LABS:                        8.5    8.27  )-----------( 245      ( 09 Sep 2024 06:30 )             26.8     09-10    x   |  x   |  x   ----------------------------<  x   x    |  x   |  2.76<H>    Ca    8.4      09 Sep 2024 06:28  Phos  4.9     09-09  Mg     2.0     09-09    TPro  6.0  /  Alb  3.3  /  TBili  0.8  /  DBili  0.2  /  AST  11  /  ALT  12  /  AlkPhos  97  09-10          Urine Studies:  Urinalysis Basic - ( 09 Sep 2024 06:28 )    Color: x / Appearance: x / SG: x / pH: x  Gluc: 177 mg/dL / Ketone: x  / Bili: x / Urobili: x   Blood: x / Protein: x / Nitrite: x   Leuk Esterase: x / RBC: x / WBC x   Sq Epi: x / Non Sq Epi: x / Bacteria: x      Sodium, Random Urine: 95 mmol/L (09-08 @ 20:42)  Creatinine, Random Urine: 85 mg/dL (09-08 @ 20:42)  Protein/Creatinine Ratio Calculation: 5.5 Ratio (09-08 @ 20:42)        RADIOLOGY & ADDITIONAL STUDIES:

## 2024-09-10 NOTE — PROGRESS NOTE ADULT - SUBJECTIVE AND OBJECTIVE BOX
Date of Service: 09-10-24 @ 07:11           CARDIOLOGY     PROGRESS  NOTE   ________________________________________________    CHIEF COMPLAINT:Patient is a 49y old  Male who presents with a chief complaint of SOB (09 Sep 2024 13:34)  still with mold sob, comfortable  	  REVIEW OF SYSTEMS:  CONSTITUTIONAL: No fever, weight loss, or fatigue  EYES: No eye pain, visual disturbances, or discharge  ENT:  No difficulty hearing, tinnitus, vertigo; No sinus or throat pain  NECK: No pain or stiffness  RESPIRATORY: No cough, wheezing, chills or hemoptysis; + Shortness of Breath  CARDIOVASCULAR: No chest pain, palpitations, passing out, dizziness, or leg swelling  GASTROINTESTINAL: No abdominal or epigastric pain. No nausea, vomiting, or hematemesis; No diarrhea or constipation. No melena or hematochezia.  GENITOURINARY: No dysuria, frequency, hematuria, or incontinence  NEUROLOGICAL: No headaches, memory loss, loss of strength, numbness, or tremors  SKIN: No itching, burning, rashes, or lesions   LYMPH Nodes: No enlarged glands  ENDOCRINE: No heat or cold intolerance; No hair loss  MUSCULOSKELETAL: No joint pain or swelling; No muscle, back, or extremity pain  PSYCHIATRIC: No depression, anxiety, mood swings, or difficulty sleeping  HEME/LYMPH: No easy bruising, or bleeding gums  ALLERGY AND IMMUNOLOGIC: No hives or eczema	    [ ] All others negative	  [x ] Unable to obtain    PHYSICAL EXAM:  T(C): 37 (09-10-24 @ 06:30), Max: 37 (09-10-24 @ 06:30)  HR: 82 (09-10-24 @ 06:30) (82 - 94)  BP: 170/88 (09-10-24 @ 06:30) (155/81 - 170/93)  RR: 18 (09-10-24 @ 06:30) (17 - 18)  SpO2: 100% (09-10-24 @ 06:30) (93% - 100%)  Wt(kg): --  I&O's Summary    09 Sep 2024 07:01  -  10 Sep 2024 07:00  --------------------------------------------------------  IN: 200 mL / OUT: 0 mL / NET: 200 mL        Appearance: Normal	  HEENT:   Normal oral mucosa, PERRL, EOMI	  Lymphatic: No lymphadenopathy  Cardiovascular: Normal S1 S2, No JVD, + murmurs, No edema  Respiratory:rhonchi  Gastrointestinal:  Soft, Non-tender, + BS, peg	  Skin: No rashes, No ecchymoses, No cyanosis	  Neurologic: Non-focal  Extremities: Normal range of motion, No clubbing, cyanosis or edema  Vascular: Peripheral pulses palpable 2+ bilaterally    MEDICATIONS  (STANDING):  albuterol    90 MICROgram(s) HFA Inhaler 2 Puff(s) Inhalation every 8 hours  albuterol/ipratropium for Nebulization 3 milliLiter(s) Nebulizer every 6 hours  amLODIPine   Tablet 10 milliGRAM(s) Oral daily  aspirin  chewable 81 milliGRAM(s) Oral daily  atorvastatin 40 milliGRAM(s) Oral at bedtime  dexAMETHasone  Injectable 6 milliGRAM(s) IV Push daily  dextrose 5%. 1000 milliLiter(s) (50 mL/Hr) IV Continuous <Continuous>  dextrose 5%. 1000 milliLiter(s) (60 mL/Hr) IV Continuous <Continuous>  dextrose 5%. 1000 milliLiter(s) (100 mL/Hr) IV Continuous <Continuous>  dextrose 50% Injectable 12.5 Gram(s) IV Push once  dextrose 50% Injectable 25 Gram(s) IV Push once  dextrose 50% Injectable 25 Gram(s) IV Push once  ferrous    sulfate Liquid 300 milliGRAM(s) Enteral Tube daily  glucagon  Injectable 1 milliGRAM(s) IntraMuscular once  heparin   Injectable 5000 Unit(s) SubCutaneous every 12 hours  hydrALAZINE 25 milliGRAM(s) Oral two times a day  insulin lispro (ADMELOG) corrective regimen sliding scale   SubCutaneous every 6 hours  isosorbide   dinitrate Tablet (ISORDIL) 20 milliGRAM(s) Oral three times a day  labetalol 300 milliGRAM(s) Oral two times a day  pancrelipase (VIOKACE) for Occluded enteral feeding tubes 1 Tablet(s) Enteral Tube once  pantoprazole  Injectable 40 milliGRAM(s) IV Push daily  piperacillin/tazobactam IVPB.. 3.375 Gram(s) IV Intermittent every 8 hours  QUEtiapine 25 milliGRAM(s) Oral two times a day  remdesivir  IVPB 100 milliGRAM(s) IV Intermittent every 24 hours  remdesivir  IVPB   IV Intermittent   senna 2 Tablet(s) Oral at bedtime  sodium bicarbonate 650 milliGRAM(s) Oral three times a day      TELEMETRY: 	    ECG:  	  RADIOLOGY:  OTHER: 	  	  LABS:	 	    CARDIAC MARKERS:                          8.5    8.27  )-----------( 245      ( 09 Sep 2024 06:30 )             26.8     09-09    149<H>  |  116<H>  |  41<H>  ----------------------------<  177<H>  4.7   |  21<L>  |  2.81<H>    Ca    8.4      09 Sep 2024 06:28  Phos  4.9     09-09  Mg     2.0     09-09    TPro  6.5  /  Alb  3.5  /  TBili  0.5  /  DBili  0.1  /  AST  17  /  ALT  14  /  AlkPhos  120  09-09    proBNP:   Lipid Profile:   HgA1c:   TSH:   PT/INR - ( 09 Sep 2024 06:27 )   PT: 11.6 sec;   INR: 1.11 ratio       -Change IVF to D5W   @ 60   -Reduce bicarb 650 TID through peg  - PEG feeds when able;  At present NPO  -monitor resp status closely,    -Dose Rx for CrCl 20-25mL/min;  Creatinine is improving     D-Dimer Assay, Quantitative (09.09.24 @ 06:27)    D-Dimer Assay, Quantitative: 290: "D-Dimer result less than or equal to 229ng/mL DDU correlates with the  absence of thrombosis in a patient with a low and moderate pre-test  probability of thrombosis. 1DDU is approximately equal to 2ng/mL FEU  (previous unit)" ng/mL DDU      < from: CT Chest No Cont (09.09.24 @ 17:49) >  Limited evaluation secondary to motion artifact.  Bilateral central predominant groundglass opacities. Moderate to large   right pleural effusion with partial atelectasis of the right lower lobe.   Small left pleural effusion.  No acute intra-abdominal pathology.      Assessment and plan  ---------------------------  49-year-old male with past medical history of diabetes, hypertension, HLD. CVA with right sided hemiplegia, non verbal, dysphagia on PEG tube, recently treated pneumonia with meropenem (8/27-9/2) brought in by EMS from [facility] [place] for difficulty breathing and hypoxia 85% RA, difficulty breathing per EMS and nursing home documentation.  Patient unable to provide additional history.     pt with sig medical and cardiac hx with increasing sob, COVID +, recently treated with pneumoniae  check d dimer if elevated , vq scan to r/o PE  echo  ID eval RDV increase creatinine renal eval  pt with hx of PAF ?AC will discus with Dr Prasad  CAD/ ASHD continue cardiac meds will adjust  increase bp will adjust meds, may increase hydralazine dose  ID noted   CKD continue to observe , on sodium bicarb  renal ultrasound  dvt prophylaxis, check d dimer noted  awaiting echo  chest ct with moderate large pleural effusion, awaiting echo, may need therapeutic and diagnostic thoracentesis  no diuretics sec to hypernatremia  peg feeding as per medicine  may consider to dc ivf, free water via the peg

## 2024-09-10 NOTE — PROGRESS NOTE ADULT - ASSESSMENT
49-year-old male h/o  HTN/ HLD. DM ,   CVA with right sided hemiplegia, non verbal, dysphagia ,has  PEG tube, recently treated pneumonia brought in by EMS from [facility] for difficulty breathing and hypoxia   per   nursing home , pt has   covid   QUITA    Assessment  Hyperglycemia: 49y Male with hyperglycemias, being started on tube feeds via PEG tube, A1c stable, not on DM meds, started on sliding scale now.   COVID+: on medications, on isolation , monitored.  HTN: on antihypertensive medications, monitored, asymptomatic.    Discussed plan and management wit Dr Mau León MD  Cell: [phone]  Office: [phone]               49-year-old male h/o  HTN/ HLD. DM ,   CVA with right sided hemiplegia, non verbal, dysphagia ,has  PEG tube, recently treated pneumonia brought in by EMS from [facility] for difficulty breathing and hypoxia   per   nursing home , pt has   covid   QUITA      Assessment  Hyperglycemia: 49y Male with hyperglycemias, being started on tube feeds via PEG tube, A1c stable, not on DM meds, started on sliding scale now.   COVID+: on medications, on isolation , monitored.  HTN: on antihypertensive medications, monitored, asymptomatic.    Discussed plan and management wit Dr Mau León MD  Cell: [phone]  Office: [phone]

## 2024-09-10 NOTE — PROGRESS NOTE ADULT - SUBJECTIVE AND OBJECTIVE BOX
Chief complaint  Patient is a 49y old  Male who presents with a chief complaint of SOB (10 Sep 2024 11:28)         Labs and Fingersticks  CAPILLARY BLOOD GLUCOSE      POCT Blood Glucose.: 184 mg/dL (10 Sep 2024 11:51)  POCT Blood Glucose.: 168 mg/dL (10 Sep 2024 08:40)  POCT Blood Glucose.: 163 mg/dL (10 Sep 2024 06:23)  POCT Blood Glucose.: 209 mg/dL (10 Sep 2024 00:18)  POCT Blood Glucose.: 228 mg/dL (09 Sep 2024 21:52)  POCT Blood Glucose.: 200 mg/dL (09 Sep 2024 18:11)      Anion Gap: 12 (09-09 @ 06:28)      Calcium: 8.4 (09-09 @ 06:28)  Intact PTH: 144 *H* (09-08 @ 16:15)  Albumin: 3.3 (09-10 @ 07:08)  Albumin: 3.5 (09-09 @ 06:28)    Alanine Aminotransferase (ALT/SGPT): 12 (09-10 @ 07:08)  Alanine Aminotransferase (ALT/SGPT): 14 (09-09 @ 06:28)  Alkaline Phosphatase: 97 (09-10 @ 07:08)  Alkaline Phosphatase: 120 (09-09 @ 06:28)  Aspartate Aminotransferase (AST/SGOT): 11 (09-10 @ 07:08)  Aspartate Aminotransferase (AST/SGOT): 17 (09-09 @ 06:28)        09-10    x   |  x   |  x   ----------------------------<  x   x    |  x   |  2.76<H>    Ca    8.4      09 Sep 2024 06:28  Phos  4.9     09-09  Mg     2.0     09-09    TPro  6.0  /  Alb  3.3  /  TBili  0.8  /  DBili  0.2  /  AST  11  /  ALT  12  /  AlkPhos  97  09-10                        8.5    8.27  )-----------( 245      ( 09 Sep 2024 06:30 )             26.8     Medications  MEDICATIONS  (STANDING):  albuterol    90 MICROgram(s) HFA Inhaler 2 Puff(s) Inhalation every 8 hours  albuterol/ipratropium for Nebulization 3 milliLiter(s) Nebulizer every 6 hours  amLODIPine   Tablet 10 milliGRAM(s) Oral daily  aspirin  chewable 81 milliGRAM(s) Oral daily  atorvastatin 40 milliGRAM(s) Oral at bedtime  dexAMETHasone  Injectable 6 milliGRAM(s) IV Push daily  dextrose 5%. 1000 milliLiter(s) (50 mL/Hr) IV Continuous <Continuous>  dextrose 5%. 1000 milliLiter(s) (100 mL/Hr) IV Continuous <Continuous>  dextrose 50% Injectable 12.5 Gram(s) IV Push once  dextrose 50% Injectable 25 Gram(s) IV Push once  dextrose 50% Injectable 25 Gram(s) IV Push once  glucagon  Injectable 1 milliGRAM(s) IntraMuscular once  heparin   Injectable 5000 Unit(s) SubCutaneous every 12 hours  hydrALAZINE 25 milliGRAM(s) Oral three times a day  insulin lispro (ADMELOG) corrective regimen sliding scale   SubCutaneous every 6 hours  isosorbide   dinitrate Tablet (ISORDIL) 20 milliGRAM(s) Oral three times a day  labetalol 300 milliGRAM(s) Oral two times a day  pancrelipase (VIOKACE) for Occluded enteral feeding tubes 1 Tablet(s) Enteral Tube once  pantoprazole    Tablet 40 milliGRAM(s) Oral before breakfast  polyethylene glycol 3350 17 Gram(s) Oral two times a day  QUEtiapine 25 milliGRAM(s) Oral two times a day  remdesivir  IVPB 100 milliGRAM(s) IV Intermittent every 24 hours  remdesivir  IVPB   IV Intermittent   senna 2 Tablet(s) Oral at bedtime  sodium bicarbonate 650 milliGRAM(s) Oral three times a day      Physical Exam  General: Patient comfortable in bed   Vital Signs Last 12 Hrs  T(F): 98.8 (09-10-24 @ 13:03), Max: 98.8 (09-10-24 @ 13:03)  HR: 97 (09-10-24 @ 13:03) (82 - 97)  BP: 145/77 (09-10-24 @ 13:03) (145/77 - 170/88)  BP(mean): --  RR: 18 (09-10-24 @ 13:03) (18 - 18)  SpO2: 91% (09-10-24 @ 13:03) (91% - 100%)    CVS: S1S2   Respiratory: No wheezing, no crepitations  GI: Abdomen soft, bowel sounds positive  Musculoskeletal:  moves all extremities  : Voiding      Chief complaint    Patient is a 49y old  Male who presents with a chief complaint of SOB (10 Sep 2024 11:28)         Labs and Fingersticks  CAPILLARY BLOOD GLUCOSE      POCT Blood Glucose.: 184 mg/dL (10 Sep 2024 11:51)  POCT Blood Glucose.: 168 mg/dL (10 Sep 2024 08:40)  POCT Blood Glucose.: 163 mg/dL (10 Sep 2024 06:23)  POCT Blood Glucose.: 209 mg/dL (10 Sep 2024 00:18)  POCT Blood Glucose.: 228 mg/dL (09 Sep 2024 21:52)  POCT Blood Glucose.: 200 mg/dL (09 Sep 2024 18:11)      Anion Gap: 12 (09-09 @ 06:28)      Calcium: 8.4 (09-09 @ 06:28)  Intact PTH: 144 *H* (09-08 @ 16:15)  Albumin: 3.3 (09-10 @ 07:08)  Albumin: 3.5 (09-09 @ 06:28)    Alanine Aminotransferase (ALT/SGPT): 12 (09-10 @ 07:08)  Alanine Aminotransferase (ALT/SGPT): 14 (09-09 @ 06:28)  Alkaline Phosphatase: 97 (09-10 @ 07:08)  Alkaline Phosphatase: 120 (09-09 @ 06:28)  Aspartate Aminotransferase (AST/SGOT): 11 (09-10 @ 07:08)  Aspartate Aminotransferase (AST/SGOT): 17 (09-09 @ 06:28)        09-10    x   |  x   |  x   ----------------------------<  x   x    |  x   |  2.76<H>    Ca    8.4      09 Sep 2024 06:28  Phos  4.9     09-09  Mg     2.0     09-09    TPro  6.0  /  Alb  3.3  /  TBili  0.8  /  DBili  0.2  /  AST  11  /  ALT  12  /  AlkPhos  97  09-10                        8.5    8.27  )-----------( 245      ( 09 Sep 2024 06:30 )             26.8     Medications  MEDICATIONS  (STANDING):  albuterol    90 MICROgram(s) HFA Inhaler 2 Puff(s) Inhalation every 8 hours  albuterol/ipratropium for Nebulization 3 milliLiter(s) Nebulizer every 6 hours  amLODIPine   Tablet 10 milliGRAM(s) Oral daily  aspirin  chewable 81 milliGRAM(s) Oral daily  atorvastatin 40 milliGRAM(s) Oral at bedtime  dexAMETHasone  Injectable 6 milliGRAM(s) IV Push daily  dextrose 5%. 1000 milliLiter(s) (50 mL/Hr) IV Continuous <Continuous>  dextrose 5%. 1000 milliLiter(s) (100 mL/Hr) IV Continuous <Continuous>  dextrose 50% Injectable 12.5 Gram(s) IV Push once  dextrose 50% Injectable 25 Gram(s) IV Push once  dextrose 50% Injectable 25 Gram(s) IV Push once  glucagon  Injectable 1 milliGRAM(s) IntraMuscular once  heparin   Injectable 5000 Unit(s) SubCutaneous every 12 hours  hydrALAZINE 25 milliGRAM(s) Oral three times a day  insulin lispro (ADMELOG) corrective regimen sliding scale   SubCutaneous every 6 hours  isosorbide   dinitrate Tablet (ISORDIL) 20 milliGRAM(s) Oral three times a day  labetalol 300 milliGRAM(s) Oral two times a day  pancrelipase (VIOKACE) for Occluded enteral feeding tubes 1 Tablet(s) Enteral Tube once  pantoprazole    Tablet 40 milliGRAM(s) Oral before breakfast  polyethylene glycol 3350 17 Gram(s) Oral two times a day  QUEtiapine 25 milliGRAM(s) Oral two times a day  remdesivir  IVPB 100 milliGRAM(s) IV Intermittent every 24 hours  remdesivir  IVPB   IV Intermittent   senna 2 Tablet(s) Oral at bedtime  sodium bicarbonate 650 milliGRAM(s) Oral three times a day      Physical Exam  General: Patient comfortable in bed   Vital Signs Last 12 Hrs  T(F): 98.8 (09-10-24 @ 13:03), Max: 98.8 (09-10-24 @ 13:03)  HR: 97 (09-10-24 @ 13:03) (82 - 97)  BP: 145/77 (09-10-24 @ 13:03) (145/77 - 170/88)  BP(mean): --  RR: 18 (09-10-24 @ 13:03) (18 - 18)  SpO2: 91% (09-10-24 @ 13:03) (91% - 100%)    CVS: S1S2   Respiratory: No wheezing, no crepitations  GI: Abdomen soft, bowel sounds positive  Musculoskeletal:  moves all extremities  : Voiding

## 2024-09-10 NOTE — PATIENT PROFILE ADULT - NSPROPOAURINARYCATHETER_GEN_A_NUR
Chief Complaint   Patient presents with    Surgical Followup     1 week S/P septoplasty, nose, with turbinoplasty; bilateral maxillary antrostomy DOS: 7/18/24      PCP: Iván Delgado     Referring Provider: No ref. provider found    There were no vitals taken for this visit.    ENT Problem List:  Patient Active Problem List   Diagnosis    Attention deficit hyperactivity disorder (ADHD)    Generalized anxiety disorder    Mild major depression (H)    GERD (gastroesophageal reflux disease)    Moderate persistent asthma    Morbid obesity (H)    Nasal turbinate hypertrophy    Nasal septal deviation      Current Medications:  Current Outpatient Medications   Medication Sig Dispense Refill    amoxicillin-clavulanate (AUGMENTIN) 875-125 MG tablet Take 1 tablet by mouth 2 times daily for 7 days 14 tablet 0    azelastine (ASTELIN) 0.1 % nasal spray Spray 1 spray into both nostrils 2 times daily 30 mL 1    escitalopram (LEXAPRO) 20 MG tablet Take 1 tablet (20 mg) by mouth daily -due for recheck in clinic 90 tablet 3    fluticasone (FLONASE) 50 MCG/ACT nasal spray Spray 1 spray into both nostrils daily 16 g 1    omeprazole (PRILOSEC) 20 MG DR capsule Take 1 capsule (20 mg) by mouth daily Take in AM 30-60 minutes before eating. 30 capsule 1    oxymetazoline (AFRIN) 0.05 % nasal spray Spray 0.2 mLs (2 sprays) into both nostrils 2 times daily 22 mL 0    TYLENOL OR        No current facility-administered medications for this visit.     HPI  Pleasant 28 year old male presents today as a(n) established patient for follow-up. He is s/p septoplasty, nose, with turbinoplasty; bilateral maxillary antrostomy DOS: 7/18/24. He denies any drainage, difficulty breathing. He has no new concerns to report since his previous visit. He has plans to attend a birthday part this weekend. He is ready to return to work.    Review of Systems   Constitutional: Negative.    HENT:  Negative for ear discharge.    Eyes: Negative.    Respiratory:  Negative.     Gastrointestinal: Negative.    Skin: Negative.    Neurological: Negative.    Endo/Heme/Allergies: Negative.    Psychiatric/Behavioral: Negative.     All other systems reviewed and are negative.      Physical Exam  Vitals and nursing note reviewed.   Constitutional:       Appearance: Normal appearance.   HENT:      Head: Normocephalic and atraumatic.      Right Ear: Ear canal and external ear normal.      Left Ear: Ear canal and external ear normal.      Nose: No septal deviation.   Eyes:      Extraocular Movements: Extraocular movements intact.      Pupils: Pupils are equal, round, and reactive to light.   Neurological:      Mental Status: He is alert.   Psychiatric:         Behavior: Behavior is cooperative.     + no signs of infection  + turbinates are shrinking    A/P  This pleasant patient appears to be healing well s/p septoplasty, nose, with turbinoplasty; bilateral maxillary antrostomy. The turbinates are shrinking He has been cleared from most work-related and activity restrictions, but has been cautioned to use discretion whenever lifting objects more than 20 lbs. He has been encouraged to reach out if there are any concerns before his next visit.     Follow up in clinic in 3-months.    Scribe/Staff:    Scribe Disclosure:   I, Marcella Garcia, am serving as a scribe; to document services personally performed by Bob You MD based on data collection and the provider's statements to me.     Provider Disclosure:  I agree with above History, Review of Systems, Physical exam and Plan.  I have reviewed the content of the documentation and have edited it as needed. I have personally performed the services documented here and the documentation accurately represents those services and the decisions I have made.      Electronically signed by:  Bob You MD        no

## 2024-09-10 NOTE — PROGRESS NOTE ADULT - SUBJECTIVE AND OBJECTIVE BOX
**** INCOMPLETE NOTE ****      INTERVAL HPI/OVERNIGHT EVENTS:    PEG used for meds; no issues reported  last BM 9/8    MEDICATIONS  (STANDING):  albuterol    90 MICROgram(s) HFA Inhaler 2 Puff(s) Inhalation every 8 hours  albuterol/ipratropium for Nebulization 3 milliLiter(s) Nebulizer every 6 hours  amLODIPine   Tablet 10 milliGRAM(s) Oral daily  aspirin  chewable 81 milliGRAM(s) Oral daily  atorvastatin 40 milliGRAM(s) Oral at bedtime  dexAMETHasone  Injectable 6 milliGRAM(s) IV Push daily  dextrose 5%. 1000 milliLiter(s) (50 mL/Hr) IV Continuous <Continuous>  dextrose 5%. 1000 milliLiter(s) (60 mL/Hr) IV Continuous <Continuous>  dextrose 5%. 1000 milliLiter(s) (100 mL/Hr) IV Continuous <Continuous>  dextrose 50% Injectable 12.5 Gram(s) IV Push once  dextrose 50% Injectable 25 Gram(s) IV Push once  dextrose 50% Injectable 25 Gram(s) IV Push once  glucagon  Injectable 1 milliGRAM(s) IntraMuscular once  heparin   Injectable 5000 Unit(s) SubCutaneous every 12 hours  hydrALAZINE 25 milliGRAM(s) Oral three times a day  insulin lispro (ADMELOG) corrective regimen sliding scale   SubCutaneous every 6 hours  isosorbide   dinitrate Tablet (ISORDIL) 20 milliGRAM(s) Oral three times a day  labetalol 300 milliGRAM(s) Oral two times a day  pancrelipase (VIOKACE) for Occluded enteral feeding tubes 1 Tablet(s) Enteral Tube once  pantoprazole    Tablet 40 milliGRAM(s) Oral before breakfast  piperacillin/tazobactam IVPB.. 3.375 Gram(s) IV Intermittent every 8 hours  QUEtiapine 25 milliGRAM(s) Oral two times a day  remdesivir  IVPB   IV Intermittent   remdesivir  IVPB 100 milliGRAM(s) IV Intermittent every 24 hours  senna 2 Tablet(s) Oral at bedtime  sodium bicarbonate 650 milliGRAM(s) Oral three times a day    MEDICATIONS  (PRN):  acetaminophen   Oral Liquid .. 650 milliGRAM(s) Oral every 6 hours PRN Temp greater or equal to 38C (100.4F), Moderate Pain (4 - 6)  dextrose Oral Gel 15 Gram(s) Oral once PRN Blood Glucose LESS THAN 70 milliGRAM(s)/deciliter  haloperidol    Injectable 2 milliGRAM(s) IntraMuscular once PRN Agitation      Allergies  No Known Allergies      Review of Systems:  limited, see HPI    Vital Signs Last 24 Hrs  T(C): 36.8 (10 Sep 2024 08:40), Max: 37 (10 Sep 2024 06:30)  T(F): 98.2 (10 Sep 2024 08:40), Max: 98.6 (10 Sep 2024 06:30)  HR: 86 (10 Sep 2024 08:40) (82 - 94)  BP: 152/84 (10 Sep 2024 08:40) (152/84 - 170/93)  BP(mean): --  RR: 18 (10 Sep 2024 08:40) (17 - 18)  SpO2: 97% (10 Sep 2024 08:40) (93% - 100%)    Parameters below as of 10 Sep 2024 08:40  Patient On (Oxygen Delivery Method): nasal cannula  O2 Flow (L/min): 2      PHYSICAL EXAM:      LABS:                        8.5    8.27  )-----------( 245      ( 09 Sep 2024 06:30 )             26.8     09-10    x   |  x   |  x   ----------------------------<  x   x    |  x   |  2.76<H>    Ca    8.4      09 Sep 2024 06:28  Phos  4.9     09-09  Mg     2.0     09-09    TPro  6.0  /  Alb  3.3  /  TBili  0.8  /  DBili  0.2  /  AST  11  /  ALT  12  /  AlkPhos  97  09-10    PT/INR - ( 10 Sep 2024 07:12 )   PT: 11.7 sec;   INR: 1.12 ratio               RADIOLOGY & ADDITIONAL TESTS:    ACC: [id number] EXAM:  CT ABDOMEN AND PELVIS   ORDERED BY: REY TAPIA     ACC: [id number] EXAM:  CT CHEST   ORDERED BY:  VALENTE PACKER     PROCEDURE DATE:  09/09/2024          INTERPRETATION:  CLINICAL INFORMATION: New Covid. Episode of desaturation   and distress this morning. Recent hospitalization for pneumonia.    COMPARISON: None.    CONTRAST/COMPLICATIONS:  IV Contrast: NONE  Oral Contrast: NONE  Complications: None reported at time of study completion    PROCEDURE:  CT of the Chest, Abdomen and Pelvis was performed.  Sagittal and coronal reformats were performed.    FINDINGS:  CHEST:  LUNGS AND LARGE AIRWAYS: Patent central airways. No pulmonary nodules.   Mild central predominant groundglass opacity. Passive atelectasis at the   lung bases, right greater than left.  PLEURA: Moderate right pleural effusion. Small left pleural effusion.  VESSELS: Within normal limits.  HEART: Heart size is normal. No pericardial effusion.  MEDIASTINUM AND ANNIE: No lymphadenopathy.  CHEST WALL ANDLOWER NECK: Within normal limits.    ABDOMEN AND PELVIS:  LIVER: Within normal limits.  BILE DUCTS: Normal caliber.  GALLBLADDER: Within normal limits.  SPLEEN: Within normal limits.  PANCREAS: Within normal limits.  ADRENALS: Within normal limits.  KIDNEYS/URETERS: Within normal limits. No hydronephrosis.    BLADDER: Within normal limits.  REPRODUCTIVE ORGANS:    BOWEL: No bowel obstruction. Appendix is normal. Gastrostomy tube in the   stomach.  PERITONEUM/RETROPERITONEUM: Within normal limits.  VESSELS: Atherosclerotic calcifications.  LYMPH NODES: No lymphadenopathy.  ABDOMINAL WALL: Mild subcutaneous edema.  BONES: Degenerative changes.    IMPRESSION:  Mild bilateral groundglass opacity with right greater than left pleural   effusions.    Mild subcutaneous edema.    No evidence of acute abnormality in the abdomen and pelvis.      --- End of Report ---       INTERVAL HPI/OVERNIGHT EVENTS:    PEG used for meds; no issues reported  last BM 9/8  no fever or chills  breathing well on NC 2LPM      MEDICATIONS  (STANDING):  albuterol    90 MICROgram(s) HFA Inhaler 2 Puff(s) Inhalation every 8 hours  albuterol/ipratropium for Nebulization 3 milliLiter(s) Nebulizer every 6 hours  amLODIPine   Tablet 10 milliGRAM(s) Oral daily  aspirin  chewable 81 milliGRAM(s) Oral daily  atorvastatin 40 milliGRAM(s) Oral at bedtime  dexAMETHasone  Injectable 6 milliGRAM(s) IV Push daily  dextrose 5%. 1000 milliLiter(s) (50 mL/Hr) IV Continuous <Continuous>  dextrose 5%. 1000 milliLiter(s) (60 mL/Hr) IV Continuous <Continuous>  dextrose 5%. 1000 milliLiter(s) (100 mL/Hr) IV Continuous <Continuous>  dextrose 50% Injectable 12.5 Gram(s) IV Push once  dextrose 50% Injectable 25 Gram(s) IV Push once  dextrose 50% Injectable 25 Gram(s) IV Push once  glucagon  Injectable 1 milliGRAM(s) IntraMuscular once  heparin   Injectable 5000 Unit(s) SubCutaneous every 12 hours  hydrALAZINE 25 milliGRAM(s) Oral three times a day  insulin lispro (ADMELOG) corrective regimen sliding scale   SubCutaneous every 6 hours  isosorbide   dinitrate Tablet (ISORDIL) 20 milliGRAM(s) Oral three times a day  labetalol 300 milliGRAM(s) Oral two times a day  pancrelipase (VIOKACE) for Occluded enteral feeding tubes 1 Tablet(s) Enteral Tube once  pantoprazole    Tablet 40 milliGRAM(s) Oral before breakfast  piperacillin/tazobactam IVPB.. 3.375 Gram(s) IV Intermittent every 8 hours  QUEtiapine 25 milliGRAM(s) Oral two times a day  remdesivir  IVPB   IV Intermittent   remdesivir  IVPB 100 milliGRAM(s) IV Intermittent every 24 hours  senna 2 Tablet(s) Oral at bedtime  sodium bicarbonate 650 milliGRAM(s) Oral three times a day    MEDICATIONS  (PRN):  acetaminophen   Oral Liquid .. 650 milliGRAM(s) Oral every 6 hours PRN Temp greater or equal to 38C (100.4F), Moderate Pain (4 - 6)  dextrose Oral Gel 15 Gram(s) Oral once PRN Blood Glucose LESS THAN 70 milliGRAM(s)/deciliter  haloperidol    Injectable 2 milliGRAM(s) IntraMuscular once PRN Agitation      Allergies  No Known Allergies      Review of Systems:  limited, see HPI    Vital Signs Last 24 Hrs  T(C): 36.8 (10 Sep 2024 08:40), Max: 37 (10 Sep 2024 06:30)  T(F): 98.2 (10 Sep 2024 08:40), Max: 98.6 (10 Sep 2024 06:30)  HR: 86 (10 Sep 2024 08:40) (82 - 94)  BP: 152/84 (10 Sep 2024 08:40) (152/84 - 170/93)  BP(mean): --  RR: 18 (10 Sep 2024 08:40) (17 - 18)  SpO2: 97% (10 Sep 2024 08:40) (93% - 100%)    Parameters below as of 10 Sep 2024 08:40  Patient On (Oxygen Delivery Method): nasal cannula  O2 Flow (L/min): 2    PHYSICAL EXAM:  Constitutional: So  male responds to simple questions w/ non verbal responses; calmer today. non toxic appearing  Neck: no JVS  Respiratory: +rhonchi, no retractions, no wheeze  Cardiovascular: S1S2 tachy  Gastrointestinal: soft soft, obese NT +BS +G tube (16 Fr balloon bumper) Bumper at 4cm pam at skin level - spins easily 360 degrees.     Extremities: +muscle wasting, no edema +right elisa. s/p Right great toe amputation  Vascular: +pulses bl  Neurological: +aphasia, responds to yes/no questions with non verbal responses +right elisa  Psychiatric: calm, cooperative      LABS:                        8.5    8.27  )-----------( 245      ( 09 Sep 2024 06:30 )             26.8     09-10    x   |  x   |  x   ----------------------------<  x   x    |  x   |  2.76<H>    Ca    8.4      09 Sep 2024 06:28  Phos  4.9     09-09  Mg     2.0     09-09    TPro  6.0  /  Alb  3.3  /  TBili  0.8  /  DBili  0.2  /  AST  11  /  ALT  12  /  AlkPhos  97  09-10    PT/INR - ( 10 Sep 2024 07:12 )   PT: 11.7 sec;   INR: 1.12 ratio               RADIOLOGY & ADDITIONAL TESTS:    ACC: [id number] EXAM:  CT ABDOMEN AND PELVIS   ORDERED BY: REY TAPIA   ACC: [id number] EXAM:  CT CHEST   ORDERED BY:  VALENTE PACKER   PROCEDURE DATE:  09/09/2024        INTERPRETATION:  CLINICAL INFORMATION: New Covid. Episode of desaturation   and distress this morning. Recent hospitalization for pneumonia.    COMPARISON: None.    CONTRAST/COMPLICATIONS:  IV Contrast: NONE  Oral Contrast: NONE  Complications: None reported at time of study completion    PROCEDURE:  CT of the Chest, Abdomen and Pelvis was performed.  Sagittal and coronal reformats were performed.    FINDINGS:  CHEST:  LUNGS AND LARGE AIRWAYS: Patent central airways. No pulmonary nodules.   Mild central predominant groundglass opacity. Passive atelectasis at the   lung bases, right greater than left.  PLEURA: Moderate right pleural effusion. Small left pleural effusion.  VESSELS: Within normal limits.  HEART: Heart size is normal. No pericardial effusion.  MEDIASTINUM AND ANNIE: No lymphadenopathy.  CHEST WALL ANDLOWER NECK: Within normal limits.    ABDOMEN AND PELVIS:  LIVER: Within normal limits.  BILE DUCTS: Normal caliber.  GALLBLADDER: Within normal limits.  SPLEEN: Within normal limits.  PANCREAS: Within normal limits.  ADRENALS: Within normal limits.  KIDNEYS/URETERS: Within normal limits. No hydronephrosis.    BLADDER: Within normal limits.  REPRODUCTIVE ORGANS:    BOWEL: No bowel obstruction. Appendix is normal. Gastrostomy tube in the   stomach.  PERITONEUM/RETROPERITONEUM: Within normal limits.  VESSELS: Atherosclerotic calcifications.  LYMPH NODES: No lymphadenopathy.  ABDOMINAL WALL: Mild subcutaneous edema.  BONES: Degenerative changes.    IMPRESSION:  Mild bilateral groundglass opacity with right greater than left pleural   effusions.    Mild subcutaneous edema.    No evidence of acute abnormality in the abdomen and pelvis.      --- End of Report ---

## 2024-09-10 NOTE — PROGRESS NOTE ADULT - ASSESSMENT
49-year-old male with past medical history significant for diabetes, hypertension, CVA, hemiplegia with recent hospitalization for pneumonia undergoing course of meropenem who was admitted to the hospital from nursing home due to shortness of breath.    #Acute hypoxic respiratory failure secondary to COVID-19  #QUITA  #Pyuria  RRT on 9/9 AM  Started on piperacillin/tazobactam  CT chest with GGO bilateral, no consolidation, effusions noted as well    Recommendations  Continue remdesivir, dexamethasone  Plan for 5-day remdesivir course  Plan for 10-day dexamethasone course  No evidence for bacterial pneumonia on exam/imaging, possible aspiration pneumonitis  Would discontinue piperacillin/tazobactam  Maintain strict resolution precautions  Wean oxygen as able  Would not treat pyuria noted in UA  Follow fever curve and WBC count    Azael Acosta MD  Division of Infectious Diseases

## 2024-09-10 NOTE — PROGRESS NOTE ADULT - ASSESSMENT
Patient is a 50 yo M w. HTN, HLD, T2DM, prior CVA (w/ R hemiplegia, non verbal), s/p PEG, recent hospitalization for pneumonia (treated w/ Meropenem) who was initially admitted on 9/7 after p/w SOB and hypoxemia at NH. Patient tested positive for COVID here and initiated on treatment. RRT this AM for desaturation and distress after episode of PEG dysfunction. Pulmonary consulted for evaluation.    Labs notable for no leukocytosis WBC 8.2, Na 149, Bicarb 21, Creatinine 2.81 (improved form admission, unknown baseline), VBG 7.21/55/96, BNP 7847, UA -, COVID +.    CXR notable for small R pleural effusion and hazy bilateral opacities    CT chest notable for b/l pleural effusion and GGO, likely volume overload, also possible tracheomalacia    #COVID Pneumonia  #Acute hypoxemic respiratory failure  #Possible Aspiration vs ADHF  - c/w supplemental O2, wean as tolerated  - c/w bronchodilators ATC for now  - Agree w/ empiric Zosyn for now  - Agree w/ COVID treatment with Decadron and Remdesivir  - Recommend checking TTE, consider diuresis    Bar Levine MD  Pulmonary & Critical Care

## 2024-09-10 NOTE — PATIENT PROFILE ADULT - NSPROPTRIGHTBILLOFRIGHTS_GEN_A_NUR
SPEECH/LANGUAGE PATHOLOGY  CLINICAL ASSESSMENT OF SWALLOWING FUNCTION   and PLAN OF CARE    PATIENT NAME:  Henry Guerrero  (female)     MRN:  [id number]    :  1955  (73 y.o.)  STATUS:  Inpatient: Room 0616-    TODAY'S DATE:  22  REFERRING PROVIDER:  SLP swallowing-dysphagia evaluation and treatment Start: 22 1145, End: 22 1145, ONE TIME, Standing Count: 1 Occurrences, R Anjali Gomes MD  REASON FOR REFERRAL: dysphagia    EVALUATING THERAPIST: CHAPARRITA Jamil                 RESULTS:    DYSPHAGIA DIAGNOSIS:   Clinical indicators of moderate oropharyngeal phase dysphagia       DIET RECOMMENDATIONS:  Minced and moist consistency solids (IDDSI level 5) with  thin liquids (IDDSI level 0)     FEEDING RECOMMENDATIONS:     Assistance level:  Full assistance is needed during all oral intake, Encourage self-feeding      Compensatory strategies recommended: Small bites/sips, Alternate solids and liquids and Check for oral pocketing      Discussed recommendations with nursing and/or faxed report to referring provider: Yes    SPEECH THERAPY  PLAN OF CARE   The dysphagia POC is established based on physician order, dysphagia diagnosis and results of clinical assessment     Skilled SLP intervention for dysphagia management up to 5x per week until goals met, pt plateaus in function and/or discharged from hospital    Conditions Requiring Skilled Therapeutic Intervention for dysphagia:    Patient is performing below functional baseline d/t  current acute condition, Multiple diagnoses, multiple medications, and increased dependency upon caregivers.   incoordinated mastication with decreased recollection and poor bolus formation resulting in suspected posterior escape of bolus    Specific dysphagia interventions to include:     Training in positioning for improved integrity of swallow  Compensatory strategy training   Therapeutic exercises  Trials of upgraded diet/liquid     Specific instructions for next treatment:  development and training of compensatory swallow strategies to improve airway protection and swallow function  Patient Treatment Goals:    Short Term Goals:  Pt will implement identified compensatory swallowing strategies on 90% of opportunities or greater to improve airway protection and swallow function. Pt will participate in ongoing mealtime assessment to provide diet modification and compensatory strategy implementation to minimize risk of aspiration associated with PO intake  Pt will complete PO trials of upgraded diet textures with SLP only to determine the least restrictive PO diet to maintain adequate nutrition/hydration with no more than 1 overt s/s of pen/asp. Long Term Goals:   Pt will improve oropharyngeal swallow function to ensure airway protection during PO intake to maintain adequate nutrition/hydration and decrease signs/symptoms of aspiration to less than 1 x/day.    An ENT consult is recommended     Patient/family Goal:    Patient not able to accurately state due to impaired cognition and/or communication at time of eval     Plan of care discussed with Patient   The Patient did not demonstrate complete understanding of the diagnosis, prognosis and plan of care     Rehabilitation Potential/Prognosis: fair                    ADMITTING DIAGNOSIS: Dehydration [E86.0]  Colitis [K52.9]  Hypoxia [R09.02]  Elevated troponin [R77.8]  Acute respiratory failure with hypoxia (HCC) [J96.01]  Altered mental status, unspecified altered mental status type [R41.82]    VISIT DIAGNOSIS:   Visit Diagnoses       Codes    Colitis     K52.9    Dehydration     E86.0    Elevated troponin     R77.8           PATIENT REPORT/COMPLAINT: patient not able to accurately report  RN cleared patient for participation in assessment     yes and meal tray present during evaluation     PRIOR LEVEL OF SWALLOW FUNCTION:    PAST HISTORY OF DYSPHAGIA?: yes    Home diet: Diet information not available Current Diet Order:  ADULT DIET; Dysphagia - Minced and Moist    PROCEDURE:  Consistencies Administered During the Evaluation   Liquids: thin liquid   Solids:  soft solid foods and solid foods      Method of Intake:   cup, straw, spoon  Fed by clinician, Hand over hand assist      Position:   Seated, upright    CLINICAL ASSESSMENT:  Oral Stage:       Decreased mastication due to:  cognitive function and disorganized chewing pattern      Pharyngeal Stage:    No signs of aspiration were noted during this evaluation however, silent aspiration cannot be ruled out at bedside. If silent aspiration is suspected, a Videofluoroscopic Study of Swallowing (MBS) is recommended and requires a physician order. Cognition:   Follows 1 - step directions appropriate for this assessment and Confusion noted    Oral Peripheral Examination   Generalized oral weakness    Current Respiratory Status    2 liters nasal cannula     Parameters of Speech Production  Respiration:  Adequate for speech production  Quality:   Within functional limits  Intensity: Within functional limits    Volitional Swallow: not able to elicit     Volitional Cough:   not able to elicit     Pain: No pain reported. EDUCATION:   The Speech Language Pathologist (SLP) completed education regarding results of evaluation and that intervention is warranted at this time. Learner: Patient  Education: Reviewed results and recommendations of this evaluation  Evaluation of Education:  No evidence of learning    This plan may be re-evaluated and revised as warranted. Evaluation Time includes thorough review of current medical information, gathering information on past medical history/social history and prior level of function, completion of standardized testing/informal observation of tasks, assessment of data and education on plan of care and goals.     [x]The admitting diagnosis and active problem list, have been reviewed prior to initiation of this evaluation.         ACTIVE PROBLEM LIST:   Patient Active Problem List   Diagnosis    Peripheral vascular disease ([address]    Depression    Clotting disorder (HCC)    Abnormal EKG    Cancer ([address]    Over weight    S/p cadaver renal transplant    ESRD (end stage renal disease) ([address]    Non morbid obesity due to excess calories    Hypertension    Leg swelling    Lymphedema of both lower extremities    Acute gout involving toe of right foot    Sepsis secondary to UTI ([address]    Acute kidney injury superimposed on CKD ([address]    Thyroid disease    Acute on chronic combined systolic (congestive) and diastolic (congestive) heart failure (HCC)    Sepsis ([address]    Acute renal failure ([address]    CECILY (acute kidney injury) ([address]    Ischemic bowel disease ([address]    Altered mental status    Coma ([address]    Acute respiratory failure with hypoxia ([address]    Hypoxia         CPT code:  32015  bedside swallow carolyn Pan MSCCC/SLP  Speech Language Pathologist  [id number] pt unable to speak due to CVA; no family present at bedside

## 2024-09-10 NOTE — PROGRESS NOTE ADULT - ASSESSMENT
49-year-old male with past medical history of diabetes, hypertension, HLD. CVA with right sided hemiplegia, AF (now off AC 2/2 hx ICH), non verbal, dysphagia s/p PEG tube (last known exchange at [facility] 5/17/24 for 16 Fr balloon bumper G tube - [facility] chart review), recently treated pneumonia with meropenem (8/27-9/2) brought in by EMS from [facility] for difficulty breathing and hypoxia 85% RA, difficulty breathing per EMS and nursing home documentation.  Patient unable to provide additional history. Currently admitted and undergoing treatment for COVID-19 pneumonia.     s/p RRT called for hypoxia and distress 9/9 AM - suspected clogged G tube with return of previously administered bolus feed along with dyspnea/restlessness/anxiety    #dysphagia s/p PEG  bedside unclogging of PEG tube w/ endobrush 9/9/24 AM  9/9 AM AXR NOBGP +gas and stool throughout bowel, no pneumoperitoneum  9/9 CT CAP- mild GGO w/R>L pleural effusions. Mild SQ edema. no abnormality within abdomen/pelvis.  G tube in stomach    -ok to restart PEG feeds today; will give Miralax dose and then wait 1 hour to begin first bolus feed  -Bowel regimen: Miralax + Senna  -continnue PPI given pt on steroids and acute COVID infection    #COVID  -remdesivir, steroids per ID recs  -further care per primary team    #CKD w/ QUITA  -management per Renal    Discussed with Nursing, Medicine team    Jesús aL PA-C  [facility] Non Teaching GI Service  Available on TEAMS Mon-Fri 8a-4p  After hours and weekend coverage [phone]

## 2024-09-10 NOTE — PATIENT PROFILE ADULT - FALL HARM RISK - HARM RISK INTERVENTIONS
Assistance with ambulation/Assistance OOB with selected safe patient handling equipment/Communicate Risk of Fall with Harm to all staff/Monitor for mental status changes/Move patient closer to nurses' station/Reinforce activity limits and safety measures with patient and family/Reorient to person, place and time as needed/Tailored Fall Risk Interventions/Toileting schedule using arm’s reach rule for commode and bathroom/Use of alarms - bed, chair and/or voice tab/Visual Cue: Yellow wristband and red socks/Bed in lowest position, wheels locked, appropriate side rails in place/Call bell, personal items and telephone in reach/Instruct patient to call for assistance before getting out of bed or chair/Non-slip footwear when patient is out of bed/[place] to call system/Physically safe environment - no spills, clutter or unnecessary equipment/Purposeful Proactive Rounding/Room/bathroom lighting operational, light cord in reach

## 2024-09-10 NOTE — PROGRESS NOTE ADULT - ASSESSMENT
49-year-old male     h/o  HTN/ HLD. DM ,   CVA with right sided hemiplegia, non verbal, dysphagia ,has  PEG tube, recently treated pneumonia with meropenem (8/27-9/2) brought in by EMS from [facility] for difficulty breathing and hypoxia 85%   per   nursing home , pt has   cpvid   QUITA      sob/  hypoxia,  from covid/ pna    prior  h/o  pna's/  suspect  pt has   c/c  aspiration// pna/  cxr . infection  vx  fluid  overload       on   remdidvir/  decadron/  iv zosyn       gi  eval.   on iv  fluids     QUITA.  f/p  by enrrique;l  d r ali      CVA,  non verbal, /  r hemiplegia,  bed  bound nal  quadriplegia         follow  fs.  endo  d r monty     quita, resolving     c/c  aspiration. despite  peg      ct ca/p.  mild  goo,  r pl  effusion    dvt  ppx         pt  is  prior dbr/dni/  epr  n/home paper            49-year-old male     h/o  HTN/ HLD. DM ,   CVA with right sided hemiplegia, non verbal, dysphagia ,has  PEG tube, recently treated pneumonia with meropenem (8/27-9/2) brought in by EMS from [facility] for difficulty breathing and hypoxia 85%   per   nursing home , pt has   cpvid   QUITA      sob/  hypoxia,  from covid/ pna/  and  chf/ acute  diastolic     prior  h/o  pna's/  suspect  pt has   c/c  aspiration// pna/  cxr . infection  vx  fluid  overload       on   remdidvir/  decadron/   was  on  iv zosyn    per  id  dr raya bedoya,  no  sepsis,  and  no  need  for  ab     gi  eval.   on iv  fluids     QUITA.  f/p  by enrrique;l  d r ali      CVA,  non verbal, /  r hemiplegia,  bed  bound nal  quadriplegia         follow  fs.  endo  d r monty     quita, resolving     c/c  aspiration. despite  peg      ct ca/p.  mild  goo,  r pl  effusion,  on iv lasix/  seen by  pulm /  echo  pendingh    dvt  ppx         pt  is  prior dbr/dni/  epr  n/home paper

## 2024-09-10 NOTE — PROGRESS NOTE ADULT - SUBJECTIVE AND OBJECTIVE BOX
date of service: 09-10-24 @ 08:42  afberile  REVIEW OF SYSTEMS:  CONSTITUTIONAL: No fever,  no  weight loss  ENT:  No  tinnitus,   no   vertigo  NECK: No pain or stiffness  RESPIRATORY: No cough, wheezing, chills or hemoptysis;    No Shortness of Breath  CARDIOVASCULAR: No chest pain, palpitations, dizziness  GASTROINTESTINAL: No abdominal or epigastric pain. No nausea, vomiting, or hematemesis; No diarrhea  No melena or hematochezia.  GENITOURINARY: No dysuria, frequency, hematuria, or incontinence  NEUROLOGICAL: No headaches  SKIN: No itching,  no   rash  LYMPH Nodes: No enlarged glands  ENDOCRINE: No heat or cold intolerance  MUSCULOSKELETAL: No joint pain or swelling  PSYCHIATRIC: No depression, anxiety  HEME/LYMPH: No easy bruising, or bleeding gums  ALLERGY AND IMMUNOLOGIC: No hives or eczema	    MEDICATIONS  (STANDING):  albuterol    90 MICROgram(s) HFA Inhaler 2 Puff(s) Inhalation every 8 hours  albuterol/ipratropium for Nebulization 3 milliLiter(s) Nebulizer every 6 hours  amLODIPine   Tablet 10 milliGRAM(s) Oral daily  aspirin  chewable 81 milliGRAM(s) Oral daily  atorvastatin 40 milliGRAM(s) Oral at bedtime  dexAMETHasone  Injectable 6 milliGRAM(s) IV Push daily  dextrose 5%. 1000 milliLiter(s) (50 mL/Hr) IV Continuous <Continuous>  dextrose 5%. 1000 milliLiter(s) (100 mL/Hr) IV Continuous <Continuous>  dextrose 5%. 1000 milliLiter(s) (60 mL/Hr) IV Continuous <Continuous>  dextrose 50% Injectable 12.5 Gram(s) IV Push once  dextrose 50% Injectable 25 Gram(s) IV Push once  dextrose 50% Injectable 25 Gram(s) IV Push once  glucagon  Injectable 1 milliGRAM(s) IntraMuscular once  heparin   Injectable 5000 Unit(s) SubCutaneous every 12 hours  hydrALAZINE 25 milliGRAM(s) Oral three times a day  insulin lispro (ADMELOG) corrective regimen sliding scale   SubCutaneous every 6 hours  isosorbide   dinitrate Tablet (ISORDIL) 20 milliGRAM(s) Oral three times a day  labetalol 300 milliGRAM(s) Oral two times a day  pancrelipase (VIOKACE) for Occluded enteral feeding tubes 1 Tablet(s) Enteral Tube once  pantoprazole    Tablet 40 milliGRAM(s) Oral before breakfast  piperacillin/tazobactam IVPB.. 3.375 Gram(s) IV Intermittent every 8 hours  QUEtiapine 25 milliGRAM(s) Oral two times a day  remdesivir  IVPB 100 milliGRAM(s) IV Intermittent every 24 hours  remdesivir  IVPB   IV Intermittent   senna 2 Tablet(s) Oral at bedtime  sodium bicarbonate 650 milliGRAM(s) Oral three times a day    MEDICATIONS  (PRN):  acetaminophen   Oral Liquid .. 650 milliGRAM(s) Oral every 6 hours PRN Temp greater or equal to 38C (100.4F), Moderate Pain (4 - 6)  dextrose Oral Gel 15 Gram(s) Oral once PRN Blood Glucose LESS THAN 70 milliGRAM(s)/deciliter  haloperidol    Injectable 2 milliGRAM(s) IntraMuscular once PRN Agitation      Vital Signs Last 24 Hrs  T(C): 36.8 (10 Sep 2024 08:40), Max: 37 (10 Sep 2024 06:30)  T(F): 98.2 (10 Sep 2024 08:40), Max: 98.6 (10 Sep 2024 06:30)  HR: 86 (10 Sep 2024 08:40) (82 - 94)  BP: 152/84 (10 Sep 2024 08:40) (152/84 - 170/93)  BP(mean): --  RR: 18 (10 Sep 2024 08:40) (17 - 18)  SpO2: 97% (10 Sep 2024 08:40) (93% - 100%)    Parameters below as of 10 Sep 2024 08:40  Patient On (Oxygen Delivery Method): nasal cannula  O2 Flow (L/min): 2    CAPILLARY BLOOD GLUCOSE      POCT Blood Glucose.: 168 mg/dL (10 Sep 2024 08:40)  POCT Blood Glucose.: 163 mg/dL (10 Sep 2024 06:23)  POCT Blood Glucose.: 209 mg/dL (10 Sep 2024 00:18)  POCT Blood Glucose.: 228 mg/dL (09 Sep 2024 21:52)  POCT Blood Glucose.: 200 mg/dL (09 Sep 2024 18:11)  POCT Blood Glucose.: 184 mg/dL (09 Sep 2024 11:42)  POCT Blood Glucose.: 171 mg/dL (09 Sep 2024 09:18)    I&O's Summary    09 Sep 2024 07:01  -  10 Sep 2024 07:00  --------------------------------------------------------  IN: 200 mL / OUT: 0 mL / NET: 200 mL          Appearance: Normal	  HEENT:   Normal oral mucosa, PERRL, EOMI	  Lymphatic: No lymphadenopathy  Cardiovascular: Normal S1 S2, No JVD  Respiratory: Lungs clear to auscultation	  Gastrointestinal:  Soft, Non-tender, + BS	  Skin: No rash, No ecchymoses	  Extremities:     LABS:                        8.5    8.27  )-----------( 245      ( 09 Sep 2024 06:30 )             26.8     09-09    149<H>  |  116<H>  |  41<H>  ----------------------------<  177<H>  4.7   |  21<L>  |  2.81<H>    Ca    8.4      09 Sep 2024 06:28  Phos  4.9     09-09  Mg     2.0     09-09    TPro  6.5  /  Alb  3.5  /  TBili  0.5  /  DBili  0.1  /  AST  17  /  ALT  14  /  AlkPhos  120  09-09    PT/INR - ( 10 Sep 2024 07:12 )   PT: 11.7 sec;   INR: 1.12 ratio               Urinalysis Basic - ( 09 Sep 2024 06:28 )    Color: x / Appearance: x / SG: x / pH: x  Gluc: 177 mg/dL / Ketone: x  / Bili: x / Urobili: x   Blood: x / Protein: x / Nitrite: x   Leuk Esterase: x / RBC: x / WBC x   Sq Epi: x / Non Sq Epi: x / Bacteria: x          09-09 @ 06:19  4.9  96              Consultant(s) Notes Reviewed:      Care Discussed with Consultants/Other Providers:

## 2024-09-10 NOTE — PROGRESS NOTE ADULT - SUBJECTIVE AND OBJECTIVE BOX
Follow Up:  hypoxia, covid    Interval History/ROS:  Interval events noted.  RRT called on 9/9/2024  AM due to hypoxia, possible aspiration.  Patient started on piperacillin/tazobactam.    Overnight: No acute events.  Patient remains afebrile.  Otherwise hemodynamically stable.  Latest labs show hepatic function within normal limits, creatinine 2.7.    Patient seen examined at bedside.  Unable to obtain ROS.  Patient is nonverbal.  Allergies  No Known Allergies        ANTIMICROBIALS:  piperacillin/tazobactam IVPB.. 3.375 every 8 hours  remdesivir  IVPB    remdesivir  IVPB 100 every 24 hours      OTHER MEDS:  MEDICATIONS  (STANDING):  acetaminophen   Oral Liquid .. 650 every 6 hours PRN  albuterol    90 MICROgram(s) HFA Inhaler 2 every 8 hours  albuterol/ipratropium for Nebulization 3 every 6 hours  amLODIPine   Tablet 10 daily  aspirin  chewable 81 daily  atorvastatin 40 at bedtime  dexAMETHasone  Injectable 6 daily  dextrose 50% Injectable 12.5 once  dextrose 50% Injectable 25 once  dextrose 50% Injectable 25 once  dextrose Oral Gel 15 once PRN  glucagon  Injectable 1 once  haloperidol    Injectable 2 once PRN  heparin   Injectable 5000 every 12 hours  hydrALAZINE 25 three times a day  insulin lispro (ADMELOG) corrective regimen sliding scale  every 6 hours  isosorbide   dinitrate Tablet (ISORDIL) 20 three times a day  labetalol 300 two times a day  pancrelipase (VIOKACE) for Occluded enteral feeding tubes 1 once  pantoprazole    Tablet 40 before breakfast  polyethylene glycol 3350 17 two times a day  QUEtiapine 25 two times a day  senna 2 at bedtime      Vital Signs Last 24 Hrs  T(C): 36.8 (10 Sep 2024 08:40), Max: 37 (10 Sep 2024 06:30)  T(F): 98.2 (10 Sep 2024 08:40), Max: 98.6 (10 Sep 2024 06:30)  HR: 86 (10 Sep 2024 08:40) (82 - 94)  BP: 152/84 (10 Sep 2024 08:40) (152/84 - 170/93)  BP(mean): --  RR: 18 (10 Sep 2024 08:40) (17 - 18)  SpO2: 97% (10 Sep 2024 08:40) (93% - 100%)    Parameters below as of 10 Sep 2024 08:40  Patient On (Oxygen Delivery Method): nasal cannula  O2 Flow (L/min): 2      PHYSICAL EXAM:  Constitutional: no distress, nonverbal  HEAD/EYES: anicteric, no conjunctival injection  ENT:  supple, no thrush  Cardiovascular:   normal S1, S2, no murmur, no edema  Respiratory:  clear BS bilaterally, no wheezes, no rales  GI:  soft, non-tender, normal bowel sounds  :  no taylor, no CVA tenderness  Musculoskeletal:  no synovitis, normal ROM  Skin:  no rash, no erythema, no phlebitis  Heme/Onc: no lymphadenopathy                             8.5    8.27  )-----------( 245      ( 09 Sep 2024 06:30 )             26.8       09-10    x   |  x   |  x   ----------------------------<  x   x    |  x   |  2.76<H>    Ca    8.4      09 Sep 2024 06:28  Phos  4.9     09-09  Mg     2.0     09-09    TPro  6.0  /  Alb  3.3  /  TBili  0.8  /  DBili  0.2  /  AST  11  /  ALT  12  /  AlkPhos  97  09-10      Urinalysis Basic - ( 09 Sep 2024 06:28 )    Color: x / Appearance: x / SG: x / pH: x  Gluc: 177 mg/dL / Ketone: x  / Bili: x / Urobili: x   Blood: x / Protein: x / Nitrite: x   Leuk Esterase: x / RBC: x / WBC x   Sq Epi: x / Non Sq Epi: x / Bacteria: x        MICROBIOLOGY:  v    Culture - Urine (collected 07 Sep 2024 20:32)  Source: Clean Catch Clean Catch (Midstream)  Final Report (09 Sep 2024 07:20):    No growth    Culture - Blood (collected 07 Sep 2024 19:25)  Source: .Blood Blood-Peripheral  Preliminary Report (10 Sep 2024 02:02):    No growth at 48 Hours    Culture - Blood (collected 07 Sep 2024 19:10)  Source: .Blood Blood-Peripheral  Preliminary Report (10 Sep 2024 02:02):    No growth at 48 Hours                    RADIOLOGY:  Imaging reviewed

## 2024-09-10 NOTE — PROGRESS NOTE ADULT - SUBJECTIVE AND OBJECTIVE BOX
CHIEF COMPLAINT: SOB    Interval Events: No acute events overnight. Now on 1 L nasal cannula. CT chest notable for volume overload    REVIEW OF SYSTEMS:  Constitutional: [ ] negative [ ] fevers [ ] chills [ ] weight loss [ ] weight gain  HEENT: [ ] negative [ ] dry eyes [ ] eye irritation [ ] postnasal drip [ ] nasal congestion  CV: [ ] negative  [ ] chest pain [ ] orthopnea [ ] palpitations [ ] murmur  Resp: [ ] negative [ ] cough [ ] shortness of breath [ ] dyspnea [ ] wheezing [ ] sputum [ ] hemoptysis  GI: [ ] negative [ ] nausea [ ] vomiting [ ] diarrhea [ ] constipation [ ] abd pain [ ] dysphagia   : [ ] negative [ ] dysuria [ ] nocturia [ ] hematuria [ ] increased urinary frequency  Musculoskeletal: [ ] negative [ ] back pain [ ] myalgias [ ] arthralgias [ ] fracture  Skin: [ ] negative [ ] rash [ ] itch  Neurological: [ ] negative [ ] headache [ ] dizziness [ ] syncope [ ] weakness [ ] numbness  Psychiatric: [ ] negative [ ] anxiety [ ] depression  Endocrine: [ ] negative [ ] diabetes [ ] thyroid problem  Hematologic/Lymphatic: [ ] negative [ ] anemia [ ] bleeding problem  Allergic/Immunologic: [ ] negative [ ] itchy eyes [ ] nasal discharge [ ] hives [ ] angioedema  [ ] All other systems negative  [X] Unable to assess ROS because nonverbal    OBJECTIVE:  ICU Vital Signs Last 24 Hrs  T(C): 36.8 (10 Sep 2024 08:40), Max: 37 (10 Sep 2024 06:30)  T(F): 98.2 (10 Sep 2024 08:40), Max: 98.6 (10 Sep 2024 06:30)  HR: 86 (10 Sep 2024 08:40) (82 - 94)  BP: 152/84 (10 Sep 2024 08:40) (152/84 - 170/93)  BP(mean): --  ABP: --  ABP(mean): --  RR: 18 (10 Sep 2024 08:40) (17 - 18)  SpO2: 97% (10 Sep 2024 08:40) (93% - 100%)    O2 Parameters below as of 10 Sep 2024 08:40  Patient On (Oxygen Delivery Method): nasal cannula  O2 Flow (L/min): 2            09-09 @ 07:01  -  09-10 @ 07:00  --------------------------------------------------------  IN: 200 mL / OUT: 0 mL / NET: 200 mL    09-10 @ 07:01  -  09-10 @ 11:17  --------------------------------------------------------  IN: 0 mL / OUT: 0 mL / NET: 0 mL      CAPILLARY BLOOD GLUCOSE      POCT Blood Glucose.: 168 mg/dL (10 Sep 2024 08:40)      PHYSICAL EXAM:  General: NAD, resting comfortably  HEENT: EOMI, PERRLA  Neck: Supple  Respiratory: CTAB  Cardiovascular: S1S2, RRR  Abdomen: Soft, NTND, PEG +  Extremities: No edema  Skin: Warm and dry  Neurological: Awake able to indicate yes or no      HOSPITAL MEDICATIONS:  MEDICATIONS  (STANDING):  albuterol    90 MICROgram(s) HFA Inhaler 2 Puff(s) Inhalation every 8 hours  albuterol/ipratropium for Nebulization 3 milliLiter(s) Nebulizer every 6 hours  amLODIPine   Tablet 10 milliGRAM(s) Oral daily  aspirin  chewable 81 milliGRAM(s) Oral daily  atorvastatin 40 milliGRAM(s) Oral at bedtime  dexAMETHasone  Injectable 6 milliGRAM(s) IV Push daily  dextrose 5%. 1000 milliLiter(s) (100 mL/Hr) IV Continuous <Continuous>  dextrose 5%. 1000 milliLiter(s) (50 mL/Hr) IV Continuous <Continuous>  dextrose 50% Injectable 25 Gram(s) IV Push once  dextrose 50% Injectable 25 Gram(s) IV Push once  dextrose 50% Injectable 12.5 Gram(s) IV Push once  glucagon  Injectable 1 milliGRAM(s) IntraMuscular once  heparin   Injectable 5000 Unit(s) SubCutaneous every 12 hours  hydrALAZINE 25 milliGRAM(s) Oral three times a day  insulin lispro (ADMELOG) corrective regimen sliding scale   SubCutaneous every 6 hours  isosorbide   dinitrate Tablet (ISORDIL) 20 milliGRAM(s) Oral three times a day  labetalol 300 milliGRAM(s) Oral two times a day  pancrelipase (VIOKACE) for Occluded enteral feeding tubes 1 Tablet(s) Enteral Tube once  pantoprazole    Tablet 40 milliGRAM(s) Oral before breakfast  piperacillin/tazobactam IVPB.. 3.375 Gram(s) IV Intermittent every 8 hours  polyethylene glycol 3350 17 Gram(s) Oral two times a day  QUEtiapine 25 milliGRAM(s) Oral two times a day  remdesivir  IVPB 100 milliGRAM(s) IV Intermittent every 24 hours  remdesivir  IVPB   IV Intermittent   senna 2 Tablet(s) Oral at bedtime  sodium bicarbonate 650 milliGRAM(s) Oral three times a day    MEDICATIONS  (PRN):  acetaminophen   Oral Liquid .. 650 milliGRAM(s) Oral every 6 hours PRN Temp greater or equal to 38C (100.4F), Moderate Pain (4 - 6)  dextrose Oral Gel 15 Gram(s) Oral once PRN Blood Glucose LESS THAN 70 milliGRAM(s)/deciliter  haloperidol    Injectable 2 milliGRAM(s) IntraMuscular once PRN Agitation      LABS:                        8.5    8.27  )-----------( 245      ( 09 Sep 2024 06:30 )             26.8     Hgb Trend: 8.5<--, 8.3<--, 8.5<--  09-10    x   |  x   |  x   ----------------------------<  x   x    |  x   |  2.76<H>    Ca    8.4      09 Sep 2024 06:28  Phos  4.9     09-09  Mg     2.0     09-09    TPro  6.0  /  Alb  3.3  /  TBili  0.8  /  DBili  0.2  /  AST  11  /  ALT  12  /  AlkPhos  97  09-10    Creatinine Trend: 2.76<--, 2.81<--, 3.16<--, 3.27<--  PT/INR - ( 10 Sep 2024 07:12 )   PT: 11.7 sec;   INR: 1.12 ratio           Urinalysis Basic - ( 09 Sep 2024 06:28 )    Color: x / Appearance: x / SG: x / pH: x  Gluc: 177 mg/dL / Ketone: x  / Bili: x / Urobili: x   Blood: x / Protein: x / Nitrite: x   Leuk Esterase: x / RBC: x / WBC x   Sq Epi: x / Non Sq Epi: x / Bacteria: x        Venous Blood Gas:  09-09 @ 06:19  7.21/55/96/22/97.0  VBG Lactate: 1.0      MICROBIOLOGY:     Culture - Urine (collected 07 Sep 2024 20:32)  Source: Clean Catch Clean Catch (Midstream)  Final Report (09 Sep 2024 07:20):    No growth    Culture - Blood (collected 07 Sep 2024 19:25)  Source: .Blood Blood-Peripheral  Preliminary Report (10 Sep 2024 02:02):    No growth at 48 Hours    Culture - Blood (collected 07 Sep 2024 19:10)  Source: .Blood Blood-Peripheral  Preliminary Report (10 Sep 2024 02:02):    No growth at 48 Hours        RADIOLOGY:  [ ] Reviewed and interpreted by me    PULMONARY FUNCTION TESTS:    EKG:

## 2024-09-11 LAB
ALBUMIN SERPL ELPH-MCNC: 3.5 G/DL — SIGNIFICANT CHANGE UP (ref 3.3–5)
ALP SERPL-CCNC: 98 U/L — SIGNIFICANT CHANGE UP (ref 40–120)
ALT FLD-CCNC: 17 U/L — SIGNIFICANT CHANGE UP (ref 10–45)
ANION GAP SERPL CALC-SCNC: 14 MMOL/L — SIGNIFICANT CHANGE UP (ref 5–17)
AST SERPL-CCNC: 21 U/L — SIGNIFICANT CHANGE UP (ref 10–40)
BILIRUB DIRECT SERPL-MCNC: 0.2 MG/DL — SIGNIFICANT CHANGE UP (ref 0–0.3)
BILIRUB INDIRECT FLD-MCNC: 0.6 MG/DL — SIGNIFICANT CHANGE UP (ref 0.2–1)
BILIRUB SERPL-MCNC: 0.8 MG/DL — SIGNIFICANT CHANGE UP (ref 0.2–1.2)
BUN SERPL-MCNC: 52 MG/DL — HIGH (ref 7–23)
CALCIUM SERPL-MCNC: 8.8 MG/DL — SIGNIFICANT CHANGE UP (ref 8.4–10.5)
CHLORIDE SERPL-SCNC: 112 MMOL/L — HIGH (ref 96–108)
CO2 SERPL-SCNC: 19 MMOL/L — LOW (ref 22–31)
CREAT SERPL-MCNC: 2.71 MG/DL — HIGH (ref 0.5–1.3)
CREAT SERPL-MCNC: 2.78 MG/DL — HIGH (ref 0.5–1.3)
EGFR: 27 ML/MIN/1.73M2 — LOW
EGFR: 28 ML/MIN/1.73M2 — LOW
GLUCOSE BLDC GLUCOMTR-MCNC: 125 MG/DL — HIGH (ref 70–99)
GLUCOSE BLDC GLUCOMTR-MCNC: 144 MG/DL — HIGH (ref 70–99)
GLUCOSE BLDC GLUCOMTR-MCNC: 163 MG/DL — HIGH (ref 70–99)
GLUCOSE BLDC GLUCOMTR-MCNC: 191 MG/DL — HIGH (ref 70–99)
GLUCOSE BLDC GLUCOMTR-MCNC: 200 MG/DL — HIGH (ref 70–99)
GLUCOSE BLDC GLUCOMTR-MCNC: 219 MG/DL — HIGH (ref 70–99)
GLUCOSE SERPL-MCNC: 125 MG/DL — HIGH (ref 70–99)
HCT VFR BLD CALC: 27 % — LOW (ref 39–50)
HGB BLD-MCNC: 8.8 G/DL — LOW (ref 13–17)
INR BLD: 1.03 RATIO — SIGNIFICANT CHANGE UP (ref 0.85–1.18)
MCHC RBC-ENTMCNC: 28.3 PG — SIGNIFICANT CHANGE UP (ref 27–34)
MCHC RBC-ENTMCNC: 32.6 GM/DL — SIGNIFICANT CHANGE UP (ref 32–36)
MCV RBC AUTO: 86.8 FL — SIGNIFICANT CHANGE UP (ref 80–100)
NRBC # BLD: 0 /100 WBCS — SIGNIFICANT CHANGE UP (ref 0–0)
PLATELET # BLD AUTO: 273 K/UL — SIGNIFICANT CHANGE UP (ref 150–400)
POTASSIUM SERPL-MCNC: 4.6 MMOL/L — SIGNIFICANT CHANGE UP (ref 3.5–5.3)
POTASSIUM SERPL-SCNC: 4.6 MMOL/L — SIGNIFICANT CHANGE UP (ref 3.5–5.3)
PROT SERPL-MCNC: 6.6 G/DL — SIGNIFICANT CHANGE UP (ref 6–8.3)
PROTHROM AB SERPL-ACNC: 11.3 SEC — SIGNIFICANT CHANGE UP (ref 9.5–13)
RBC # BLD: 3.11 M/UL — LOW (ref 4.2–5.8)
RBC # FLD: 14.9 % — HIGH (ref 10.3–14.5)
SODIUM SERPL-SCNC: 145 MMOL/L — SIGNIFICANT CHANGE UP (ref 135–145)
WBC # BLD: 8.38 K/UL — SIGNIFICANT CHANGE UP (ref 3.8–10.5)
WBC # FLD AUTO: 8.38 K/UL — SIGNIFICANT CHANGE UP (ref 3.8–10.5)

## 2024-09-11 PROCEDURE — 99233 SBSQ HOSP IP/OBS HIGH 50: CPT

## 2024-09-11 RX ORDER — FUROSEMIDE 40 MG
20 TABLET ORAL DAILY
Refills: 0 | Status: DISCONTINUED | OUTPATIENT
Start: 2024-09-11 | End: 2024-09-14

## 2024-09-11 RX ORDER — PANTOPRAZOLE SODIUM 40 MG
40 TABLET, DELAYED RELEASE (ENTERIC COATED) ORAL DAILY
Refills: 0 | Status: DISCONTINUED | OUTPATIENT
Start: 2024-09-11 | End: 2024-09-18

## 2024-09-11 RX ORDER — DEXAMETHASONE 0.75 MG
6 TABLET ORAL DAILY
Refills: 0 | Status: DISCONTINUED | OUTPATIENT
Start: 2024-09-11 | End: 2024-09-15

## 2024-09-11 RX ORDER — POLYETHYLENE GLYCOL 3350 17 G/17G
17 POWDER, FOR SOLUTION ORAL DAILY
Refills: 0 | Status: DISCONTINUED | OUTPATIENT
Start: 2024-09-11 | End: 2024-09-18

## 2024-09-11 RX ADMIN — IPRATROPIUM BROMIDE AND ALBUTEROL SULFATE 3 MILLILITER(S): .5; 3 SOLUTION RESPIRATORY (INHALATION) at 17:13

## 2024-09-11 RX ADMIN — SODIUM BICARBONATE 650 MILLIGRAM(S): 84 INJECTION, SOLUTION INTRAVENOUS at 21:18

## 2024-09-11 RX ADMIN — Medication 1: at 17:13

## 2024-09-11 RX ADMIN — POLYETHYLENE GLYCOL 3350 17 GRAM(S): 17 POWDER, FOR SOLUTION ORAL at 06:01

## 2024-09-11 RX ADMIN — Medication 40 MILLIGRAM(S): at 21:18

## 2024-09-11 RX ADMIN — AMLODIPINE BESYLATE 10 MILLIGRAM(S): 10 TABLET ORAL at 06:02

## 2024-09-11 RX ADMIN — Medication 20 MILLIGRAM(S): at 21:18

## 2024-09-11 RX ADMIN — Medication 2 PUFF(S): at 06:01

## 2024-09-11 RX ADMIN — Medication 25 MILLIGRAM(S): at 06:02

## 2024-09-11 RX ADMIN — Medication 1: at 00:35

## 2024-09-11 RX ADMIN — Medication 300 MILLIGRAM(S): at 06:02

## 2024-09-11 RX ADMIN — Medication 81 MILLIGRAM(S): at 13:38

## 2024-09-11 RX ADMIN — Medication 25 MILLIGRAM(S): at 09:30

## 2024-09-11 RX ADMIN — Medication 2 PUFF(S): at 13:37

## 2024-09-11 RX ADMIN — Medication 5000 UNIT(S): at 17:13

## 2024-09-11 RX ADMIN — SODIUM BICARBONATE 650 MILLIGRAM(S): 84 INJECTION, SOLUTION INTRAVENOUS at 13:38

## 2024-09-11 RX ADMIN — Medication 20 MILLIGRAM(S): at 13:37

## 2024-09-11 RX ADMIN — Medication 25 MILLIGRAM(S): at 13:38

## 2024-09-11 RX ADMIN — Medication 2: at 15:35

## 2024-09-11 RX ADMIN — Medication 40 MILLIGRAM(S): at 13:37

## 2024-09-11 RX ADMIN — Medication 20 MILLIGRAM(S): at 06:02

## 2024-09-11 RX ADMIN — Medication 3 MILLIGRAM(S): at 21:19

## 2024-09-11 RX ADMIN — ACETAMINOPHEN 650 MILLIGRAM(S): 325 TABLET ORAL at 21:47

## 2024-09-11 RX ADMIN — ACETAMINOPHEN 650 MILLIGRAM(S): 325 TABLET ORAL at 21:17

## 2024-09-11 RX ADMIN — Medication 5000 UNIT(S): at 06:59

## 2024-09-11 RX ADMIN — IPRATROPIUM BROMIDE AND ALBUTEROL SULFATE 3 MILLILITER(S): .5; 3 SOLUTION RESPIRATORY (INHALATION) at 06:01

## 2024-09-11 RX ADMIN — Medication 20 MILLIGRAM(S): at 06:01

## 2024-09-11 RX ADMIN — Medication 2 PUFF(S): at 21:19

## 2024-09-11 RX ADMIN — IPRATROPIUM BROMIDE AND ALBUTEROL SULFATE 3 MILLILITER(S): .5; 3 SOLUTION RESPIRATORY (INHALATION) at 00:34

## 2024-09-11 RX ADMIN — Medication 20 MILLIGRAM(S): at 09:31

## 2024-09-11 RX ADMIN — Medication 6 MILLIGRAM(S): at 06:03

## 2024-09-11 RX ADMIN — Medication 300 MILLIGRAM(S): at 17:14

## 2024-09-11 RX ADMIN — Medication 25 MILLIGRAM(S): at 21:19

## 2024-09-11 RX ADMIN — REMDESIVIR 200 MILLIGRAM(S): 5 INJECTION INTRAVENOUS at 09:30

## 2024-09-11 RX ADMIN — IPRATROPIUM BROMIDE AND ALBUTEROL SULFATE 3 MILLILITER(S): .5; 3 SOLUTION RESPIRATORY (INHALATION) at 13:36

## 2024-09-11 RX ADMIN — Medication 50 MILLIGRAM(S): at 21:19

## 2024-09-11 RX ADMIN — SODIUM BICARBONATE 650 MILLIGRAM(S): 84 INJECTION, SOLUTION INTRAVENOUS at 06:02

## 2024-09-11 NOTE — DISCHARGE NOTE PROVIDER - NSDCMRMEDTOKEN_GEN_ALL_CORE_FT
amLODIPine 10 mg oral tablet: 1 tab(s) by gastrostomy tube once a day  aspirin 81 mg oral tablet, chewable: 1 tab(s) by gastrostomy tube once a day  atorvastatin 40 mg oral tablet: 1 tab(s) by gastrostomy tube once a day (at bedtime)  ferrous sulfate 325 mg (65 mg elemental iron) oral delayed release tablet: 1 tab(s) by gastrostomy tube once a day  hydrALAZINE 25 mg oral tablet: 1 tab(s) by gastrostomy tube 2 times a day  isosorbide dinitrate 20 mg oral tablet: 1 tab(s) by gastrostomy tube 3 times a day  labetalol 300 mg oral tablet: 1 tab(s) by gastrostomy tube 2 times a day  Lasix 40 mg oral tablet: 1 tab(s) by gastrostomy tube once a day  PeriGuard topical ointment: Apply topically to affected area 2 times a day  senna (sennosides) 8.6 mg oral tablet: 2 tab(s) by gastrostomy tube once a day (at bedtime)  Seroquel 25 mg oral tablet: 1 tab(s) by gastrostomy tube 2 times a day  sodium bicarbonate: 1,300 milligram(s) by gastrostomy tube 3 times a day  Vitamin C 250 mg oral tablet, chewable: 2 tab(s) by gastrostomy tube once a day   albuterol 90 mcg/inh inhalation aerosol: 2 puff(s) inhaled every 8 hours  amLODIPine 10 mg oral tablet: 1 tab(s) orally once a day  atorvastatin 40 mg oral tablet: 1 tab(s) by gastrostomy tube once a day (at bedtime)  carvedilol 12.5 mg oral tablet: 1 tab(s) orally every 12 hours  ferrous sulfate 325 mg (65 mg elemental iron) oral delayed release tablet: 1 tab(s) by gastrostomy tube once a day  furosemide 40 mg oral tablet: 1 tab(s) orally once a day  hydrALAZINE 25 mg oral tablet: 1 tab(s) orally 3 times a day  isosorbide dinitrate 20 mg oral tablet: 1 tab(s) by gastrostomy tube 3 times a day  pantoprazole 40 mg oral granule, delayed release: 1 tab(s) orally once a day  polyethylene glycol 3350 oral powder for reconstitution: 17 gram(s) orally once a day  QUEtiapine 25 mg oral tablet: 1 tab(s) orally once a day  QUEtiapine 50 mg oral tablet: 1 tab(s) orally once a day (at bedtime)  sodium bicarbonate 650 mg oral tablet: 1 tab(s) orally 3 times a day  Vitamin C 250 mg oral tablet, chewable: 2 tab(s) by gastrostomy tube once a day  zinc oxide 20% topical ointment: 1 Apply topically to affected area 2 times a day

## 2024-09-11 NOTE — PROGRESS NOTE ADULT - SUBJECTIVE AND OBJECTIVE BOX
NEPHROLOGY-[facility] [phone]        Patient seen and examined in bed.  He was the same but again more alert and interactive         MEDICATIONS  (STANDING):  albuterol    90 MICROgram(s) HFA Inhaler 2 Puff(s) Inhalation every 8 hours  albuterol/ipratropium for Nebulization 3 milliLiter(s) Nebulizer every 6 hours  amLODIPine   Tablet 10 milliGRAM(s) Oral daily  aspirin  chewable 81 milliGRAM(s) Oral daily  atorvastatin 40 milliGRAM(s) Oral at bedtime  dexAMETHasone  Injectable 6 milliGRAM(s) IV Push daily  dextrose 5%. 1000 milliLiter(s) (50 mL/Hr) IV Continuous <Continuous>  dextrose 5%. 1000 milliLiter(s) (100 mL/Hr) IV Continuous <Continuous>  dextrose 50% Injectable 12.5 Gram(s) IV Push once  dextrose 50% Injectable 25 Gram(s) IV Push once  dextrose 50% Injectable 25 Gram(s) IV Push once  furosemide   Injectable 20 milliGRAM(s) IV Push daily  glucagon  Injectable 1 milliGRAM(s) IntraMuscular once  heparin   Injectable 5000 Unit(s) SubCutaneous every 12 hours  hydrALAZINE 25 milliGRAM(s) Oral three times a day  insulin lispro (ADMELOG) corrective regimen sliding scale   SubCutaneous every 6 hours  isosorbide   dinitrate Tablet (ISORDIL) 20 milliGRAM(s) Oral three times a day  labetalol 300 milliGRAM(s) Oral two times a day  melatonin 3 milliGRAM(s) Oral at bedtime  pancrelipase (VIOKACE) for Occluded enteral feeding tubes 1 Tablet(s) Enteral Tube once  pantoprazole   Suspension 40 milliGRAM(s) Enteral Tube daily  polyethylene glycol 3350 17 Gram(s) Oral two times a day  QUEtiapine 25 milliGRAM(s) Oral daily  QUEtiapine 50 milliGRAM(s) Oral at bedtime  remdesivir  IVPB 100 milliGRAM(s) IV Intermittent every 24 hours  remdesivir  IVPB   IV Intermittent   senna 2 Tablet(s) Oral at bedtime  sodium bicarbonate 650 milliGRAM(s) Oral three times a day      VITAL:  T(C): , Max: 37.1 (09-10-24 @ 13:03)  T(F): , Max: 98.8 (09-10-24 @ 13:03)  HR: 93 (09-11-24 @ 05:13)  BP: 159/94 (09-11-24 @ 05:13)  BP(mean): --  RR: 18 (09-11-24 @ 05:13)  SpO2: 96% (09-11-24 @ 05:13)  Wt(kg): --    I and O's:    09-10 @ 07:01  -  09-11 @ 07:00  --------------------------------------------------------  IN: 0 mL / OUT: 0 mL / NET: 0 mL          PHYSICAL EXAM:    Constitutional: NAD  Neck:  No JVD  Respiratory: poor effort   Cardiovascular: S1 and S2  Gastrointestinal: BS+, soft, NT/ND  Extremities: No peripheral edema  Neurological: right sided weakness   Psychiatric: Normal mood, normal affect  : No [place]  Skin: No rashes  Access: Not applicable    LABS:    09-10    x   |  x   |  x   ----------------------------<  x   x    |  x   |  2.76<H>      TPro  6.0  /  Alb  3.3  /  TBili  0.8  /  DBili  0.2  /  AST  11  /  ALT  12  /  AlkPhos  97  09-10          Urine Studies:          RADIOLOGY & ADDITIONAL STUDIES:

## 2024-09-11 NOTE — PROGRESS NOTE ADULT - SUBJECTIVE AND OBJECTIVE BOX
**** INCOMPLETE NOTE ****    INTERVAL HPI/OVERNIGHT EVENTS:  +BMs after laxatives    MEDICATIONS  (STANDING):  albuterol    90 MICROgram(s) HFA Inhaler 2 Puff(s) Inhalation every 8 hours  albuterol/ipratropium for Nebulization 3 milliLiter(s) Nebulizer every 6 hours  amLODIPine   Tablet 10 milliGRAM(s) Oral daily  aspirin  chewable 81 milliGRAM(s) Oral daily  atorvastatin 40 milliGRAM(s) Oral at bedtime  dexAMETHasone  Injectable 6 milliGRAM(s) IV Push daily  dextrose 5%. 1000 milliLiter(s) (50 mL/Hr) IV Continuous <Continuous>  dextrose 5%. 1000 milliLiter(s) (100 mL/Hr) IV Continuous <Continuous>  dextrose 50% Injectable 12.5 Gram(s) IV Push once  dextrose 50% Injectable 25 Gram(s) IV Push once  dextrose 50% Injectable 25 Gram(s) IV Push once  furosemide    Tablet 20 milliGRAM(s) Oral daily  glucagon  Injectable 1 milliGRAM(s) IntraMuscular once  heparin   Injectable 5000 Unit(s) SubCutaneous every 12 hours  hydrALAZINE 25 milliGRAM(s) Oral three times a day  insulin lispro (ADMELOG) corrective regimen sliding scale   SubCutaneous every 6 hours  isosorbide   dinitrate Tablet (ISORDIL) 20 milliGRAM(s) Oral three times a day  labetalol 300 milliGRAM(s) Oral two times a day  melatonin 3 milliGRAM(s) Oral at bedtime  pancrelipase (VIOKACE) for Occluded enteral feeding tubes 1 Tablet(s) Enteral Tube once  pantoprazole   Suspension 40 milliGRAM(s) Enteral Tube daily  polyethylene glycol 3350 17 Gram(s) Oral two times a day  QUEtiapine 50 milliGRAM(s) Oral at bedtime  QUEtiapine 25 milliGRAM(s) Oral daily  remdesivir  IVPB 100 milliGRAM(s) IV Intermittent every 24 hours  remdesivir  IVPB   IV Intermittent   senna 2 Tablet(s) Oral at bedtime  sodium bicarbonate 650 milliGRAM(s) Oral three times a day    MEDICATIONS  (PRN):  acetaminophen   Oral Liquid .. 650 milliGRAM(s) Oral every 6 hours PRN Temp greater or equal to 38C (100.4F), Moderate Pain (4 - 6)  dextrose Oral Gel 15 Gram(s) Oral once PRN Blood Glucose LESS THAN 70 milliGRAM(s)/deciliter  haloperidol    Injectable 2 milliGRAM(s) IntraMuscular once PRN Agitation      Allergies  No Known Allergies      Review of Systems:  limited, see HPI    Vital Signs Last 24 Hrs  T(C): 36.5 (11 Sep 2024 05:13), Max: 37.1 (10 Sep 2024 13:03)  T(F): 97.7 (11 Sep 2024 05:13), Max: 98.8 (10 Sep 2024 13:03)  HR: 93 (11 Sep 2024 05:13) (89 - 97)  BP: 159/94 (11 Sep 2024 05:13) (145/77 - 174/98)  BP(mean): --  RR: 18 (11 Sep 2024 05:13) (18 - 18)  SpO2: 96% (11 Sep 2024 05:13) (91% - 97%)    Parameters below as of 11 Sep 2024 05:13  Patient On (Oxygen Delivery Method): room air      PHYSICAL EXAM:      LABS:                        8.8    8.38  )-----------( 273      ( 11 Sep 2024 08:10 )             27.0     09-11    145  |  112<H>  |  52<H>  ----------------------------<  125<H>  4.6   |  19<L>  |  2.78<H>    Ca    8.8      11 Sep 2024 08:10    TPro  6.6  /  Alb  3.5  /  TBili  0.8  /  DBili  0.2  /  AST  21  /  ALT  17  /  AlkPhos  98  09-11    PT/INR - ( 11 Sep 2024 08:10 )   PT: 11.3 sec;   INR: 1.03 ratio           Urinalysis Basic - ( 11 Sep 2024 08:10 )    Color: x / Appearance: x / SG: x / pH: x  Gluc: 125 mg/dL / Ketone: x  / Bili: x / Urobili: x   Blood: x / Protein: x / Nitrite: x   Leuk Esterase: x / RBC: x / WBC x   Sq Epi: x / Non Sq Epi: x / Bacteria: x      LIVER FUNCTIONS - ( 11 Sep 2024 08:10 )  Alb: 3.5 g/dL / Pro: 6.6 g/dL / ALK PHOS: 98 U/L / ALT: 17 U/L / AST: 21 U/L / GGT: x             RADIOLOGY & ADDITIONAL TESTS:     INTERVAL HPI/OVERNIGHT EVENTS:  +BMs after laxatives - multiple soft BMs yesterday  stools now loose today  no nausea or vomiting  more alert and now verbally responsive  breathing comfortably on rom air    MEDICATIONS  (STANDING):  albuterol    90 MICROgram(s) HFA Inhaler 2 Puff(s) Inhalation every 8 hours  albuterol/ipratropium for Nebulization 3 milliLiter(s) Nebulizer every 6 hours  amLODIPine   Tablet 10 milliGRAM(s) Oral daily  aspirin  chewable 81 milliGRAM(s) Oral daily  atorvastatin 40 milliGRAM(s) Oral at bedtime  dexAMETHasone  Injectable 6 milliGRAM(s) IV Push daily  dextrose 5%. 1000 milliLiter(s) (50 mL/Hr) IV Continuous <Continuous>  dextrose 5%. 1000 milliLiter(s) (100 mL/Hr) IV Continuous <Continuous>  dextrose 50% Injectable 12.5 Gram(s) IV Push once  dextrose 50% Injectable 25 Gram(s) IV Push once  dextrose 50% Injectable 25 Gram(s) IV Push once  furosemide    Tablet 20 milliGRAM(s) Oral daily  glucagon  Injectable 1 milliGRAM(s) IntraMuscular once  heparin   Injectable 5000 Unit(s) SubCutaneous every 12 hours  hydrALAZINE 25 milliGRAM(s) Oral three times a day  insulin lispro (ADMELOG) corrective regimen sliding scale   SubCutaneous every 6 hours  isosorbide   dinitrate Tablet (ISORDIL) 20 milliGRAM(s) Oral three times a day  labetalol 300 milliGRAM(s) Oral two times a day  melatonin 3 milliGRAM(s) Oral at bedtime  pancrelipase (VIOKACE) for Occluded enteral feeding tubes 1 Tablet(s) Enteral Tube once  pantoprazole   Suspension 40 milliGRAM(s) Enteral Tube daily  polyethylene glycol 3350 17 Gram(s) Oral two times a day  QUEtiapine 50 milliGRAM(s) Oral at bedtime  QUEtiapine 25 milliGRAM(s) Oral daily  remdesivir  IVPB 100 milliGRAM(s) IV Intermittent every 24 hours  remdesivir  IVPB   IV Intermittent   senna 2 Tablet(s) Oral at bedtime  sodium bicarbonate 650 milliGRAM(s) Oral three times a day    MEDICATIONS  (PRN):  acetaminophen   Oral Liquid .. 650 milliGRAM(s) Oral every 6 hours PRN Temp greater or equal to 38C (100.4F), Moderate Pain (4 - 6)  dextrose Oral Gel 15 Gram(s) Oral once PRN Blood Glucose LESS THAN 70 milliGRAM(s)/deciliter  haloperidol    Injectable 2 milliGRAM(s) IntraMuscular once PRN Agitation      Allergies  No Known Allergies      Review of Systems:  limited, see HPI    Vital Signs Last 24 Hrs  T(C): 36.5 (11 Sep 2024 05:13), Max: 37.1 (10 Sep 2024 13:03)  T(F): 97.7 (11 Sep 2024 05:13), Max: 98.8 (10 Sep 2024 13:03)  HR: 93 (11 Sep 2024 05:13) (89 - 97)  BP: 159/94 (11 Sep 2024 05:13) (145/77 - 174/98)  BP(mean): --  RR: 18 (11 Sep 2024 05:13) (18 - 18)  SpO2: 96% (11 Sep 2024 05:13) (91% - 97%)    Parameters below as of 11 Sep 2024 05:13  Patient On (Oxygen Delivery Method): room air      PHYSICAL EXAM:  Constitutional: So  male alert and verbally responsive   sitting up in bed  Neck: no JVS  Respiratory: clear, no retractions, no wheeze  Cardiovascular: S1S2 regular  Gastrointestinal: soft soft, obese NT +BS +G tube (16 Fr balloon bumper) Bumper at 4cm pam at skin level - spins easily 360 degrees.     Extremities: +muscle wasting, no edema +right elisa. s/p Right great toe amputation  Vascular: +pulses bl  Neurological: alert, responsive +right elisa  Psychiatric: calm, cooperative      LABS:                        8.8    8.38  )-----------( 273      ( 11 Sep 2024 08:10 )             27.0     09-11    145  |  112<H>  |  52<H>  ----------------------------<  125<H>  4.6   |  19<L>  |  2.78<H>    Ca    8.8      11 Sep 2024 08:10    TPro  6.6  /  Alb  3.5  /  TBili  0.8  /  DBili  0.2  /  AST  21  /  ALT  17  /  AlkPhos  98  09-11    PT/INR - ( 11 Sep 2024 08:10 )   PT: 11.3 sec;   INR: 1.03 ratio           Urinalysis Basic - ( 11 Sep 2024 08:10 )    Color: x / Appearance: x / SG: x / pH: x  Gluc: 125 mg/dL / Ketone: x  / Bili: x / Urobili: x   Blood: x / Protein: x / Nitrite: x   Leuk Esterase: x / RBC: x / WBC x   Sq Epi: x / Non Sq Epi: x / Bacteria: x          RADIOLOGY & ADDITIONAL TESTS:

## 2024-09-11 NOTE — PROGRESS NOTE ADULT - SUBJECTIVE AND OBJECTIVE BOX
Chief complaint  Patient is a 49y old  Male who presents with a chief complaint of SOB (11 Sep 2024 10:36)         Labs and Fingersticks  CAPILLARY BLOOD GLUCOSE      POCT Blood Glucose.: 191 mg/dL (11 Sep 2024 13:42)  POCT Blood Glucose.: 144 mg/dL (11 Sep 2024 05:36)  POCT Blood Glucose.: 163 mg/dL (11 Sep 2024 00:02)  POCT Blood Glucose.: 212 mg/dL (10 Sep 2024 17:21)      Anion Gap: 14 (09-11 @ 08:10)      Calcium: 8.8 (09-11 @ 08:10)  Albumin: 3.5 (09-11 @ 08:10)  Albumin: 3.3 (09-10 @ 07:08)    Alanine Aminotransferase (ALT/SGPT): 17 (09-11 @ 08:10)  Alanine Aminotransferase (ALT/SGPT): 12 (09-10 @ 07:08)  Alkaline Phosphatase: 98 (09-11 @ 08:10)  Alkaline Phosphatase: 97 (09-10 @ 07:08)  Aspartate Aminotransferase (AST/SGOT): 21 (09-11 @ 08:10)  Aspartate Aminotransferase (AST/SGOT): 11 (09-10 @ 07:08)        09-11    145  |  112<H>  |  52<H>  ----------------------------<  125<H>  4.6   |  19<L>  |  2.78<H>    Ca    8.8      11 Sep 2024 08:10    TPro  6.6  /  Alb  3.5  /  TBili  0.8  /  DBili  0.2  /  AST  21  /  ALT  17  /  AlkPhos  98  09-11                        8.8    8.38  )-----------( 273      ( 11 Sep 2024 08:10 )             27.0     Medications  MEDICATIONS  (STANDING):  albuterol    90 MICROgram(s) HFA Inhaler 2 Puff(s) Inhalation every 8 hours  albuterol/ipratropium for Nebulization 3 milliLiter(s) Nebulizer every 6 hours  amLODIPine   Tablet 10 milliGRAM(s) Oral daily  aspirin  chewable 81 milliGRAM(s) Oral daily  atorvastatin 40 milliGRAM(s) Oral at bedtime  dexAMETHasone     Tablet 6 milliGRAM(s) Oral daily  dextrose 5%. 1000 milliLiter(s) (50 mL/Hr) IV Continuous <Continuous>  dextrose 5%. 1000 milliLiter(s) (100 mL/Hr) IV Continuous <Continuous>  dextrose 50% Injectable 12.5 Gram(s) IV Push once  dextrose 50% Injectable 25 Gram(s) IV Push once  dextrose 50% Injectable 25 Gram(s) IV Push once  furosemide    Tablet 20 milliGRAM(s) Oral daily  glucagon  Injectable 1 milliGRAM(s) IntraMuscular once  heparin   Injectable 5000 Unit(s) SubCutaneous every 12 hours  hydrALAZINE 25 milliGRAM(s) Oral three times a day  insulin lispro (ADMELOG) corrective regimen sliding scale   SubCutaneous every 6 hours  isosorbide   dinitrate Tablet (ISORDIL) 20 milliGRAM(s) Oral three times a day  labetalol 300 milliGRAM(s) Oral two times a day  melatonin 3 milliGRAM(s) Oral at bedtime  pancrelipase (VIOKACE) for Occluded enteral feeding tubes 1 Tablet(s) Enteral Tube once  pantoprazole   Suspension 40 milliGRAM(s) Enteral Tube daily  polyethylene glycol 3350 17 Gram(s) Oral daily  QUEtiapine 25 milliGRAM(s) Oral daily  QUEtiapine 50 milliGRAM(s) Oral at bedtime  remdesivir  IVPB   IV Intermittent   remdesivir  IVPB 100 milliGRAM(s) IV Intermittent every 24 hours  sodium bicarbonate 650 milliGRAM(s) Oral three times a day      Physical Exam  General: Patient in bed   Vital Signs Last 12 Hrs  T(F): 98.5 (09-11-24 @ 10:40), Max: 98.5 (09-11-24 @ 10:40)  HR: 92 (09-11-24 @ 10:40) (92 - 93)  BP: 154/86 (09-11-24 @ 10:40) (154/86 - 159/94)  BP(mean): --  RR: 18 (09-11-24 @ 10:40) (18 - 18)  SpO2: 98% (09-11-24 @ 10:40) (96% - 98%)    CVS: S1S2   Respiratory: No wheezing, no crepitations  GI: Abdomen soft, bowel sounds positive  Musculoskeletal:  moves all extremities  : Voiding          Chief complaint  Patient is a 49y old  Male who presents with a chief complaint of SOB (11 Sep 2024 10:36)     Labs and Fingersticks  CAPILLARY BLOOD GLUCOSE      POCT Blood Glucose.: 191 mg/dL (11 Sep 2024 13:42)  POCT Blood Glucose.: 144 mg/dL (11 Sep 2024 05:36)  POCT Blood Glucose.: 163 mg/dL (11 Sep 2024 00:02)  POCT Blood Glucose.: 212 mg/dL (10 Sep 2024 17:21)      Anion Gap: 14 (09-11 @ 08:10)      Calcium: 8.8 (09-11 @ 08:10)  Albumin: 3.5 (09-11 @ 08:10)  Albumin: 3.3 (09-10 @ 07:08)    Alanine Aminotransferase (ALT/SGPT): 17 (09-11 @ 08:10)  Alanine Aminotransferase (ALT/SGPT): 12 (09-10 @ 07:08)  Alkaline Phosphatase: 98 (09-11 @ 08:10)  Alkaline Phosphatase: 97 (09-10 @ 07:08)  Aspartate Aminotransferase (AST/SGOT): 21 (09-11 @ 08:10)  Aspartate Aminotransferase (AST/SGOT): 11 (09-10 @ 07:08)        09-11    145  |  112<H>  |  52<H>  ----------------------------<  125<H>  4.6   |  19<L>  |  2.78<H>    Ca    8.8      11 Sep 2024 08:10    TPro  6.6  /  Alb  3.5  /  TBili  0.8  /  DBili  0.2  /  AST  21  /  ALT  17  /  AlkPhos  98  09-11                        8.8    8.38  )-----------( 273      ( 11 Sep 2024 08:10 )             27.0     Medications  MEDICATIONS  (STANDING):  albuterol    90 MICROgram(s) HFA Inhaler 2 Puff(s) Inhalation every 8 hours  albuterol/ipratropium for Nebulization 3 milliLiter(s) Nebulizer every 6 hours  amLODIPine   Tablet 10 milliGRAM(s) Oral daily  aspirin  chewable 81 milliGRAM(s) Oral daily  atorvastatin 40 milliGRAM(s) Oral at bedtime  dexAMETHasone     Tablet 6 milliGRAM(s) Oral daily  dextrose 5%. 1000 milliLiter(s) (50 mL/Hr) IV Continuous <Continuous>  dextrose 5%. 1000 milliLiter(s) (100 mL/Hr) IV Continuous <Continuous>  dextrose 50% Injectable 12.5 Gram(s) IV Push once  dextrose 50% Injectable 25 Gram(s) IV Push once  dextrose 50% Injectable 25 Gram(s) IV Push once  furosemide    Tablet 20 milliGRAM(s) Oral daily  glucagon  Injectable 1 milliGRAM(s) IntraMuscular once  heparin   Injectable 5000 Unit(s) SubCutaneous every 12 hours  hydrALAZINE 25 milliGRAM(s) Oral three times a day  insulin lispro (ADMELOG) corrective regimen sliding scale   SubCutaneous every 6 hours  isosorbide   dinitrate Tablet (ISORDIL) 20 milliGRAM(s) Oral three times a day  labetalol 300 milliGRAM(s) Oral two times a day  melatonin 3 milliGRAM(s) Oral at bedtime  pancrelipase (VIOKACE) for Occluded enteral feeding tubes 1 Tablet(s) Enteral Tube once  pantoprazole   Suspension 40 milliGRAM(s) Enteral Tube daily  polyethylene glycol 3350 17 Gram(s) Oral daily  QUEtiapine 25 milliGRAM(s) Oral daily  QUEtiapine 50 milliGRAM(s) Oral at bedtime  remdesivir  IVPB   IV Intermittent   remdesivir  IVPB 100 milliGRAM(s) IV Intermittent every 24 hours  sodium bicarbonate 650 milliGRAM(s) Oral three times a day      Physical Exam  General: Patient in bed   Vital Signs Last 12 Hrs  T(F): 98.5 (09-11-24 @ 10:40), Max: 98.5 (09-11-24 @ 10:40)  HR: 92 (09-11-24 @ 10:40) (92 - 93)  BP: 154/86 (09-11-24 @ 10:40) (154/86 - 159/94)  BP(mean): --  RR: 18 (09-11-24 @ 10:40) (18 - 18)  SpO2: 98% (09-11-24 @ 10:40) (96% - 98%)    CVS: S1S2   Respiratory: No wheezing, no crepitations  GI: Abdomen soft, bowel sounds positive  Musculoskeletal:  moves all extremities  : Voiding

## 2024-09-11 NOTE — PROGRESS NOTE ADULT - ASSESSMENT
49-year-old male h/o  HTN/ HLD. DM ,   CVA with right sided hemiplegia, non verbal, dysphagia ,has  PEG tube, recently treated pneumonia brought in by EMS from [facility] for difficulty breathing and hypoxia   per   nursing home , pt has   covid   QUITA      Assessment  Hyperglycemia: 49y Male with hyperglycemias, being started on tube feeds via PEG tube, A1c stable, not on DM meds, started on sliding scale now.  Glucose improving.   COVID+: on medications, on isolation , monitored.  HTN: on antihypertensive medications, monitored, asymptomatic.    Discussed plan and management wit Dr Mau León MD  Cell: [phone]  Office: [phone]               49-year-old male h/o  HTN/ HLD. DM ,   CVA with right sided hemiplegia, non verbal, dysphagia ,has  PEG tube, recently treated pneumonia brought in by EMS from [facility] for difficulty breathing and hypoxia   per   nursing home , pt has   covid   QUITA    Assessment  Hyperglycemia: 49y Male with hyperglycemias, being started on tube feeds via PEG tube, A1c stable, not on DM meds, started on sliding scale now.  Glucose improving.   COVID+: on medications, on isolation , monitored.  HTN: on antihypertensive medications, monitored, asymptomatic.    Discussed plan and management wit Dr Mau León MD  Cell: [phone]  Office: [phone]

## 2024-09-11 NOTE — PROGRESS NOTE ADULT - SUBJECTIVE AND OBJECTIVE BOX
date of service: 09-11-24 @ 09:08  afberile  REVIEW OF SYSTEMS:  CONSTITUTIONAL: No fever,  no  weight loss  ENT:  No  tinnitus,   no   vertigo  NECK: No pain or stiffness  RESPIRATORY: No cough, wheezing, chills or hemoptysis;    No Shortness of Breath  CARDIOVASCULAR: No chest pain, palpitations, dizziness  GASTROINTESTINAL: No abdominal or epigastric pain. No nausea, vomiting, or hematemesis; No diarrhea  No melena or hematochezia.  GENITOURINARY: No dysuria, frequency, hematuria, or incontinence  NEUROLOGICAL: No headaches  SKIN: No itching,  no   rash  LYMPH Nodes: No enlarged glands  ENDOCRINE: No heat or cold intolerance  MUSCULOSKELETAL: No joint pain or swelling  PSYCHIATRIC: No depression, anxiety  HEME/LYMPH: No easy bruising, or bleeding gums  ALLERGY AND IMMUNOLOGIC: No hives or eczema	    MEDICATIONS  (STANDING):  albuterol    90 MICROgram(s) HFA Inhaler 2 Puff(s) Inhalation every 8 hours  albuterol/ipratropium for Nebulization 3 milliLiter(s) Nebulizer every 6 hours  amLODIPine   Tablet 10 milliGRAM(s) Oral daily  aspirin  chewable 81 milliGRAM(s) Oral daily  atorvastatin 40 milliGRAM(s) Oral at bedtime  dexAMETHasone  Injectable 6 milliGRAM(s) IV Push daily  dextrose 5%. 1000 milliLiter(s) (50 mL/Hr) IV Continuous <Continuous>  dextrose 5%. 1000 milliLiter(s) (100 mL/Hr) IV Continuous <Continuous>  dextrose 50% Injectable 12.5 Gram(s) IV Push once  dextrose 50% Injectable 25 Gram(s) IV Push once  dextrose 50% Injectable 25 Gram(s) IV Push once  furosemide    Tablet 20 milliGRAM(s) Oral daily  glucagon  Injectable 1 milliGRAM(s) IntraMuscular once  heparin   Injectable 5000 Unit(s) SubCutaneous every 12 hours  hydrALAZINE 25 milliGRAM(s) Oral three times a day  insulin lispro (ADMELOG) corrective regimen sliding scale   SubCutaneous every 6 hours  isosorbide   dinitrate Tablet (ISORDIL) 20 milliGRAM(s) Oral three times a day  labetalol 300 milliGRAM(s) Oral two times a day  melatonin 3 milliGRAM(s) Oral at bedtime  pancrelipase (VIOKACE) for Occluded enteral feeding tubes 1 Tablet(s) Enteral Tube once  pantoprazole   Suspension 40 milliGRAM(s) Enteral Tube daily  polyethylene glycol 3350 17 Gram(s) Oral two times a day  QUEtiapine 25 milliGRAM(s) Oral daily  QUEtiapine 50 milliGRAM(s) Oral at bedtime  remdesivir  IVPB 100 milliGRAM(s) IV Intermittent every 24 hours  remdesivir  IVPB   IV Intermittent   senna 2 Tablet(s) Oral at bedtime  sodium bicarbonate 650 milliGRAM(s) Oral three times a day    MEDICATIONS  (PRN):  acetaminophen   Oral Liquid .. 650 milliGRAM(s) Oral every 6 hours PRN Temp greater or equal to 38C (100.4F), Moderate Pain (4 - 6)  dextrose Oral Gel 15 Gram(s) Oral once PRN Blood Glucose LESS THAN 70 milliGRAM(s)/deciliter  haloperidol    Injectable 2 milliGRAM(s) IntraMuscular once PRN Agitation      Vital Signs Last 24 Hrs  T(C): 36.5 (11 Sep 2024 05:13), Max: 37.1 (10 Sep 2024 13:03)  T(F): 97.7 (11 Sep 2024 05:13), Max: 98.8 (10 Sep 2024 13:03)  HR: 93 (11 Sep 2024 05:13) (89 - 97)  BP: 159/94 (11 Sep 2024 05:13) (145/77 - 174/98)  BP(mean): --  RR: 18 (11 Sep 2024 05:13) (18 - 18)  SpO2: 96% (11 Sep 2024 05:13) (91% - 97%)    Parameters below as of 11 Sep 2024 05:13  Patient On (Oxygen Delivery Method): room air      CAPILLARY BLOOD GLUCOSE      POCT Blood Glucose.: 144 mg/dL (11 Sep 2024 05:36)  POCT Blood Glucose.: 163 mg/dL (11 Sep 2024 00:02)  POCT Blood Glucose.: 212 mg/dL (10 Sep 2024 17:21)  POCT Blood Glucose.: 184 mg/dL (10 Sep 2024 11:51)    I&O's Summary    10 Sep 2024 07:01  -  11 Sep 2024 07:00  --------------------------------------------------------  IN: 0 mL / OUT: 0 mL / NET: 0 mL          Appearance: Normal	  HEENT:   Normal oral mucosa, PERRL, EOMI	  Lymphatic: No lymphadenopathy  Cardiovascular: Normal S1 S2, No JVD  Respiratory: Lungs clear to auscultation	  Gastrointestinal:  Soft, Non-tender, + BS	  Skin: No rash, No ecchymoses	  Extremities:     LABS:                        8.8    8.38  )-----------( 273      ( 11 Sep 2024 08:10 )             27.0     09-11    145  |  112<H>  |  52<H>  ----------------------------<  125<H>  4.6   |  19<L>  |  2.78<H>    Ca    8.8      11 Sep 2024 08:10    TPro  6.6  /  Alb  3.5  /  TBili  0.8  /  DBili  0.2  /  AST  21  /  ALT  17  /  AlkPhos  98  09-11    PT/INR - ( 11 Sep 2024 08:10 )   PT: 11.3 sec;   INR: 1.03 ratio               Urinalysis Basic - ( 11 Sep 2024 08:10 )    Color: x / Appearance: x / SG: x / pH: x  Gluc: 125 mg/dL / Ketone: x  / Bili: x / Urobili: x   Blood: x / Protein: x / Nitrite: x   Leuk Esterase: x / RBC: x / WBC x   Sq Epi: x / Non Sq Epi: x / Bacteria: x              Thyroid Stimulating Hormone, Serum: 2.73 uIU/mL (09-10 @ 07:22)          Consultant(s) Notes Reviewed:      Care Discussed with Consultants/Other Providers:

## 2024-09-11 NOTE — PROVIDER CONTACT NOTE (OTHER) - RECOMMENDATIONS
Pt has 0600 labs - wait to draw labs until 6am when pt is more calm and it is safe for staff.
notified provider of BP & requested IV BP med.

## 2024-09-11 NOTE — PROVIDER CONTACT NOTE (OTHER) - ASSESSMENT
Pt AOx2-3; Unable to understand due pt garbled speech. Unable to get midnight labs due to pt thrashing; unsafe for staff. Pt AOx1-2; Unable to understand due pt garbled speech. Unable to get midnight labs due to pt thrashing; unsafe for staff.

## 2024-09-11 NOTE — PROGRESS NOTE ADULT - SUBJECTIVE AND OBJECTIVE BOX
Date of Service: 09-11-24 @ 07:35           CARDIOLOGY     PROGRESS  NOTE   ________________________________________________    CHIEF COMPLAINT:Patient is a 49y old  Male who presents with a chief complaint of SOB (10 Sep 2024 13:17)  comfortable  	  REVIEW OF SYSTEMS:  CONSTITUTIONAL: No fever, weight loss, or fatigue  EYES: No eye pain, visual disturbances, or discharge  ENT:  No difficulty hearing, tinnitus, vertigo; No sinus or throat pain  NECK: No pain or stiffness  RESPIRATORY: No cough, wheezing, chills or hemoptysis; No Shortness of Breath  CARDIOVASCULAR: No chest pain, palpitations, passing out, dizziness, or leg swelling  GASTROINTESTINAL: No abdominal or epigastric pain. No nausea, vomiting, or hematemesis; No diarrhea or constipation. No melena or hematochezia.  GENITOURINARY: No dysuria, frequency, hematuria, or incontinence  NEUROLOGICAL: No headaches, memory loss, loss of strength, numbness, or tremors  SKIN: No itching, burning, rashes, or lesions   LYMPH Nodes: No enlarged glands  ENDOCRINE: No heat or cold intolerance; No hair loss  MUSCULOSKELETAL: No joint pain or swelling; No muscle, back, or extremity pain  PSYCHIATRIC: No depression, anxiety, mood swings, or difficulty sleeping  HEME/LYMPH: No easy bruising, or bleeding gums  ALLERGY AND IMMUNOLOGIC: No hives or eczema	    [ ] All others negative	  [ x] Unable to obtain    PHYSICAL EXAM:  T(C): 36.5 (09-11-24 @ 05:13), Max: 37.1 (09-10-24 @ 13:03)  HR: 93 (09-11-24 @ 05:13) (86 - 97)  BP: 159/94 (09-11-24 @ 05:13) (145/77 - 174/98)  RR: 18 (09-11-24 @ 05:13) (18 - 18)  SpO2: 96% (09-11-24 @ 05:13) (91% - 97%)  Wt(kg): --  I&O's Summary    10 Sep 2024 07:01  -  11 Sep 2024 07:00  --------------------------------------------------------  IN: 0 mL / OUT: 0 mL / NET: 0 mL        Appearance: Normal	  HEENT:   Normal oral mucosa, PERRL, EOMI	  Lymphatic: No lymphadenopathy  Cardiovascular: Normal S1 S2, No JVD, +murmurs, No edema  Respiratory: rhonchi  Gastrointestinal:  Soft, Non-tender, + BS	  Skin: No rashes, No ecchymoses, No cyanosis	  Extremities: Normal range of motion, No clubbing, cyanosis or edema  Vascular: Peripheral pulses palpable 2+ bilaterally    MEDICATIONS  (STANDING):  albuterol    90 MICROgram(s) HFA Inhaler 2 Puff(s) Inhalation every 8 hours  albuterol/ipratropium for Nebulization 3 milliLiter(s) Nebulizer every 6 hours  amLODIPine   Tablet 10 milliGRAM(s) Oral daily  aspirin  chewable 81 milliGRAM(s) Oral daily  atorvastatin 40 milliGRAM(s) Oral at bedtime  dexAMETHasone  Injectable 6 milliGRAM(s) IV Push daily  dextrose 5%. 1000 milliLiter(s) (100 mL/Hr) IV Continuous <Continuous>  dextrose 5%. 1000 milliLiter(s) (50 mL/Hr) IV Continuous <Continuous>  dextrose 50% Injectable 25 Gram(s) IV Push once  dextrose 50% Injectable 25 Gram(s) IV Push once  dextrose 50% Injectable 12.5 Gram(s) IV Push once  furosemide   Injectable 20 milliGRAM(s) IV Push daily  glucagon  Injectable 1 milliGRAM(s) IntraMuscular once  heparin   Injectable 5000 Unit(s) SubCutaneous every 12 hours  hydrALAZINE 25 milliGRAM(s) Oral three times a day  insulin lispro (ADMELOG) corrective regimen sliding scale   SubCutaneous every 6 hours  isosorbide   dinitrate Tablet (ISORDIL) 20 milliGRAM(s) Oral three times a day  labetalol 300 milliGRAM(s) Oral two times a day  melatonin 3 milliGRAM(s) Oral at bedtime  pancrelipase (VIOKACE) for Occluded enteral feeding tubes 1 Tablet(s) Enteral Tube once  pantoprazole   Suspension 40 milliGRAM(s) Enteral Tube daily  polyethylene glycol 3350 17 Gram(s) Oral two times a day  QUEtiapine 50 milliGRAM(s) Oral at bedtime  QUEtiapine 25 milliGRAM(s) Oral daily  remdesivir  IVPB 100 milliGRAM(s) IV Intermittent every 24 hours  remdesivir  IVPB   IV Intermittent   senna 2 Tablet(s) Oral at bedtime  sodium bicarbonate 650 milliGRAM(s) Oral three times a day      TELEMETRY: 	    ECG:  	  RADIOLOGY:  OTHER: 	  	  LABS:	 	    CARDIAC MARKERS:            09-10    x   |  x   |  x   ----------------------------<  x   x    |  x   |  2.76<H>      TPro  6.0  /  Alb  3.3  /  TBili  0.8  /  DBili  0.2  /  AST  11  /  ALT  12  /  AlkPhos  97  09-10    proBNP:   Lipid Profile:   HgA1c:   TSH: Thyroid Stimulating Hormone, Serum: 2.73 uIU/mL (09-10 @ 07:22)    PT/INR - ( 10 Sep 2024 07:12 )   PT: 11.7 sec;   INR: 1.12 ratio           < from: CT Chest No Cont (09.09.24 @ 17:49) >  Mild bilateral groundglass opacity with right greater than left pleural   effusions.    Mild subcutaneous edema.    No evidence of acute abnormality in the abdomen and pelvis.        Assessment and plan  ---------------------------  49-year-old male with past medical history of diabetes, hypertension, HLD. CVA with right sided hemiplegia, non verbal, dysphagia on PEG tube, recently treated pneumonia with meropenem (8/27-9/2) brought in by EMS from [facility] for difficulty breathing and hypoxia 85% RA, difficulty breathing per EMS and nursing home documentation.  Patient unable to provide additional history.     pt with sig medical and cardiac hx with increasing sob, COVID +, recently treated with pneumoniae  check d dimer if elevated , vq scan to r/o PE  echo  ID eval RDV increase creatinine renal eval  pt with hx of PAF ?AC will discus with Dr Prasad  CAD/ ASHD continue cardiac meds will adjust  increase bp will adjust meds, may increase hydralazine dose  ID noted   CKD continue to observe , on sodium bicarb  renal ultrasound  dvt prophylaxis, check d dimer noted  awaiting echo  chest ct with moderate R  pleural effusion, awaiting echo, may need therapeutic and diagnostic thoracentesis  peg feeding as per medicine  may consider to dc ivf, free water via the peg  bp is better controlled with increase hydralazine dose  may add diuretics will discuss with renal  still awaiting ECHO, may consider pulmonary eval  check lytes

## 2024-09-11 NOTE — PROGRESS NOTE ADULT - ASSESSMENT
49-year-old male with past medical history of diabetes, hypertension, HLD. CVA with right sided hemiplegia, non verbal, dysphagia on PEG tube, recently treated pneumonia with meropenem (8/27-9/2) brought in by EMS from [facility] for difficulty breathing and hypoxia 85% RA, difficulty breathing per EMS and nursing home documentation. found to have COVID and QUITA vs advanced CKD. Nephrology consulted for QUITA vs CKD    Suspect QUITA on CKD vs advanced CKD  - Nephrotic syndrome   -QUITA 2/2 prerenal azotemia? vs chronicity        COVID virus  -on RDV    dysphagia  -s/p PEG    CVA  -R sided hemiplegia    RECOMMEND:  -  Change lasix to po now    - bicarb 650 TID through peg.  Will adjust when labs are present   - PEG feeds to be restarted today   -monitor resp status closely and less oxygen   -Dose Rx for CrCl 20-25mL/min;  Awaiting labs    - GIven overall state I suspect ARB + SGLT2 would be warranted here when the creatinine is stable       Sayed [facility]   [phone]

## 2024-09-11 NOTE — PROGRESS NOTE ADULT - ASSESSMENT
Patient is a 50 yo M w. HTN, HLD, T2DM, prior CVA (w/ R hemiplegia, non verbal), s/p PEG, recent hospitalization for pneumonia (treated w/ Meropenem) who was initially admitted on 9/7 after p/w SOB and hypoxemia at NH. Patient tested positive for COVID here and initiated on treatment. RRT this AM for desaturation and distress after episode of PEG dysfunction. Pulmonary consulted for evaluation.      CT chest notable for b/l pleural effusion and GGO, likely volume overload, also possible tracheomalacia    #COVID Pneumonia  #Acute hypoxemic respiratory failure - Improved  #Possible Aspiration vs ADHF  #Pleural Effusions  - Change bronchodilators to PRN  - Now off ZOsyn  - Complete COVID treatment with Decadron and Remdesivir  - f/u TTE, diuresis to maintain euvolemia    Pulmonary to sign off, please call back if any questions    Bar Levine MD  Pulmonary & Critical Care

## 2024-09-11 NOTE — DISCHARGE NOTE PROVIDER - CONDITIONS AT DISCHARGE
[facility]  MATERNAL CHILD HEALTH   SOCIAL WORK PROGRESS NOTE      DATA:     SW spoke to Belen and her , Allan, over the phone.   Belen is scheduled for a D&E on .     Belen indicates an interest in resources for support including groups and private therapy for them as individual/ the couple.     Parents indicate an interest in individual disposition of cremation. They will be in conversation about determining the facility they will work with and be back in touch with SW.      INTERVENTION:       This  reviewed the chart and coordinated with the health care team.     This  introduced myself and my role as their Maternal-Child Health , including role and scope of practice.     Validated and normalized expressed emotions.     Provided emotional support and active listening.      ASSESSMENT:     Belen and Allan seem to be processing their loss and anticipating interest in accessing community supports. They are receptive and appreciative of SW call for support today.     PLAN:     SW to be in contact regarding to facility to utilize for cremation services.     Kjerstin Rydeen, [facility]   Social Worker  Maternal Child Health   Direct: [phone]  Pager: [phone]    
Cleared by Dr. Curtis

## 2024-09-11 NOTE — PROGRESS NOTE ADULT - ASSESSMENT
49-year-old male     h/o  HTN/ HLD. DM ,   CVA with right sided hemiplegia, non verbal, dysphagia ,has  PEG tube, recently treated pneumonia with meropenem (8/27-9/2) brought in by EMS from [facility] for difficulty breathing and hypoxia 85%   per   nursing home , pt has   cpvid   QUITA      sob/  hypoxia,  from covid/ pna/  and  chf/ acute  diastolic     prior  h/o  pna's/  suspect  pt has   c/c  aspiration// pna/  cxr . infection  vx  fluid  overload       on   remdidvir/  decadron/   was  on  iv zosyn    per  id  dr raya bedoya,  no  sepsis,  and  no  need  for  ab     gi  eval.   on iv  fluids     QUITA.  f/p  by enrrique;l  d r ali      CVA,  non verbal, /  r hemiplegia,  bed  bound nal  quadriplegia         follow  fs.  endo  d r millan     quita, resolving     c/c  aspiration. despite  peg      ct ca/p.  mild  goo,  r pl  effusion,  on iv lasix/  seen by  pulm    echo  pending/  d/c  plans/  pt is  calm pe r floor  team    dvt  ppx         pt  is  prior dbr/dni/  epr  n/home paper            49-year-old male     h/o  HTN/ HLD. DM ,   CVA with right sided hemiplegia, non verbal, dysphagia ,has  PEG tube, recently treated pneumonia with meropenem (8/27-9/2) brought in by EMS from [facility] for difficulty breathing and hypoxia 85%   per   nursing home , pt has   cpvid   QUITA      sob/  hypoxia,  from covid/ pna/  and  chf/ acute  diastolic     prior  h/o  pna's/  suspect  pt has   c/c  aspiration// pna/  cxr . infection  vx  fluid  overload       on   remdidvir/  decadron/   was  on  iv zosyn    per  id  dr raya bedoya,  no  sepsis,  and  no  need  for  ab     gi  eval.    QUITA.  f/p  by enrrique;l  d r ali      CVA,  non verbal, /  r hemiplegia,  bed  bound nal  quadriplegia         follow  fs.  endo  d r monty     quita, resolving     c/c  aspiration. despite  peg      ct ca/p.  mild  goo,  r pl  effusion,  on iv lasix.  fluid  overload. ?  acute diastolic  chf/   seen by  pulm/     echo  pending/  d/c  plans/  pt is  calm pe r floor  team    dvt  ppx         pt  is  prior dbr/dni/  epr  n/home paper

## 2024-09-11 NOTE — PROGRESS NOTE ADULT - ASSESSMENT
49-year-old male with past medical history of diabetes, hypertension, HLD. CVA with right sided hemiplegia, AF (now off AC 2/2 hx ICH), non verbal, dysphagia s/p PEG tube (last known exchange at [facility] 5/17/24 for 16 Fr balloon bumper G tube - [facility]IE[facility] chart review), recently treated pneumonia with meropenem (8/27-9/2) brought in by EMS from [facility] for difficulty breathing and hypoxia 85% RA, difficulty breathing per EMS and nursing home documentation.  Patient unable to provide additional history. Currently admitted and undergoing treatment for COVID-19 pneumonia.     s/p RRT called for hypoxia and distress 9/9 AM - suspected clogged G tube with return of previously administered bolus feed along with dyspnea/restlessness/anxiety    #dysphagia s/p PEG  bedside unclogging of PEG tube w/ endobrush 9/9/24 AM  9/9 AM AXR NOBGP +gas and stool throughout bowel, no pneumoperitoneum  9/9 CT CAP- mild GGO w/R>L pleural effusions. Mild SQ edema. no abnormality within abdomen/pelvis.  G tube in stomach  #constipation - improved    -PEG feeds  -will downgrade bowel regimen given increased stooling today   -continue PPI given pt on steroids and acute COVID infection    #COVID  -remdesivir, steroids per ID recs  -further care per primary team    #CKD w/ QUITA  -management per Renal    Discussed with Nursing, Medicine team    Jesús La PA-C  [facility] Non Teaching GI Service  Available on TEAMS Mon-Fri 8a-4p  After hours and weekend coverage [phone]

## 2024-09-11 NOTE — DISCHARGE NOTE PROVIDER - NSDCCPCAREPLAN_GEN_ALL_CORE_FT
PRINCIPAL DISCHARGE DIAGNOSIS  Diagnosis: Acute hypoxemic respiratory failure due to COVID-19  Assessment and Plan of Treatment: You tested positive for COVID 19.  You no longer require hospitalization.  Please restrict activities outside of your home except for getting medical care.  Do not go to work, school, or public areas.  Avoid using public transportation, ride-sharing, or taxis.  Separate yourself from other people and animals in your home.  Call ahead before visiting your doctor.  Wear a facemask when you are around other people. Cover your cough and sneezes.  Clean your hands often.  Avoid sharing personal household items.  Clean all frequently touched surfaces daily.        SECONDARY DISCHARGE DIAGNOSES  Diagnosis: HTN (hypertension)  Assessment and Plan of Treatment: Low salt diet  Activity as tolerated.  Take all medication as prescribed.  Follow up with your medical doctor for routine blood pressure monitoring at your next visit.  Notify your doctor if you have any of the following symptoms:   Dizziness, Lightheadedness, Blurry vision, Headache, Chest pain, Shortness of breath      Diagnosis: Stage 3 chronic kidney disease  Assessment and Plan of Treatment: Avoid taking (NSAIDs) - (ex: Ibuprofen, Advil, Celebrex, Naprosyn)  Avoid taking any nephrotoxic agents (can harm kidneys) - Intravenous contrast for diagnostic testing, combination cold medications.  Have all medications adjusted for your renal function by your Health Care Provider.  Blood pressure control is important.  Take all medication as prescribed.      Diagnosis: Diabetes mellitus  Assessment and Plan of Treatment: HgA1C this admission 5.2.  If you take oral diabetes medications, check your blood glucose two times a day.  If you take insulin, check your blood glucose before meals and at bedtime.  It's important not to skip any meals.  Keep a log of your blood glucose results and always take it with you to your doctor appointments.  Keep a list of your current medications including injectables and over the counter medications and bring this medication list with you to all your doctor appointments.  If you have not seen your opthalmologist this year call for appointment.  Check your feet daily for redness, sores, or openings. Do not self treat. If no improvement in two days call your primary care physician for an appointment.  Low blood sugar (hypoglycemia) is a blood sugar below 70mg/dl. Check your blood sugar if you feel signs/symptoms of hypoglycemia. If your blood sugar is below 70 take 15 grams of carbohydrates (ex 4 oz of apple juice, 3-4 glucosr tablets, or 4-6 oz of regular soda) wait 15 minutes and repeat blood sugar to make sure it comes up above 70.  If your blood sugar is above 70 and you are due for a meal, have a meal.  If you are not due for a meal have a snack.  This snack helps keeps your blood sugar at a safe range.

## 2024-09-11 NOTE — PROVIDER CONTACT NOTE (OTHER) - ACTION/TREATMENT ORDERED:
Draw labs with 0600 labs- okay to wait until it is safe for staff and when pt is not agitated and thrashing.
provider notified & aware via teams. provider replied pt is ordered "npo except meds". RN not comfortable giving pt meds by mouth as pt has hx of CVA & dysphagia. No new orders or interventions.

## 2024-09-11 NOTE — DISCHARGE NOTE PROVIDER - HOSPITAL COURSE
49-year-old male     h/o  HTN/ HLD. DM ,   CVA with right sided hemiplegia, non verbal, dysphagia ,has  PEG tube, recently treated pneumonia with meropenem (8/27-9/2) brought in by EMS from [facility] for difficulty breathing and hypoxia 85%   per   nursing home , pt has   cpvid   QUITA      sob/  hypoxia,  from covid/ pna/  and  chf/ acute  diastolic     prior  h/o  pna's/  suspect  pt has   c/c  aspiration// pna/  cxr . infection  vx  fluid  overload       on   remdidvir/  decadron/   was  on  iv zosyn    per  id  dr raya bedoya,  no  sepsis,  and  no  need  for  ab      seen by gi      QUITA.  f/p  by enrrique;l  d r ali      CVA,    minimally  verbal, /  r hemiplegia,  bed  bound .  functional   quadriplegia         follow  fs.  endo  d r millan     quita, resolving     c/c  aspiration. despite  peg      ct ca/p.  mild  goo,  r pl  effusion,  on   lasix/  for  probable new  acute  diastolic   chf   seen by  pulm    echo  pending/  d/c  plans/  pt is  calm pe r floor  team    dvt  ppx         pt  is  prior dbr/dni/  epr  n/home paper     49-year-old male     h/o  HTN/ HLD. DM ,   CVA with right sided hemiplegia, non verbal, dysphagia ,has  PEG tube, recently treated pneumonia with meropenem (8/27-9/2) brought in by EMS from [facility] for difficulty breathing and hypoxia 85%   per   nursing home , pt has   cpvid   QUITA      sob/  hypoxia,  from covid/ pna/  and  chf/ acute  diastolic     prior  h/o  pna's/  suspect  pt has   c/c  aspiration// pna/  cxr . infection  vx  fluid  overload       on   remdidvir/  decadron/   was  on  iv zosyn    per  id  dr raya bedoya,  no  sepsis,  and  no  need  for  ab      seen by gi      QUITA.  f/p  by enrrique;l  d r ali      CVA,    minimally  verbal, /  r hemiplegia,  bed  bound .  functional   quadriplegia         follow  fs.  endo  d r millan     quita, resolving     c/c  aspiration. despite  peg      ct ca/p.  mild  goo,  r pl  effusion,  on   lasix/  for  probable new  acute    systolic   chf   seen by  pulm.  ef  40    echo  pending/  d/c  plans/  pt is  calm pe r floor  team    dvt  ppx         pt  is  prior dbr/dni/  epr  n/home paper     49-year-old male     h/o  HTN/ HLD. DM ,   CVA with right sided hemiplegia, non verbal, dysphagia ,has  PEG tube, recently treated pneumonia with meropenem (8/27-9/2) brought in by EMS from [facility] for difficulty breathing and hypoxia 85%   per   nursing home , pt has   cpvid   QUITA      sob/  hypoxia,  from covid/ pna/  and  chf/ acute   systolic   chf    prior  h/o  pna's/  suspect  pt has   c/c  aspiration// pna/  cxr . infection  vx  fluid  overload       on   remdidvir/  decadron/   was  on  iv zosyn    per  id  dr raya bedoya,  no  sepsis,  and  no  need  for  ab      seen by gi      QUITA.  f/p  by enrrique;l  d r ali      CVA,    minimally  verbal, /  r hemiplegia,  bed  bound .  functional   quadriplegia         follow  fs.  endo  d r millan     quita, resolving     c/c  aspiration. despite  peg      ct ca/p.  mild  goo,  r pl  effusion,  on   lasix/  for  probable new  acute    systolic   chf   seen by  pulm.  ef  40      echo,  ef  40/   meds  adjuste d by card/   d/c  back  to  n/  home    dvt  ppx         pt  is  prior dbr/dni/  epr  n/home paper

## 2024-09-11 NOTE — PROGRESS NOTE ADULT - SUBJECTIVE AND OBJECTIVE BOX
CHIEF COMPLAINT: SOB    Interval Events: No acute events overnight. Weaned down to RA. Patient also much more interactive today able to converse with family in native langauge and a few words in english.    REVIEW OF SYSTEMS:  Constitutional: [ ] negative [ ] fevers [ ] chills [ ] weight loss [ ] weight gain  HEENT: [ ] negative [ ] dry eyes [ ] eye irritation [ ] postnasal drip [ ] nasal congestion  CV: [ ] negative  [ ] chest pain [ ] orthopnea [ ] palpitations [ ] murmur  Resp: [ ] negative [X] cough [ ] shortness of breath [ ] dyspnea [ ] wheezing [ ] sputum [ ] hemoptysis  GI: [ ] negative [ ] nausea [ ] vomiting [ ] diarrhea [ ] constipation [ ] abd pain [ ] dysphagia   : [ ] negative [ ] dysuria [ ] nocturia [ ] hematuria [ ] increased urinary frequency  Musculoskeletal: [ ] negative [ ] back pain [ ] myalgias [ ] arthralgias [ ] fracture  Skin: [ ] negative [ ] rash [ ] itch  Neurological: [ ] negative [ ] headache [ ] dizziness [ ] syncope [ ] weakness [ ] numbness  Psychiatric: [ ] negative [ ] anxiety [ ] depression  Endocrine: [ ] negative [ ] diabetes [ ] thyroid problem  Hematologic/Lymphatic: [ ] negative [ ] anemia [ ] bleeding problem  Allergic/Immunologic: [ ] negative [ ] itchy eyes [ ] nasal discharge [ ] hives [ ] angioedema  [X] All other systems negative  [ ] Unable to assess ROS because ________    OBJECTIVE:  ICU Vital Signs Last 24 Hrs  T(C): 36.5 (11 Sep 2024 05:13), Max: 37.1 (10 Sep 2024 13:03)  T(F): 97.7 (11 Sep 2024 05:13), Max: 98.8 (10 Sep 2024 13:03)  HR: 93 (11 Sep 2024 05:13) (89 - 97)  BP: 159/94 (11 Sep 2024 05:13) (145/77 - 174/98)  BP(mean): --  ABP: --  ABP(mean): --  RR: 18 (11 Sep 2024 05:13) (18 - 18)  SpO2: 96% (11 Sep 2024 05:13) (91% - 97%)    O2 Parameters below as of 11 Sep 2024 05:13  Patient On (Oxygen Delivery Method): room air              09-10 @ 07:01  -  09-11 @ 07:00  --------------------------------------------------------  IN: 0 mL / OUT: 0 mL / NET: 0 mL      CAPILLARY BLOOD GLUCOSE      POCT Blood Glucose.: 144 mg/dL (11 Sep 2024 05:36)      PHYSICAL EXAM:  General: NAD, resting comfortably  HEENT: EOMI, PERRLA  Neck: Supple  Respiratory: CTAB  Cardiovascular: S1S2, RRR  Abdomen: Soft, NTND, PEG +  Extremities: No edema  Skin: Warm and dry  Neurological: Able to answer yes or no      HOSPITAL MEDICATIONS:  MEDICATIONS  (STANDING):  albuterol    90 MICROgram(s) HFA Inhaler 2 Puff(s) Inhalation every 8 hours  albuterol/ipratropium for Nebulization 3 milliLiter(s) Nebulizer every 6 hours  amLODIPine   Tablet 10 milliGRAM(s) Oral daily  aspirin  chewable 81 milliGRAM(s) Oral daily  atorvastatin 40 milliGRAM(s) Oral at bedtime  dexAMETHasone  Injectable 6 milliGRAM(s) IV Push daily  dextrose 5%. 1000 milliLiter(s) (50 mL/Hr) IV Continuous <Continuous>  dextrose 5%. 1000 milliLiter(s) (100 mL/Hr) IV Continuous <Continuous>  dextrose 50% Injectable 12.5 Gram(s) IV Push once  dextrose 50% Injectable 25 Gram(s) IV Push once  dextrose 50% Injectable 25 Gram(s) IV Push once  furosemide    Tablet 20 milliGRAM(s) Oral daily  glucagon  Injectable 1 milliGRAM(s) IntraMuscular once  heparin   Injectable 5000 Unit(s) SubCutaneous every 12 hours  hydrALAZINE 25 milliGRAM(s) Oral three times a day  insulin lispro (ADMELOG) corrective regimen sliding scale   SubCutaneous every 6 hours  isosorbide   dinitrate Tablet (ISORDIL) 20 milliGRAM(s) Oral three times a day  labetalol 300 milliGRAM(s) Oral two times a day  melatonin 3 milliGRAM(s) Oral at bedtime  pancrelipase (VIOKACE) for Occluded enteral feeding tubes 1 Tablet(s) Enteral Tube once  pantoprazole   Suspension 40 milliGRAM(s) Enteral Tube daily  polyethylene glycol 3350 17 Gram(s) Oral two times a day  QUEtiapine 50 milliGRAM(s) Oral at bedtime  QUEtiapine 25 milliGRAM(s) Oral daily  remdesivir  IVPB   IV Intermittent   remdesivir  IVPB 100 milliGRAM(s) IV Intermittent every 24 hours  senna 2 Tablet(s) Oral at bedtime  sodium bicarbonate 650 milliGRAM(s) Oral three times a day    MEDICATIONS  (PRN):  acetaminophen   Oral Liquid .. 650 milliGRAM(s) Oral every 6 hours PRN Temp greater or equal to 38C (100.4F), Moderate Pain (4 - 6)  dextrose Oral Gel 15 Gram(s) Oral once PRN Blood Glucose LESS THAN 70 milliGRAM(s)/deciliter  haloperidol    Injectable 2 milliGRAM(s) IntraMuscular once PRN Agitation      LABS:                        8.8    8.38  )-----------( 273      ( 11 Sep 2024 08:10 )             27.0     Hgb Trend: 8.8<--, 8.5<--, 8.3<--, 8.5<--  09-11    145  |  112<H>  |  52<H>  ----------------------------<  125<H>  4.6   |  19<L>  |  2.78<H>    Ca    8.8      11 Sep 2024 08:10    TPro  6.6  /  Alb  3.5  /  TBili  0.8  /  DBili  0.2  /  AST  21  /  ALT  17  /  AlkPhos  98  09-11    Creatinine Trend: 2.78<--, 2.76<--, 2.81<--, 3.16<--, 3.27<--  PT/INR - ( 11 Sep 2024 08:10 )   PT: 11.3 sec;   INR: 1.03 ratio           Urinalysis Basic - ( 11 Sep 2024 08:10 )    Color: x / Appearance: x / SG: x / pH: x  Gluc: 125 mg/dL / Ketone: x  / Bili: x / Urobili: x   Blood: x / Protein: x / Nitrite: x   Leuk Esterase: x / RBC: x / WBC x   Sq Epi: x / Non Sq Epi: x / Bacteria: x            MICROBIOLOGY:       RADIOLOGY:  [ ] Reviewed and interpreted by me    PULMONARY FUNCTION TESTS:    EKG:

## 2024-09-11 NOTE — DISCHARGE NOTE PROVIDER - CARE PROVIDER_API CALL
Yisel Prasad  Internal Medicine  [address] [place] [address]  Phone: [phone]  Fax: [phone]  Follow Up Time:

## 2024-09-12 LAB
GLUCOSE BLDC GLUCOMTR-MCNC: 120 MG/DL — HIGH (ref 70–99)
GLUCOSE BLDC GLUCOMTR-MCNC: 140 MG/DL — HIGH (ref 70–99)
GLUCOSE BLDC GLUCOMTR-MCNC: 261 MG/DL — HIGH (ref 70–99)
GLUCOSE BLDC GLUCOMTR-MCNC: 272 MG/DL — HIGH (ref 70–99)

## 2024-09-12 PROCEDURE — 93308 TTE F-UP OR LMTD: CPT | Mod: 26

## 2024-09-12 PROCEDURE — 99232 SBSQ HOSP IP/OBS MODERATE 35: CPT

## 2024-09-12 PROCEDURE — 93325 DOPPLER ECHO COLOR FLOW MAPG: CPT | Mod: 26

## 2024-09-12 RX ORDER — EPOETIN ALFA 3000 [IU]/ML
10000 SOLUTION INTRAVENOUS; SUBCUTANEOUS ONCE
Refills: 0 | Status: COMPLETED | OUTPATIENT
Start: 2024-09-12 | End: 2024-09-12

## 2024-09-12 RX ORDER — CARVEDILOL 6.25 MG/1
12.5 TABLET ORAL EVERY 12 HOURS
Refills: 0 | Status: DISCONTINUED | OUTPATIENT
Start: 2024-09-12 | End: 2024-09-18

## 2024-09-12 RX ADMIN — Medication 5000 UNIT(S): at 17:13

## 2024-09-12 RX ADMIN — Medication 25 MILLIGRAM(S): at 10:55

## 2024-09-12 RX ADMIN — Medication 2 PUFF(S): at 14:45

## 2024-09-12 RX ADMIN — Medication 5000 UNIT(S): at 06:10

## 2024-09-12 RX ADMIN — Medication 81 MILLIGRAM(S): at 11:55

## 2024-09-12 RX ADMIN — Medication 20 MILLIGRAM(S): at 06:10

## 2024-09-12 RX ADMIN — IPRATROPIUM BROMIDE AND ALBUTEROL SULFATE 3 MILLILITER(S): .5; 3 SOLUTION RESPIRATORY (INHALATION) at 11:55

## 2024-09-12 RX ADMIN — Medication 3 MILLIGRAM(S): at 22:03

## 2024-09-12 RX ADMIN — IPRATROPIUM BROMIDE AND ALBUTEROL SULFATE 3 MILLILITER(S): .5; 3 SOLUTION RESPIRATORY (INHALATION) at 06:09

## 2024-09-12 RX ADMIN — Medication 20 MILLIGRAM(S): at 22:03

## 2024-09-12 RX ADMIN — CARVEDILOL 12.5 MILLIGRAM(S): 6.25 TABLET ORAL at 17:13

## 2024-09-12 RX ADMIN — Medication 300 MILLIGRAM(S): at 06:09

## 2024-09-12 RX ADMIN — Medication 50 MILLIGRAM(S): at 22:03

## 2024-09-12 RX ADMIN — Medication 3: at 17:44

## 2024-09-12 RX ADMIN — Medication 20 MILLIGRAM(S): at 14:44

## 2024-09-12 RX ADMIN — Medication 25 MILLIGRAM(S): at 06:10

## 2024-09-12 RX ADMIN — Medication 40 MILLIGRAM(S): at 22:03

## 2024-09-12 RX ADMIN — Medication 25 MILLIGRAM(S): at 14:44

## 2024-09-12 RX ADMIN — IPRATROPIUM BROMIDE AND ALBUTEROL SULFATE 3 MILLILITER(S): .5; 3 SOLUTION RESPIRATORY (INHALATION) at 17:13

## 2024-09-12 RX ADMIN — SODIUM BICARBONATE 650 MILLIGRAM(S): 84 INJECTION, SOLUTION INTRAVENOUS at 14:46

## 2024-09-12 RX ADMIN — Medication 40 MILLIGRAM(S): at 11:55

## 2024-09-12 RX ADMIN — POLYETHYLENE GLYCOL 3350 17 GRAM(S): 17 POWDER, FOR SOLUTION ORAL at 14:45

## 2024-09-12 RX ADMIN — AMLODIPINE BESYLATE 10 MILLIGRAM(S): 10 TABLET ORAL at 06:09

## 2024-09-12 RX ADMIN — IPRATROPIUM BROMIDE AND ALBUTEROL SULFATE 3 MILLILITER(S): .5; 3 SOLUTION RESPIRATORY (INHALATION) at 00:28

## 2024-09-12 RX ADMIN — SODIUM BICARBONATE 650 MILLIGRAM(S): 84 INJECTION, SOLUTION INTRAVENOUS at 06:10

## 2024-09-12 RX ADMIN — Medication 3: at 12:54

## 2024-09-12 RX ADMIN — Medication 2 PUFF(S): at 22:03

## 2024-09-12 RX ADMIN — SODIUM BICARBONATE 650 MILLIGRAM(S): 84 INJECTION, SOLUTION INTRAVENOUS at 22:03

## 2024-09-12 RX ADMIN — EPOETIN ALFA 10000 UNIT(S): 3000 SOLUTION INTRAVENOUS; SUBCUTANEOUS at 15:23

## 2024-09-12 RX ADMIN — Medication 25 MILLIGRAM(S): at 22:03

## 2024-09-12 RX ADMIN — Medication 2 PUFF(S): at 06:10

## 2024-09-12 RX ADMIN — Medication 6 MILLIGRAM(S): at 06:09

## 2024-09-12 NOTE — PROGRESS NOTE ADULT - ASSESSMENT
49-year-old male     h/o  HTN/ HLD. DM ,   CVA with right sided hemiplegia, non verbal, dysphagia ,has  PEG tube, recently treated pneumonia with meropenem (8/27-9/2) brought in by EMS from [facility] for difficulty breathing and hypoxia 85%   per   nursing home , pt has   cpvid   QUITA      sob/  hypoxia,  from covid/ pna/  and  chf/ acute  diastolic     prior  h/o  pna's/  suspect  pt has   c/c  aspiration// pna/  cxr . infection  vx  fluid  overload       on   remdidvir/  decadron/   was  on  iv zosyn    per  id  dr raya bedoya,  no  sepsis,  and  no  need  for  ab     gi  eval.    QUITA.  f/p  by enrrique;l  d r ali      CVA,  non verbal, /  r hemiplegia,  bed  bound nal  quadriplegia         follow  fs.  endo  d r monty     quita, resolving     c/c  aspiration. despite  peg      ct ca/p.  mild  goo,  r pl  effusion,  on iv lasix.  fluid  overload. ?  acute diastolic  chf/   seen by  pulm/     echo   still pending/   d/c plnas    dvt  ppx         pt  is  prior dbr/dni/  epr  n/home paper

## 2024-09-12 NOTE — PROGRESS NOTE ADULT - SUBJECTIVE AND OBJECTIVE BOX
[facility] [phone]        Patient seen and examined in bed.  He was the same and seems to be at baseline        MEDICATIONS  (STANDING):  albuterol    90 MICROgram(s) HFA Inhaler 2 Puff(s) Inhalation every 8 hours  albuterol/ipratropium for Nebulization 3 milliLiter(s) Nebulizer every 6 hours  amLODIPine   Tablet 10 milliGRAM(s) Oral daily  aspirin  chewable 81 milliGRAM(s) Oral daily  atorvastatin 40 milliGRAM(s) Oral at bedtime  carvedilol 12.5 milliGRAM(s) Oral every 12 hours  dexAMETHasone     Tablet 6 milliGRAM(s) Oral daily  dextrose 5%. 1000 milliLiter(s) (50 mL/Hr) IV Continuous <Continuous>  dextrose 5%. 1000 milliLiter(s) (100 mL/Hr) IV Continuous <Continuous>  dextrose 50% Injectable 12.5 Gram(s) IV Push once  dextrose 50% Injectable 25 Gram(s) IV Push once  dextrose 50% Injectable 25 Gram(s) IV Push once  furosemide    Tablet 20 milliGRAM(s) Oral daily  glucagon  Injectable 1 milliGRAM(s) IntraMuscular once  heparin   Injectable 5000 Unit(s) SubCutaneous every 12 hours  hydrALAZINE 25 milliGRAM(s) Oral three times a day  insulin lispro (ADMELOG) corrective regimen sliding scale   SubCutaneous every 6 hours  isosorbide   dinitrate Tablet (ISORDIL) 20 milliGRAM(s) Oral three times a day  melatonin 3 milliGRAM(s) Oral at bedtime  pancrelipase (VIOKACE) for Occluded enteral feeding tubes 1 Tablet(s) Enteral Tube once  pantoprazole   Suspension 40 milliGRAM(s) Enteral Tube daily  polyethylene glycol 3350 17 Gram(s) Oral daily  QUEtiapine 25 milliGRAM(s) Oral daily  QUEtiapine 50 milliGRAM(s) Oral at bedtime  sodium bicarbonate 650 milliGRAM(s) Oral three times a day      VITAL:  T(C): , Max: 37.4 (09-12-24 @ 08:27)  T(F): , Max: 99.3 (09-12-24 @ 08:27)  HR: 85 (09-12-24 @ 08:27)  BP: 149/78 (09-12-24 @ 08:27)  BP(mean): --  RR: 18 (09-12-24 @ 08:27)  SpO2: 97% (09-12-24 @ 08:27)  Wt(kg): --    I and O's:    09-11 @ 07:01  -  09-12 @ 07:00  --------------------------------------------------------  IN: 0 mL / OUT: 0 mL / NET: 0 mL    09-12 @ 07:01  -  09-12 @ 11:05  --------------------------------------------------------  IN: 0 mL / OUT: 0 mL / NET: 0 mL          PHYSICAL EXAM:    Constitutional: NAD  Neck:  No JVD  Respiratory: CTAB/L  Cardiovascular: S1 and S2  Gastrointestinal: BS+, soft, + peg   Extremities: No peripheral edema  Neurological: right sided weakness   Psychiatric:    : No Naranjo  Skin: No rashes  Access: Not applicable    LABS:                        8.8    8.38  )-----------( 273      ( 11 Sep 2024 08:10 )             27.0     09-11    145  |  112<H>  |  52<H>  ----------------------------<  125<H>  4.6   |  19<L>  |  2.78<H>    Ca    8.8      11 Sep 2024 08:10    TPro  6.6  /  Alb  3.5  /  TBili  0.8  /  DBili  0.2  /  AST  21  /  ALT  17  /  AlkPhos  98  09-11          Urine Studies:  Urinalysis Basic - ( 11 Sep 2024 08:10 )    Color: x / Appearance: x / SG: x / pH: x  Gluc: 125 mg/dL / Ketone: x  / Bili: x / Urobili: x   Blood: x / Protein: x / Nitrite: x   Leuk Esterase: x / RBC: x / WBC x   Sq Epi: x / Non Sq Epi: x / Bacteria: x            RADIOLOGY & ADDITIONAL STUDIES:      < from: CT Chest No Cont (09.09.24 @ 17:49) >    ACC: [id number] EXAM:  CT ABDOMEN AND PELVIS   ORDERED BY: REY WILLIE     ACC: [id number] EXAM:  CT CHEST   ORDERED BY:  VALENTE PACKER     PROCEDURE DATE:  09/09/2024          INTERPRETATION:  CLINICAL INFORMATION: New Covid. Episode of desaturation   and distress this morning. Recent hospitalization for pneumonia.    COMPARISON: None.    CONTRAST/COMPLICATIONS:  IV Contrast: NONE  Oral Contrast: NONE  Complications: None reported at time of study completion    PROCEDURE:  CT of the Chest, Abdomen and Pelvis was performed.  Sagittal and coronal reformats were performed.    FINDINGS:  CHEST:  LUNGS AND LARGE AIRWAYS: Patent central airways. No pulmonary nodules.   Mild central predominant groundglass opacity. Passive atelectasis at the   lung bases, right greater than left.  PLEURA: Moderate right pleural effusion. Small left pleural effusion.  VESSELS: Within normal limits.  HEART: Heart size is normal. No pericardial effusion.  MEDIASTINUM AND ANNIE: No lymphadenopathy.  CHEST WALL ANDLOWER NECK: Within normal limits.    ABDOMEN AND PELVIS:  LIVER: Within normal limits.  BILE DUCTS: Normal caliber.  GALLBLADDER: Within normal limits.  SPLEEN: Within normal limits.  PANCREAS: Within normal limits.  ADRENALS: Within normal limits.  KIDNEYS/URETERS: Within normal limits. No hydronephrosis.    BLADDER: Within normal limits.  REPRODUCTIVE ORGANS:    BOWEL: No bowel obstruction. Appendix is normal. Gastrostomy tube in the   stomach.  PERITONEUM/RETROPERITONEUM: Within normal limits.  VESSELS: Atherosclerotic calcifications.  LYMPH NODES: No lymphadenopathy.  ABDOMINAL WALL: Mild subcutaneous edema.  BONES: Degenerative changes.    IMPRESSION:  Mild bilateral groundglass opacity with right greater than left pleural   effusions.    Mild subcutaneous edema.    No evidence of acute abnormality in the abdomen and pelvis.      --- End of Report ---           ANSON BARNES MD; Resident Radiologist  This document has been electronically signed.  TOSHA DEUTSCH MD; Attending Radiologist  This document has been electronically signed. Sep 10 2024  7:51AM    < end of copied text >

## 2024-09-12 NOTE — PROGRESS NOTE ADULT - SUBJECTIVE AND OBJECTIVE BOX
date of service: 09-12-24 @ 07:06  afberile  REVIEW OF SYSTEMS:  CONSTITUTIONAL: No fever,  no  weight loss  ENT:  No  tinnitus,   no   vertigo  NECK: No pain or stiffness  RESPIRATORY: No cough, wheezing, chills or hemoptysis;    No Shortness of Breath  CARDIOVASCULAR: No chest pain, palpitations, dizziness  GASTROINTESTINAL: No abdominal or epigastric pain. No nausea, vomiting, or hematemesis; No diarrhea  No melena or hematochezia.  GENITOURINARY: No dysuria, frequency, hematuria, or incontinence  NEUROLOGICAL: No headaches  SKIN: No itching,  no   rash  LYMPH Nodes: No enlarged glands  ENDOCRINE: No heat or cold intolerance  MUSCULOSKELETAL: No joint pain or swelling  PSYCHIATRIC: No depression, anxiety  HEME/LYMPH: No easy bruising, or bleeding gums  ALLERGY AND IMMUNOLOGIC: No hives or eczema	    MEDICATIONS  (STANDING):  albuterol    90 MICROgram(s) HFA Inhaler 2 Puff(s) Inhalation every 8 hours  albuterol/ipratropium for Nebulization 3 milliLiter(s) Nebulizer every 6 hours  amLODIPine   Tablet 10 milliGRAM(s) Oral daily  aspirin  chewable 81 milliGRAM(s) Oral daily  atorvastatin 40 milliGRAM(s) Oral at bedtime  dexAMETHasone     Tablet 6 milliGRAM(s) Oral daily  dextrose 5%. 1000 milliLiter(s) (50 mL/Hr) IV Continuous <Continuous>  dextrose 5%. 1000 milliLiter(s) (100 mL/Hr) IV Continuous <Continuous>  dextrose 50% Injectable 12.5 Gram(s) IV Push once  dextrose 50% Injectable 25 Gram(s) IV Push once  dextrose 50% Injectable 25 Gram(s) IV Push once  furosemide    Tablet 20 milliGRAM(s) Oral daily  glucagon  Injectable 1 milliGRAM(s) IntraMuscular once  heparin   Injectable 5000 Unit(s) SubCutaneous every 12 hours  hydrALAZINE 25 milliGRAM(s) Oral three times a day  insulin lispro (ADMELOG) corrective regimen sliding scale   SubCutaneous every 6 hours  isosorbide   dinitrate Tablet (ISORDIL) 20 milliGRAM(s) Oral three times a day  labetalol 300 milliGRAM(s) Oral two times a day  melatonin 3 milliGRAM(s) Oral at bedtime  pancrelipase (VIOKACE) for Occluded enteral feeding tubes 1 Tablet(s) Enteral Tube once  pantoprazole   Suspension 40 milliGRAM(s) Enteral Tube daily  polyethylene glycol 3350 17 Gram(s) Oral daily  QUEtiapine 50 milliGRAM(s) Oral at bedtime  QUEtiapine 25 milliGRAM(s) Oral daily  sodium bicarbonate 650 milliGRAM(s) Oral three times a day    MEDICATIONS  (PRN):  acetaminophen   Oral Liquid .. 650 milliGRAM(s) Oral every 6 hours PRN Temp greater or equal to 38C (100.4F), Moderate Pain (4 - 6)  dextrose Oral Gel 15 Gram(s) Oral once PRN Blood Glucose LESS THAN 70 milliGRAM(s)/deciliter  haloperidol    Injectable 2 milliGRAM(s) IntraMuscular once PRN Agitation      Vital Signs Last 24 Hrs  T(C): 37 (12 Sep 2024 05:35), Max: 37 (12 Sep 2024 05:35)  T(F): 98.6 (12 Sep 2024 05:35), Max: 98.6 (12 Sep 2024 05:35)  HR: 86 (12 Sep 2024 05:35) (84 - 95)  BP: 160/87 (12 Sep 2024 05:35) (135/97 - 175/95)  BP(mean): --  RR: 18 (12 Sep 2024 05:35) (18 - 18)  SpO2: 98% (12 Sep 2024 05:35) (96% - 98%)    Parameters below as of 12 Sep 2024 05:35  Patient On (Oxygen Delivery Method): room air      CAPILLARY BLOOD GLUCOSE      POCT Blood Glucose.: 140 mg/dL (12 Sep 2024 06:12)  POCT Blood Glucose.: 120 mg/dL (11 Sep 2024 23:59)  POCT Blood Glucose.: 125 mg/dL (11 Sep 2024 21:09)  POCT Blood Glucose.: 200 mg/dL (11 Sep 2024 16:49)  POCT Blood Glucose.: 219 mg/dL (11 Sep 2024 15:32)  POCT Blood Glucose.: 191 mg/dL (11 Sep 2024 13:42)    I&O's Summary    11 Sep 2024 07:01  -  12 Sep 2024 07:00  --------------------------------------------------------  IN: 0 mL / OUT: 0 mL / NET: 0 mL          Appearance: Normal	  HEENT:   Normal oral mucosa, PERRL, EOMI	  Lymphatic: No lymphadenopathy  Cardiovascular: Normal S1 S2, No JVD  Respiratory: Lungs clear to auscultation	  Gastrointestinal:  Soft, Non-tender, + BS	  Skin: No rash, No ecchymoses	  Extremities:     LABS:                        8.8    8.38  )-----------( 273      ( 11 Sep 2024 08:10 )             27.0     09-11    145  |  112<H>  |  52<H>  ----------------------------<  125<H>  4.6   |  19<L>  |  2.78<H>    Ca    8.8      11 Sep 2024 08:10    TPro  6.6  /  Alb  3.5  /  TBili  0.8  /  DBili  0.2  /  AST  21  /  ALT  17  /  AlkPhos  98  09-11    PT/INR - ( 11 Sep 2024 08:10 )   PT: 11.3 sec;   INR: 1.03 ratio               Urinalysis Basic - ( 11 Sep 2024 08:10 )    Color: x / Appearance: x / SG: x / pH: x  Gluc: 125 mg/dL / Ketone: x  / Bili: x / Urobili: x   Blood: x / Protein: x / Nitrite: x   Leuk Esterase: x / RBC: x / WBC x   Sq Epi: x / Non Sq Epi: x / Bacteria: x              Thyroid Stimulating Hormone, Serum: 2.73 uIU/mL (09-10 @ 07:22)          Consultant(s) Notes Reviewed:      Care Discussed with Consultants/Other Providers:

## 2024-09-12 NOTE — PROGRESS NOTE ADULT - ASSESSMENT
49-year-old male     h/o  HTN/ HLD. DM ,   CVA with right sided hemiplegia, non verbal, dysphagia ,has  PEG tube, recently treated pneumonia with meropenem (8/27-9/2) brought in by EMS from [facility] for difficulty breathing and hypoxia 85%   per   nursing home , pt has   cpvid   QUITA      sob/  hypoxia,  from covid/ pna/  and  chf/ acute  diastolic     prior  h/o  pna's/  suspect  pt has   c/c  aspiration// pna/  cxr . infection  vx  fluid  overload       on   remdidvir/  decadron/   was  on  iv zosyn    per  id  dr raya bedoya,  no  sepsis,  and  no  need  for  ab     gi  eval.    QUITA.  f/p  by enrrique;l  d r ali      CVA,  non verbal, /  r hemiplegia,  bed  bound nal  quadriplegia         follow  fs.  endo  d r millan     quita, resolving     c/c  aspiration. despite  peg      ct ca/p.  mild  goo,  r pl  effusion,  on iv lasix.  fluid  overload. ?  acute diastolic  chf/   seen by  pulm/     echo   still pending/   d/c plnas    dvt  ppx /  discusse d with  team        pt  is  prior dbr/dni/  epr  n/home paper            49-year-old male     h/o  HTN/ HLD. DM ,   CVA with right sided hemiplegia, non verbal, dysphagia ,has  PEG tube, recently treated pneumonia with meropenem (8/27-9/2) brought in by EMS from [facility] for difficulty breathing and hypoxia 85%   per   nursing home , pt has   cpvid   QUITA      sob/  hypoxia,  from covid/ pna/  and  chf/ acute  diastolic     prior  h/o  pna's/  suspect  pt has   c/c  aspiration// pna/  cxr . infection  vx  fluid  overload       on   remdidvir/  decadron/   was  on  iv zosyn    per  id  dr raya bedoya,  no  sepsis,  and  no  need  for  ab     gi  eval.    QUITA.  f/p  by enrrique;l  d r ali      CVA,  non verbal, /  r hemiplegia,  bed  bound nal  quadriplegia         follow  fs.  endo  d r monty     quita, resolving     c/c  aspiration. despite  peg      ct ca/p.  mild  goo,  r pl  effusion,  on iv lasix.  fluid  overload. ?  acute   systolic   chf/   seen by  pulm/     echo  ,  ef  41,     rx  plan pe r card , on coreg.  mlple  bp  meds    dvt  ppx /   d/c  plans.  when  cleraed  by  card.    discussed  with  team        pt  is  prior dbr/dni/  epr  n/home paper

## 2024-09-12 NOTE — PROGRESS NOTE ADULT - SUBJECTIVE AND OBJECTIVE BOX
Date of Service: 09-12-24 @ 07:37           CARDIOLOGY     PROGRESS  NOTE   ________________________________________________    CHIEF COMPLAINT:Patient is a 49y old  Male who presents with a chief complaint of SOB (12 Sep 2024 07:05)  comfortable  	  REVIEW OF SYSTEMS:  CONSTITUTIONAL: No fever, weight loss, or fatigue  EYES: No eye pain, visual disturbances, or discharge  ENT:  No difficulty hearing, tinnitus, vertigo; No sinus or throat pain  NECK: No pain or stiffness  RESPIRATORY: No cough, wheezing, chills or hemoptysis; decrease  Shortness of Breath  CARDIOVASCULAR: No chest pain, palpitations, passing out, dizziness, or leg swelling  GASTROINTESTINAL: No abdominal or epigastric pain. No nausea, vomiting, or hematemesis; No diarrhea or constipation. No melena or hematochezia.  GENITOURINARY: No dysuria, frequency, hematuria, or incontinence  NEUROLOGICAL: No headaches, memory loss, loss of strength, numbness, or tremors  SKIN: No itching, burning, rashes, or lesions   LYMPH Nodes: No enlarged glands  ENDOCRINE: No heat or cold intolerance; No hair loss  MUSCULOSKELETAL: No joint pain or swelling; No muscle, back, or extremity pain  PSYCHIATRIC: No depression, anxiety, mood swings, or difficulty sleeping  HEME/LYMPH: No easy bruising, or bleeding gums  ALLERGY AND IMMUNOLOGIC: No hives or eczema	    [ ] All others negative	  [x ] Unable to obtain    PHYSICAL EXAM:  T(C): 37 (09-12-24 @ 05:35), Max: 37 (09-12-24 @ 05:35)  HR: 86 (09-12-24 @ 05:35) (84 - 95)  BP: 160/87 (09-12-24 @ 05:35) (135/97 - 175/95)  RR: 18 (09-12-24 @ 05:35) (18 - 18)  SpO2: 98% (09-12-24 @ 05:35) (96% - 98%)  Wt(kg): --  I&O's Summary    11 Sep 2024 07:01  -  12 Sep 2024 07:00  --------------------------------------------------------  IN: 0 mL / OUT: 0 mL / NET: 0 mL        Appearance: Normal	  HEENT:   Normal oral mucosa, PERRL, EOMI	  Lymphatic: No lymphadenopathy  Cardiovascular: Normal S1 S2, No JVD,+murmurs, No edema  Respiratory: +rhonchi  Gastrointestinal:  Soft, Non-tender, + BS	  Skin: No rashes, No ecchymoses, No cyanosis	  Neurologic: Non-focal  Extremities: Normal range of motion, No clubbing, cyanosis or edema  Vascular: Peripheral pulses palpable 2+ bilaterally    MEDICATIONS  (STANDING):  albuterol    90 MICROgram(s) HFA Inhaler 2 Puff(s) Inhalation every 8 hours  albuterol/ipratropium for Nebulization 3 milliLiter(s) Nebulizer every 6 hours  amLODIPine   Tablet 10 milliGRAM(s) Oral daily  aspirin  chewable 81 milliGRAM(s) Oral daily  atorvastatin 40 milliGRAM(s) Oral at bedtime  dexAMETHasone     Tablet 6 milliGRAM(s) Oral daily  dextrose 5%. 1000 milliLiter(s) (50 mL/Hr) IV Continuous <Continuous>  dextrose 5%. 1000 milliLiter(s) (100 mL/Hr) IV Continuous <Continuous>  dextrose 50% Injectable 12.5 Gram(s) IV Push once  dextrose 50% Injectable 25 Gram(s) IV Push once  dextrose 50% Injectable 25 Gram(s) IV Push once  furosemide    Tablet 20 milliGRAM(s) Oral daily  glucagon  Injectable 1 milliGRAM(s) IntraMuscular once  heparin   Injectable 5000 Unit(s) SubCutaneous every 12 hours  hydrALAZINE 25 milliGRAM(s) Oral three times a day  insulin lispro (ADMELOG) corrective regimen sliding scale   SubCutaneous every 6 hours  isosorbide   dinitrate Tablet (ISORDIL) 20 milliGRAM(s) Oral three times a day  labetalol 300 milliGRAM(s) Oral two times a day  melatonin 3 milliGRAM(s) Oral at bedtime  pancrelipase (VIOKACE) for Occluded enteral feeding tubes 1 Tablet(s) Enteral Tube once  pantoprazole   Suspension 40 milliGRAM(s) Enteral Tube daily  polyethylene glycol 3350 17 Gram(s) Oral daily  QUEtiapine 50 milliGRAM(s) Oral at bedtime  QUEtiapine 25 milliGRAM(s) Oral daily  sodium bicarbonate 650 milliGRAM(s) Oral three times a day      TELEMETRY: 	    ECG:  	  RADIOLOGY:  OTHER: 	  	  LABS:	 	    CARDIAC MARKERS:                            8.8    8.38  )-----------( 273      ( 11 Sep 2024 08:10 )             27.0     09-11    145  |  112<H>  |  52<H>  ----------------------------<  125<H>  4.6   |  19<L>  |  2.78<H>    Ca    8.8      11 Sep 2024 08:10    TPro  6.6  /  Alb  3.5  /  TBili  0.8  /  DBili  0.2  /  AST  21  /  ALT  17  /  AlkPhos  98  09-11    proBNP:   Lipid Profile:   HgA1c:   TSH: Thyroid Stimulating Hormone, Serum: 2.73 uIU/mL (09-10 @ 07:22)    PT/INR - ( 11 Sep 2024 08:10 )   PT: 11.3 sec;   INR: 1.03 ratio          Assessment and plan  ---------------------------  49-year-old male with past medical history of diabetes, hypertension, HLD. CVA with right sided hemiplegia, non verbal, dysphagia on PEG tube, recently treated pneumonia with meropenem (8/27-9/2) brought in by EMS from [facility] for difficulty breathing and hypoxia 85% RA, difficulty breathing per EMS and nursing home documentation.  Patient unable to provide additional history.     pt with sig medical and cardiac hx with increasing sob, COVID +, recently treated with pneumoniae  check d dimer if elevated , vq scan to r/o PE  echo  ID eval RDV increase creatinine renal eval  pt with hx of PAF ?AC will discus with Dr Prasad  CAD/ ASHD continue cardiac meds will adjust  increase bp will adjust meds, may increase hydralazine dose  ID noted   CKD continue to observe , on sodium bicarb  renal ultrasound  dvt prophylaxis, check d dimer noted  awaiting echo  chest ct with moderate R  pleural effusion, awaiting echo, may need therapeutic and diagnostic thoracentesis  peg feeding as per medicine  may consider to dc ivf, free water via the peg  bp is better controlled with increase hydralazine dose  may add diuretics will discuss with renal  still awaiting ECHO, may consider pulmonary eval  check lytes  still with increase bp, dc labetalol start on coreg 12.5 mg bid

## 2024-09-12 NOTE — PROGRESS NOTE ADULT - SUBJECTIVE AND OBJECTIVE BOX
INTERVAL HPI/OVERNIGHT EVENTS:  multiple BMs yesterday - bowel regimen downgraded to Miralax daily  1 BM overnight, none thus far today  tolerating PEG feeds at goal rate  no abdominal pain, nausea or vomiting  breathing well on room air  remains on isolation precautions for COVID    MEDICATIONS  (STANDING):  albuterol    90 MICROgram(s) HFA Inhaler 2 Puff(s) Inhalation every 8 hours  albuterol/ipratropium for Nebulization 3 milliLiter(s) Nebulizer every 6 hours  amLODIPine   Tablet 10 milliGRAM(s) Oral daily  aspirin  chewable 81 milliGRAM(s) Oral daily  atorvastatin 40 milliGRAM(s) Oral at bedtime  carvedilol 12.5 milliGRAM(s) Oral every 12 hours  dexAMETHasone     Tablet 6 milliGRAM(s) Oral daily  dextrose 5%. 1000 milliLiter(s) (50 mL/Hr) IV Continuous <Continuous>  dextrose 5%. 1000 milliLiter(s) (100 mL/Hr) IV Continuous <Continuous>  dextrose 50% Injectable 12.5 Gram(s) IV Push once  dextrose 50% Injectable 25 Gram(s) IV Push once  dextrose 50% Injectable 25 Gram(s) IV Push once  epoetin lorenzo (EPOGEN) Injectable 03666 Unit(s) SubCutaneous once  furosemide    Tablet 20 milliGRAM(s) Oral daily  glucagon  Injectable 1 milliGRAM(s) IntraMuscular once  heparin   Injectable 5000 Unit(s) SubCutaneous every 12 hours  hydrALAZINE 25 milliGRAM(s) Oral three times a day  insulin lispro (ADMELOG) corrective regimen sliding scale   SubCutaneous every 6 hours  isosorbide   dinitrate Tablet (ISORDIL) 20 milliGRAM(s) Oral three times a day  melatonin 3 milliGRAM(s) Oral at bedtime  pancrelipase (VIOKACE) for Occluded enteral feeding tubes 1 Tablet(s) Enteral Tube once  pantoprazole   Suspension 40 milliGRAM(s) Enteral Tube daily  polyethylene glycol 3350 17 Gram(s) Oral daily  QUEtiapine 25 milliGRAM(s) Oral daily  QUEtiapine 50 milliGRAM(s) Oral at bedtime  sodium bicarbonate 650 milliGRAM(s) Oral three times a day    MEDICATIONS  (PRN):  acetaminophen   Oral Liquid .. 650 milliGRAM(s) Oral every 6 hours PRN Temp greater or equal to 38C (100.4F), Moderate Pain (4 - 6)  dextrose Oral Gel 15 Gram(s) Oral once PRN Blood Glucose LESS THAN 70 milliGRAM(s)/deciliter  haloperidol    Injectable 2 milliGRAM(s) IntraMuscular once PRN Agitation      Allergies  No Known Allergies      Review of Systems:  limited, see HPI    Vital Signs Last 24 Hrs  T(C): 37.4 (12 Sep 2024 08:27), Max: 37.4 (12 Sep 2024 08:27)  T(F): 99.3 (12 Sep 2024 08:27), Max: 99.3 (12 Sep 2024 08:27)  HR: 85 (12 Sep 2024 08:27) (84 - 95)  BP: 149/78 (12 Sep 2024 08:27) (135/97 - 175/95)  BP(mean): --  RR: 18 (12 Sep 2024 08:27) (18 - 18)  SpO2: 97% (12 Sep 2024 08:27) (96% - 98%)    Parameters below as of 12 Sep 2024 08:27  Patient On (Oxygen Delivery Method): room air    PHYSICAL EXAM:    Constitutional: So  male alert and verbally responsive  resting in bed  Neck: no JVS  Respiratory: clear, no retractions, no wheeze  Cardiovascular: S1S2 regular  Gastrointestinal: soft soft, obese NT +BS +G tube (16 Fr balloon bumper) Bumper at 4cm pam at skin level - spins easily 360 degrees.     Extremities: +muscle wasting, no edema +right elisa. s/p Right great toe amputation  Vascular: +pulses bl  Neurological: alert, responsive +right elisa  Psychiatric: calm, cooperative    LABS:                        8.8    8.38  )-----------( 273      ( 11 Sep 2024 08:10 )             27.0     09-11    145  |  112<H>  |  52<H>  ----------------------------<  125<H>  4.6   |  19<L>  |  2.78<H>    Ca    8.8      11 Sep 2024 08:10    TPro  6.6  /  Alb  3.5  /  TBili  0.8  /  DBili  0.2  /  AST  21  /  ALT  17  /  AlkPhos  98  09-11    PT/INR - ( 11 Sep 2024 08:10 )   PT: 11.3 sec;   INR: 1.03 ratio           Urinalysis Basic - ( 11 Sep 2024 08:10 )    Color: x / Appearance: x / SG: x / pH: x  Gluc: 125 mg/dL / Ketone: x  / Bili: x / Urobili: x   Blood: x / Protein: x / Nitrite: x   Leuk Esterase: x / RBC: x / WBC x   Sq Epi: x / Non Sq Epi: x / Bacteria: x      LIVER FUNCTIONS - ( 11 Sep 2024 08:10 )  Alb: 3.5 g/dL / Pro: 6.6 g/dL / ALK PHOS: 98 U/L / ALT: 17 U/L / AST: 21 U/L / GGT: x             RADIOLOGY & ADDITIONAL TESTS:

## 2024-09-12 NOTE — PROGRESS NOTE ADULT - ASSESSMENT
49-year-old male with past medical history of diabetes, hypertension, HLD. CVA with right sided hemiplegia, AF (now off AC 2/2 hx ICH), non verbal, dysphagia s/p PEG tube (last known exchange at [facility] 5/17/24 for 16 Fr balloon bumper G tube - [facility]IE[facility] chart review), recently treated pneumonia with meropenem (8/27-9/2) brought in by EMS from [facility] for difficulty breathing and hypoxia 85% RA, difficulty breathing per EMS and nursing home documentation.  Patient unable to provide additional history. Currently admitted and undergoing treatment for COVID-19 pneumonia.     s/p RRT called for hypoxia and distress 9/9 AM - suspected clogged G tube with return of previously administered bolus feed along with dyspnea/restlessness/anxiety    #dysphagia s/p PEG  bedside unclogging of PEG tube w/ endobrush 9/9/24 AM  9/9 AM AXR NOBGP +gas and stool throughout bowel, no pneumoperitoneum  9/9 CT CAP- mild GGO w/R>L pleural effusions. Mild SQ edema. no abnormality within abdomen/pelvis.  G tube in stomach  #constipation - resolved  increased stooling 9/11 - bowel regimen downgraded    -PEG feeds  -continue Miralax daily, monitor stooling  -continue PPI given pt on steroids and acute COVID infection    #COVID  -remdesivir, steroids per ID recs  -further care per primary team    #CKD w/ QUITA  -management per Renal    Discussed with Nursing, Medicine team  Stable GI issues at this time  Will Sign off care. Please recall prn for GI concerns.  Thank You.      Jesús La PA-C  [facility] Non Teaching GI Service  Available on TEAMS Mon-Fri 8a-4p  After hours and weekend coverage [phone]

## 2024-09-12 NOTE — PROGRESS NOTE ADULT - SUBJECTIVE AND OBJECTIVE BOX
date of service: 09-12-24 @ 09:17  afberile  REVIEW OF SYSTEMS:  CONSTITUTIONAL: No fever,  no  weight loss  ENT:  No  tinnitus,   no   vertigo  NECK: No pain or stiffness  RESPIRATORY: No cough, wheezing, chills or hemoptysis;    No Shortness of Breath  CARDIOVASCULAR: No chest pain, palpitations, dizziness  GASTROINTESTINAL: No abdominal or epigastric pain. No nausea, vomiting, or hematemesis; No diarrhea  No melena or hematochezia.  GENITOURINARY: No dysuria, frequency, hematuria, or incontinence  NEUROLOGICAL: No headaches  SKIN: No itching,  no   rash  LYMPH Nodes: No enlarged glands  ENDOCRINE: No heat or cold intolerance  MUSCULOSKELETAL: No joint pain or swelling  PSYCHIATRIC: No depression, anxiety  HEME/LYMPH: No easy bruising, or bleeding gums  ALLERGY AND IMMUNOLOGIC: No hives or eczema	    MEDICATIONS  (STANDING):  albuterol    90 MICROgram(s) HFA Inhaler 2 Puff(s) Inhalation every 8 hours  albuterol/ipratropium for Nebulization 3 milliLiter(s) Nebulizer every 6 hours  amLODIPine   Tablet 10 milliGRAM(s) Oral daily  aspirin  chewable 81 milliGRAM(s) Oral daily  atorvastatin 40 milliGRAM(s) Oral at bedtime  carvedilol 12.5 milliGRAM(s) Oral every 12 hours  dexAMETHasone     Tablet 6 milliGRAM(s) Oral daily  dextrose 5%. 1000 milliLiter(s) (50 mL/Hr) IV Continuous <Continuous>  dextrose 5%. 1000 milliLiter(s) (100 mL/Hr) IV Continuous <Continuous>  dextrose 50% Injectable 12.5 Gram(s) IV Push once  dextrose 50% Injectable 25 Gram(s) IV Push once  dextrose 50% Injectable 25 Gram(s) IV Push once  furosemide    Tablet 20 milliGRAM(s) Oral daily  glucagon  Injectable 1 milliGRAM(s) IntraMuscular once  heparin   Injectable 5000 Unit(s) SubCutaneous every 12 hours  hydrALAZINE 25 milliGRAM(s) Oral three times a day  insulin lispro (ADMELOG) corrective regimen sliding scale   SubCutaneous every 6 hours  isosorbide   dinitrate Tablet (ISORDIL) 20 milliGRAM(s) Oral three times a day  melatonin 3 milliGRAM(s) Oral at bedtime  pancrelipase (VIOKACE) for Occluded enteral feeding tubes 1 Tablet(s) Enteral Tube once  pantoprazole   Suspension 40 milliGRAM(s) Enteral Tube daily  polyethylene glycol 3350 17 Gram(s) Oral daily  QUEtiapine 50 milliGRAM(s) Oral at bedtime  QUEtiapine 25 milliGRAM(s) Oral daily  sodium bicarbonate 650 milliGRAM(s) Oral three times a day    MEDICATIONS  (PRN):  acetaminophen   Oral Liquid .. 650 milliGRAM(s) Oral every 6 hours PRN Temp greater or equal to 38C (100.4F), Moderate Pain (4 - 6)  dextrose Oral Gel 15 Gram(s) Oral once PRN Blood Glucose LESS THAN 70 milliGRAM(s)/deciliter  haloperidol    Injectable 2 milliGRAM(s) IntraMuscular once PRN Agitation      Vital Signs Last 24 Hrs  T(C): 37.4 (12 Sep 2024 08:27), Max: 37.4 (12 Sep 2024 08:27)  T(F): 99.3 (12 Sep 2024 08:27), Max: 99.3 (12 Sep 2024 08:27)  HR: 85 (12 Sep 2024 08:27) (84 - 95)  BP: 149/78 (12 Sep 2024 08:27) (135/97 - 175/95)  BP(mean): --  RR: 18 (12 Sep 2024 08:27) (18 - 18)  SpO2: 97% (12 Sep 2024 08:27) (96% - 98%)    Parameters below as of 12 Sep 2024 08:27  Patient On (Oxygen Delivery Method): room air      CAPILLARY BLOOD GLUCOSE      POCT Blood Glucose.: 140 mg/dL (12 Sep 2024 06:12)  POCT Blood Glucose.: 120 mg/dL (11 Sep 2024 23:59)  POCT Blood Glucose.: 125 mg/dL (11 Sep 2024 21:09)  POCT Blood Glucose.: 200 mg/dL (11 Sep 2024 16:49)  POCT Blood Glucose.: 219 mg/dL (11 Sep 2024 15:32)  POCT Blood Glucose.: 191 mg/dL (11 Sep 2024 13:42)    I&O's Summary    11 Sep 2024 07:01  -  12 Sep 2024 07:00  --------------------------------------------------------  IN: 0 mL / OUT: 0 mL / NET: 0 mL    12 Sep 2024 07:01  -  12 Sep 2024 09:17  --------------------------------------------------------  IN: 0 mL / OUT: 0 mL / NET: 0 mL          Appearance: Normal	  HEENT:   Normal oral mucosa, PERRL, EOMI	  Lymphatic: No lymphadenopathy  Cardiovascular: Normal S1 S2, No JVD  Respiratory: Lungs clear to auscultation	  Gastrointestinal:  Soft, Non-tender, + BS	  Skin: No rash, No ecchymoses	  Extremities:     LABS:                        8.8    8.38  )-----------( 273      ( 11 Sep 2024 08:10 )             27.0     09-11    145  |  112<H>  |  52<H>  ----------------------------<  125<H>  4.6   |  19<L>  |  2.78<H>    Ca    8.8      11 Sep 2024 08:10    TPro  6.6  /  Alb  3.5  /  TBili  0.8  /  DBili  0.2  /  AST  21  /  ALT  17  /  AlkPhos  98  09-11    PT/INR - ( 11 Sep 2024 08:10 )   PT: 11.3 sec;   INR: 1.03 ratio               Urinalysis Basic - ( 11 Sep 2024 08:10 )    Color: x / Appearance: x / SG: x / pH: x  Gluc: 125 mg/dL / Ketone: x  / Bili: x / Urobili: x   Blood: x / Protein: x / Nitrite: x   Leuk Esterase: x / RBC: x / WBC x   Sq Epi: x / Non Sq Epi: x / Bacteria: x              Thyroid Stimulating Hormone, Serum: 2.73 uIU/mL (09-10 @ 07:22)          Consultant(s) Notes Reviewed:      Care Discussed with Consultants/Other Providers:

## 2024-09-12 NOTE — PROGRESS NOTE ADULT - ASSESSMENT
49-year-old male h/o  HTN/ HLD. DM ,   CVA with right sided hemiplegia, non verbal, dysphagia ,has  PEG tube, recently treated pneumonia brought in by EMS from [facility] for difficulty breathing and hypoxia   per   nursing home , pt has   covid   QUITA    Assessment  Hyperglycemia: 49y Male with hyperglycemias, being started on tube feeds via PEG tube, A1c stable, not on DM meds, started on sliding scale now.  Glucose improving.   COVID+: on medications, on isolation , monitored.  HTN: on antihypertensive medications, monitored, asymptomatic.    Discussed plan and management wit Dr Mau León MD  Cell: [phone]  Office: [phone]               49-year-old male h/o  HTN/ HLD. DM ,   CVA with right sided hemiplegia, non verbal, dysphagia ,has  PEG tube, recently treated pneumonia brought in by EMS from [facility] for difficulty breathing and hypoxia   per   nursing home , pt has   covid   QUITA      Assessment  Hyperglycemia: 49y Male with hyperglycemias, being started on tube feeds via PEG tube, A1c stable, not on DM meds, started on sliding scale now.  Glucose improving.   COVID+: on medications, on isolation , monitored.  HTN: on antihypertensive medications, monitored, asymptomatic.    Discussed plan and management wit Dr Mau León MD  Cell: [phone]  Office: [phone]

## 2024-09-12 NOTE — PROGRESS NOTE ADULT - ASSESSMENT
49-year-old male with past medical history of diabetes, hypertension, HLD. CVA with right sided hemiplegia, non verbal, dysphagia on PEG tube, recently treated pneumonia with meropenem (8/27-9/2) brought in by EMS from [facility] for difficulty breathing and hypoxia 85% RA, difficulty breathing per EMS and nursing home documentation. found to have COVID and QUITA vs advanced CKD. Nephrology consulted for QUITA vs CKD    Suspect QUITA on CKD vs advanced CKD  - Nephrotic syndrome   -QUITA 2/2 prerenal azotemia? vs chronicity        COVID virus  -on RDV    dysphagia  -s/p PEG    CVA  -R sided hemiplegia    RECOMMEND:  -  lasix to po now    - bicarb 650 TID through peg.    - PEG feeds to be restarted today   -Epogen 39680 unit x 1   -Dose Rx for CrCl 20-25mL/min;  Awaiting labs    - GIven overall state I suspect ARB + SGLT2 would be warranted here when the creatinine is stable - This can be started at his rehab as well and the creatinine will need to be trended       Sayed [facility]   [phone]

## 2024-09-13 LAB
ANION GAP SERPL CALC-SCNC: 12 MMOL/L — SIGNIFICANT CHANGE UP (ref 5–17)
BUN SERPL-MCNC: 56 MG/DL — HIGH (ref 7–23)
CALCIUM SERPL-MCNC: 8.4 MG/DL — SIGNIFICANT CHANGE UP (ref 8.4–10.5)
CHLORIDE SERPL-SCNC: 115 MMOL/L — HIGH (ref 96–108)
CO2 SERPL-SCNC: 22 MMOL/L — SIGNIFICANT CHANGE UP (ref 22–31)
CREAT SERPL-MCNC: 2.38 MG/DL — HIGH (ref 0.5–1.3)
CULTURE RESULTS: SIGNIFICANT CHANGE UP
CULTURE RESULTS: SIGNIFICANT CHANGE UP
EGFR: 33 ML/MIN/1.73M2 — LOW
GLUCOSE BLDC GLUCOMTR-MCNC: 157 MG/DL — HIGH (ref 70–99)
GLUCOSE BLDC GLUCOMTR-MCNC: 182 MG/DL — HIGH (ref 70–99)
GLUCOSE BLDC GLUCOMTR-MCNC: 240 MG/DL — HIGH (ref 70–99)
GLUCOSE BLDC GLUCOMTR-MCNC: 290 MG/DL — HIGH (ref 70–99)
GLUCOSE BLDC GLUCOMTR-MCNC: 306 MG/DL — HIGH (ref 70–99)
GLUCOSE SERPL-MCNC: 165 MG/DL — HIGH (ref 70–99)
POTASSIUM SERPL-MCNC: 4.4 MMOL/L — SIGNIFICANT CHANGE UP (ref 3.5–5.3)
POTASSIUM SERPL-SCNC: 4.4 MMOL/L — SIGNIFICANT CHANGE UP (ref 3.5–5.3)
SODIUM SERPL-SCNC: 149 MMOL/L — HIGH (ref 135–145)
SPECIMEN SOURCE: SIGNIFICANT CHANGE UP
SPECIMEN SOURCE: SIGNIFICANT CHANGE UP

## 2024-09-13 PROCEDURE — 99231 SBSQ HOSP IP/OBS SF/LOW 25: CPT

## 2024-09-13 RX ORDER — INSULIN GLARGINE 100 [IU]/ML
8 INJECTION, SOLUTION SUBCUTANEOUS AT BEDTIME
Refills: 0 | Status: DISCONTINUED | OUTPATIENT
Start: 2024-09-13 | End: 2024-09-16

## 2024-09-13 RX ADMIN — Medication 2 PUFF(S): at 05:41

## 2024-09-13 RX ADMIN — IPRATROPIUM BROMIDE AND ALBUTEROL SULFATE 3 MILLILITER(S): .5; 3 SOLUTION RESPIRATORY (INHALATION) at 05:40

## 2024-09-13 RX ADMIN — ACETAMINOPHEN 650 MILLIGRAM(S): 325 TABLET ORAL at 00:15

## 2024-09-13 RX ADMIN — Medication 20 MILLIGRAM(S): at 16:33

## 2024-09-13 RX ADMIN — AMLODIPINE BESYLATE 10 MILLIGRAM(S): 10 TABLET ORAL at 05:40

## 2024-09-13 RX ADMIN — Medication 20 MILLIGRAM(S): at 05:40

## 2024-09-13 RX ADMIN — ACETAMINOPHEN 650 MILLIGRAM(S): 325 TABLET ORAL at 00:45

## 2024-09-13 RX ADMIN — Medication 3 MILLIGRAM(S): at 21:50

## 2024-09-13 RX ADMIN — Medication 25 MILLIGRAM(S): at 13:10

## 2024-09-13 RX ADMIN — POLYETHYLENE GLYCOL 3350 17 GRAM(S): 17 POWDER, FOR SOLUTION ORAL at 13:10

## 2024-09-13 RX ADMIN — Medication 81 MILLIGRAM(S): at 13:09

## 2024-09-13 RX ADMIN — Medication 40 MILLIGRAM(S): at 13:09

## 2024-09-13 RX ADMIN — CARVEDILOL 12.5 MILLIGRAM(S): 6.25 TABLET ORAL at 05:40

## 2024-09-13 RX ADMIN — IPRATROPIUM BROMIDE AND ALBUTEROL SULFATE 3 MILLILITER(S): .5; 3 SOLUTION RESPIRATORY (INHALATION) at 13:09

## 2024-09-13 RX ADMIN — Medication 4: at 17:47

## 2024-09-13 RX ADMIN — Medication 3: at 13:09

## 2024-09-13 RX ADMIN — SODIUM BICARBONATE 650 MILLIGRAM(S): 84 INJECTION, SOLUTION INTRAVENOUS at 05:39

## 2024-09-13 RX ADMIN — Medication 6 MILLIGRAM(S): at 05:39

## 2024-09-13 RX ADMIN — Medication 20 MILLIGRAM(S): at 21:50

## 2024-09-13 RX ADMIN — Medication 1: at 00:15

## 2024-09-13 RX ADMIN — SODIUM BICARBONATE 650 MILLIGRAM(S): 84 INJECTION, SOLUTION INTRAVENOUS at 21:51

## 2024-09-13 RX ADMIN — CARVEDILOL 12.5 MILLIGRAM(S): 6.25 TABLET ORAL at 17:48

## 2024-09-13 RX ADMIN — IPRATROPIUM BROMIDE AND ALBUTEROL SULFATE 3 MILLILITER(S): .5; 3 SOLUTION RESPIRATORY (INHALATION) at 17:48

## 2024-09-13 RX ADMIN — Medication 25 MILLIGRAM(S): at 05:40

## 2024-09-13 RX ADMIN — Medication 5000 UNIT(S): at 17:47

## 2024-09-13 RX ADMIN — Medication 5000 UNIT(S): at 05:41

## 2024-09-13 RX ADMIN — Medication 40 MILLIGRAM(S): at 21:51

## 2024-09-13 RX ADMIN — Medication 25 MILLIGRAM(S): at 10:18

## 2024-09-13 RX ADMIN — Medication 2 PUFF(S): at 21:58

## 2024-09-13 RX ADMIN — Medication 25 MILLIGRAM(S): at 21:51

## 2024-09-13 RX ADMIN — Medication 2 PUFF(S): at 13:11

## 2024-09-13 RX ADMIN — INSULIN GLARGINE 8 UNIT(S): 100 INJECTION, SOLUTION SUBCUTANEOUS at 21:58

## 2024-09-13 RX ADMIN — SODIUM BICARBONATE 650 MILLIGRAM(S): 84 INJECTION, SOLUTION INTRAVENOUS at 13:10

## 2024-09-13 RX ADMIN — IPRATROPIUM BROMIDE AND ALBUTEROL SULFATE 3 MILLILITER(S): .5; 3 SOLUTION RESPIRATORY (INHALATION) at 00:15

## 2024-09-13 RX ADMIN — Medication 50 MILLIGRAM(S): at 21:50

## 2024-09-13 RX ADMIN — Medication 1: at 06:44

## 2024-09-13 NOTE — PROGRESS NOTE ADULT - SUBJECTIVE AND OBJECTIVE BOX
date of service: 09-13-24 @ 09:25  afberile  REVIEW OF SYSTEMS:  CONSTITUTIONAL: No fever,  no  weight loss  ENT:  No  tinnitus,   no   vertigo  NECK: No pain or stiffness  RESPIRATORY: No cough, wheezing, chills or hemoptysis;    No Shortness of Breath  CARDIOVASCULAR: No chest pain, palpitations, dizziness  GASTROINTESTINAL: No abdominal or epigastric pain. No nausea, vomiting, or hematemesis; No diarrhea  No melena or hematochezia.  GENITOURINARY: No dysuria, frequency, hematuria, or incontinence  NEUROLOGICAL: No headaches  SKIN: No itching,  no   rash  LYMPH Nodes: No enlarged glands  ENDOCRINE: No heat or cold intolerance  MUSCULOSKELETAL: No joint pain or swelling  PSYCHIATRIC: No depression, anxiety  HEME/LYMPH: No easy bruising, or bleeding gums  ALLERGY AND IMMUNOLOGIC: No hives or eczema	    MEDICATIONS  (STANDING):  albuterol    90 MICROgram(s) HFA Inhaler 2 Puff(s) Inhalation every 8 hours  albuterol/ipratropium for Nebulization 3 milliLiter(s) Nebulizer every 6 hours  amLODIPine   Tablet 10 milliGRAM(s) Oral daily  aspirin  chewable 81 milliGRAM(s) Oral daily  atorvastatin 40 milliGRAM(s) Oral at bedtime  carvedilol 12.5 milliGRAM(s) Oral every 12 hours  dexAMETHasone     Tablet 6 milliGRAM(s) Oral daily  dextrose 5%. 1000 milliLiter(s) (50 mL/Hr) IV Continuous <Continuous>  dextrose 5%. 1000 milliLiter(s) (100 mL/Hr) IV Continuous <Continuous>  dextrose 50% Injectable 12.5 Gram(s) IV Push once  dextrose 50% Injectable 25 Gram(s) IV Push once  dextrose 50% Injectable 25 Gram(s) IV Push once  furosemide    Tablet 20 milliGRAM(s) Oral daily  glucagon  Injectable 1 milliGRAM(s) IntraMuscular once  heparin   Injectable 5000 Unit(s) SubCutaneous every 12 hours  hydrALAZINE 25 milliGRAM(s) Oral three times a day  insulin lispro (ADMELOG) corrective regimen sliding scale   SubCutaneous every 6 hours  isosorbide   dinitrate Tablet (ISORDIL) 20 milliGRAM(s) Oral three times a day  melatonin 3 milliGRAM(s) Oral at bedtime  pancrelipase (VIOKACE) for Occluded enteral feeding tubes 1 Tablet(s) Enteral Tube once  pantoprazole   Suspension 40 milliGRAM(s) Enteral Tube daily  polyethylene glycol 3350 17 Gram(s) Oral daily  QUEtiapine 50 milliGRAM(s) Oral at bedtime  QUEtiapine 25 milliGRAM(s) Oral daily  sodium bicarbonate 650 milliGRAM(s) Oral three times a day    MEDICATIONS  (PRN):  acetaminophen   Oral Liquid .. 650 milliGRAM(s) Oral every 6 hours PRN Temp greater or equal to 38C (100.4F), Moderate Pain (4 - 6)  dextrose Oral Gel 15 Gram(s) Oral once PRN Blood Glucose LESS THAN 70 milliGRAM(s)/deciliter  haloperidol    Injectable 2 milliGRAM(s) IntraMuscular once PRN Agitation      Vital Signs Last 24 Hrs  T(C): 36.7 (13 Sep 2024 08:01), Max: 37.3 (12 Sep 2024 16:23)  T(F): 98.1 (13 Sep 2024 08:01), Max: 99.2 (12 Sep 2024 16:23)  HR: 75 (13 Sep 2024 08:01) (70 - 90)  BP: 115/76 (13 Sep 2024 08:01) (115/76 - 159/85)  BP(mean): --  RR: 18 (13 Sep 2024 08:01) (18 - 18)  SpO2: 94% (13 Sep 2024 08:01) (94% - 97%)    Parameters below as of 13 Sep 2024 08:01  Patient On (Oxygen Delivery Method): room air      CAPILLARY BLOOD GLUCOSE      POCT Blood Glucose.: 157 mg/dL (13 Sep 2024 06:24)  POCT Blood Glucose.: 182 mg/dL (13 Sep 2024 00:09)  POCT Blood Glucose.: 272 mg/dL (12 Sep 2024 17:10)  POCT Blood Glucose.: 261 mg/dL (12 Sep 2024 12:11)    I&O's Summary    12 Sep 2024 07:01  -  13 Sep 2024 07:00  --------------------------------------------------------  IN: 237 mL / OUT: 0 mL / NET: 237 mL          Appearance: Normal	  HEENT:   Normal oral mucosa, PERRL, EOMI	  Lymphatic: No lymphadenopathy  Cardiovascular: Normal S1 S2, No JVD  Respiratory: Lungs clear to auscultation	  Gastrointestinal:  Soft, Non-tender, + BS	  Skin: No rash, No ecchymoses	  Extremities:     LABS:    09-13    149<H>  |  115<H>  |  56<H>  ----------------------------<  165<H>  4.4   |  22  |  2.38<H>    Ca    8.4      13 Sep 2024 07:19            Urinalysis Basic - ( 13 Sep 2024 07:19 )    Color: x / Appearance: x / SG: x / pH: x  Gluc: 165 mg/dL / Ketone: x  / Bili: x / Urobili: x   Blood: x / Protein: x / Nitrite: x   Leuk Esterase: x / RBC: x / WBC x   Sq Epi: x / Non Sq Epi: x / Bacteria: x              Thyroid Stimulating Hormone, Serum: 2.73 uIU/mL (09-10 @ 07:22)          Consultant(s) Notes Reviewed:      Care Discussed with Consultants/Other Providers:

## 2024-09-13 NOTE — PROGRESS NOTE ADULT - SUBJECTIVE AND OBJECTIVE BOX
Follow Up:  covid    Interval History/ROS:  Overnight: No acute events.  Patient remains afebrile.  Otherwise hemodynamically stable on room air.  Latest labs show no leukocytosis, anemia 8.8/27, BMP with elevated creatinine to 2.38, improving.    Patient seen examined at bedside.  Appears comfortable.  No distress.  Allergies  No Known Allergies        ANTIMICROBIALS:      OTHER MEDS:  MEDICATIONS  (STANDING):  acetaminophen   Oral Liquid .. 650 every 6 hours PRN  albuterol    90 MICROgram(s) HFA Inhaler 2 every 8 hours  albuterol/ipratropium for Nebulization 3 every 6 hours  amLODIPine   Tablet 10 daily  aspirin  chewable 81 daily  atorvastatin 40 at bedtime  carvedilol 12.5 every 12 hours  dexAMETHasone     Tablet 6 daily  dextrose 50% Injectable 12.5 once  dextrose 50% Injectable 25 once  dextrose 50% Injectable 25 once  dextrose Oral Gel 15 once PRN  furosemide    Tablet 20 daily  glucagon  Injectable 1 once  haloperidol    Injectable 2 once PRN  heparin   Injectable 5000 every 12 hours  hydrALAZINE 25 three times a day  insulin lispro (ADMELOG) corrective regimen sliding scale  every 6 hours  isosorbide   dinitrate Tablet (ISORDIL) 20 three times a day  melatonin 3 at bedtime  pancrelipase (VIOKACE) for Occluded enteral feeding tubes 1 once  pantoprazole   Suspension 40 daily  polyethylene glycol 3350 17 daily  QUEtiapine 25 daily  QUEtiapine 50 at bedtime      Vital Signs Last 24 Hrs  T(C): 36.7 (13 Sep 2024 11:53), Max: 36.9 (12 Sep 2024 20:33)  T(F): 98 (13 Sep 2024 11:53), Max: 98.4 (12 Sep 2024 20:33)  HR: 79 (13 Sep 2024 11:53) (70 - 80)  BP: 124/77 (13 Sep 2024 11:53) (115/76 - 158/79)  BP(mean): --  RR: 18 (13 Sep 2024 11:53) (18 - 18)  SpO2: 94% (13 Sep 2024 11:53) (94% - 97%)    Parameters below as of 13 Sep 2024 11:53  Patient On (Oxygen Delivery Method): room air        Constitutional: no distress, nonverbal  HEAD/EYES: anicteric, no conjunctival injection  ENT:  supple, no thrush  Cardiovascular:   normal S1, S2, no murmur, no edema  Respiratory:  clear BS bilaterally, no wheezes, no rales  GI:  soft, non-tender, normal bowel sounds  :  no taylor, no CVA tenderness  Musculoskeletal:  no synovitis, normal ROM  Skin:  no rash, no erythema, no phlebitis  Heme/Onc: no lymphadenopathy                   09-13    149<H>  |  115<H>  |  56<H>  ----------------------------<  165<H>  4.4   |  22  |  2.38<H>    Ca    8.4      13 Sep 2024 07:19        Urinalysis Basic - ( 13 Sep 2024 07:19 )    Color: x / Appearance: x / SG: x / pH: x  Gluc: 165 mg/dL / Ketone: x  / Bili: x / Urobili: x   Blood: x / Protein: x / Nitrite: x   Leuk Esterase: x / RBC: x / WBC x   Sq Epi: x / Non Sq Epi: x / Bacteria: x        MICROBIOLOGY:  v                  RADIOLOGY:  Imaging reviewed

## 2024-09-13 NOTE — PROGRESS NOTE ADULT - SUBJECTIVE AND OBJECTIVE BOX
Date of Service: 09-13-24 @ 07:49           CARDIOLOGY     PROGRESS  NOTE   ________________________________________________    CHIEF COMPLAINT:Patient is a 49y old  Male who presents with a chief complaint of SOB (12 Sep 2024 15:02)  comfortable  	  REVIEW OF SYSTEMS:  CONSTITUTIONAL: No fever, weight loss, or fatigue  EYES: No eye pain, visual disturbances, or discharge  ENT:  No difficulty hearing, tinnitus, vertigo; No sinus or throat pain  NECK: No pain or stiffness  RESPIRATORY: No cough, wheezing, chills or hemoptysis; No Shortness of Breath  CARDIOVASCULAR: No chest pain, palpitations, passing out, dizziness, or leg swelling  GASTROINTESTINAL: No abdominal or epigastric pain. No nausea, vomiting, or hematemesis; No diarrhea or constipation. No melena or hematochezia.  GENITOURINARY: No dysuria, frequency, hematuria, or incontinence  NEUROLOGICAL: No headaches, memory loss, loss of strength, numbness, or tremors  SKIN: No itching, burning, rashes, or lesions   LYMPH Nodes: No enlarged glands  ENDOCRINE: No heat or cold intolerance; No hair loss  MUSCULOSKELETAL: No joint pain or swelling; No muscle, back, or extremity pain  PSYCHIATRIC: No depression, anxiety, mood swings, or difficulty sleeping  HEME/LYMPH: No easy bruising, or bleeding gums  ALLERGY AND IMMUNOLOGIC: No hives or eczema	    [ ] All others negative	  [ x] Unable to obtain    PHYSICAL EXAM:  T(C): 36.9 (09-13-24 @ 05:12), Max: 37.4 (09-12-24 @ 08:27)  HR: 70 (09-13-24 @ 05:12) (70 - 90)  BP: 144/76 (09-13-24 @ 05:12) (144/76 - 159/85)  RR: 18 (09-13-24 @ 05:12) (18 - 18)  SpO2: 96% (09-13-24 @ 05:12) (94% - 97%)  Wt(kg): --  I&O's Summary    12 Sep 2024 07:01  -  13 Sep 2024 07:00  --------------------------------------------------------  IN: 237 mL / OUT: 0 mL / NET: 237 mL        Appearance: Normal	  HEENT:   Normal oral mucosa, PERRL, EOMI	  Lymphatic: No lymphadenopathy  Cardiovascular: Normal S1 S2, No JVD, + murmurs, No edema  Respiratory: rhonchi  Psychiatry: dementia  Gastrointestinal:  Soft, Non-tender, + BS	  Skin: No rashes, No ecchymoses, No cyanosis	  Neurologic: can not asses  Extremities: Normal range of motion, No clubbing, cyanosis or edema  Vascular: Peripheral pulses palpable 2+ bilaterally    MEDICATIONS  (STANDING):  albuterol    90 MICROgram(s) HFA Inhaler 2 Puff(s) Inhalation every 8 hours  albuterol/ipratropium for Nebulization 3 milliLiter(s) Nebulizer every 6 hours  amLODIPine   Tablet 10 milliGRAM(s) Oral daily  aspirin  chewable 81 milliGRAM(s) Oral daily  atorvastatin 40 milliGRAM(s) Oral at bedtime  carvedilol 12.5 milliGRAM(s) Oral every 12 hours  dexAMETHasone     Tablet 6 milliGRAM(s) Oral daily  dextrose 5%. 1000 milliLiter(s) (50 mL/Hr) IV Continuous <Continuous>  dextrose 5%. 1000 milliLiter(s) (100 mL/Hr) IV Continuous <Continuous>  dextrose 50% Injectable 12.5 Gram(s) IV Push once  dextrose 50% Injectable 25 Gram(s) IV Push once  dextrose 50% Injectable 25 Gram(s) IV Push once  furosemide    Tablet 20 milliGRAM(s) Oral daily  glucagon  Injectable 1 milliGRAM(s) IntraMuscular once  heparin   Injectable 5000 Unit(s) SubCutaneous every 12 hours  hydrALAZINE 25 milliGRAM(s) Oral three times a day  insulin lispro (ADMELOG) corrective regimen sliding scale   SubCutaneous every 6 hours  isosorbide   dinitrate Tablet (ISORDIL) 20 milliGRAM(s) Oral three times a day  melatonin 3 milliGRAM(s) Oral at bedtime  pancrelipase (VIOKACE) for Occluded enteral feeding tubes 1 Tablet(s) Enteral Tube once  pantoprazole   Suspension 40 milliGRAM(s) Enteral Tube daily  polyethylene glycol 3350 17 Gram(s) Oral daily  QUEtiapine 25 milliGRAM(s) Oral daily  QUEtiapine 50 milliGRAM(s) Oral at bedtime  sodium bicarbonate 650 milliGRAM(s) Oral three times a day      TELEMETRY: 	    ECG:  	  RADIOLOGY:  OTHER: 	  	  LABS:	 	    CARDIAC MARKERS:                          8.8    8.38  )-----------( 273      ( 11 Sep 2024 08:10 )             27.0     09-11    145  |  112<H>  |  52<H>  ----------------------------<  125<H>  4.6   |  19<L>  |  2.78<H>    Ca    8.8      11 Sep 2024 08:10    TPro  6.6  /  Alb  3.5  /  TBili  0.8  /  DBili  0.2  /  AST  21  /  ALT  17  /  AlkPhos  98  09-11    proBNP:   Lipid Profile:   HgA1c:   TSH: Thyroid Stimulating Hormone, Serum: 2.73 uIU/mL (09-10 @ 07:22)    PT/INR - ( 11 Sep 2024 08:10 )   PT: 11.3 sec;   INR: 1.03 ratio         < from: TTE W or WO Ultrasound Enhancing Agent (09.12.24 @ 06:47) >   1. Limited TTE performed to assess LV function.   2. Left ventricular cavity is moderately dilated. Left ventricular systolic function is mildly decreased with an ejection fraction of 40 % by Bullard's method of disks. Global left ventricular hypokinesis.   3. Trace pericardial effusion.   4. No prior echocardiogram is available for comparison.    < from: CT Chest No Cont (09.09.24 @ 17:49) >  Mild bilateral groundglass opacity with right greater than left pleural   effusions.    Mild subcutaneous edema.    No evidence of acute abnormality in the abdomen and pelvis.        Assessment and plan  ---------------------------  49-year-old male with past medical history of diabetes, hypertension, HLD. CVA with right sided hemiplegia, non verbal, dysphagia on PEG tube, recently treated pneumonia with meropenem (8/27-9/2) brought in by EMS from [facility] for difficulty breathing and hypoxia 85% RA, difficulty breathing per EMS and nursing home documentation.  Patient unable to provide additional history.     pt with sig medical and cardiac hx with increasing sob, COVID +, recently treated with pneumoniae  check d dimer if elevated , vq scan to r/o PE  echo  ID eval RDV increase creatinine renal eval  pt with hx of PAF ?AC will discus with Dr Prasad  CAD/ ASHD continue cardiac meds will adjust  increase bp will adjust meds, may increase hydralazine dose  ID noted   CKD continue to observe , on sodium bicarb  renal ultrasound  dvt prophylaxis, check d dimer noted  awaiting echo  chest ct with moderate R  pleural effusion, awaiting echo, may need therapeutic and diagnostic thoracentesis  peg feeding as per medicine  may consider to dc ivf, free water via the peg  bp is better controlled with increase hydralazine dose  may add diuretics will discuss with renal  check lytes  still with increase bp, dc labetalol start on coreg 12.5 mg bid  bp is better controlled with Coreg, echo noted with EF 40%, not a candidate for aggressive measures  pleural effusion ?chf , Renal comments about adding diuretics

## 2024-09-13 NOTE — PROGRESS NOTE ADULT - ASSESSMENT
49-year-old male with past medical history of diabetes, hypertension, HLD. CVA with right sided hemiplegia, non verbal, dysphagia on PEG tube, recently treated pneumonia with meropenem (8/27-9/2) brought in by EMS from [facility] for difficulty breathing and hypoxia 85% RA, difficulty breathing per EMS and nursing home documentation. found to have COVID and QUITA vs advanced CKD. Nephrology consulted for QUITA vs CKD    Suspect QUITA on CKD vs advanced CKD  - Nephrotic syndrome   -QUITA 2/2 prerenal azotemia? vs chronicity   -Hypernatremia     COVID virus  -on RDV    dysphagia  -s/p PEG    CVA  -R sided hemiplegia    RECOMMEND:  -  lasix to po now ;  Add free water to the feeds   - bicarb 650 TID through peg.    - SP Epogen 49660 unit x 1   -Dose Rx for CrCl 20-25mL/min;  Awaiting labs    - GIven overall state I suspect ARB + SGLT2 would be warranted here when the creatinine is stable - This can be started at his rehab as well and the creatinine will need to be trended       Sayed [facility]   [phone]

## 2024-09-13 NOTE — PROGRESS NOTE ADULT - ASSESSMENT
49-year-old male     h/o  HTN/ HLD. DM ,   CVA with right sided hemiplegia, non verbal, dysphagia ,has  PEG tube, recently treated pneumonia with meropenem (8/27-9/2) brought in by EMS from [facility] for difficulty breathing and hypoxia 85%   per   nursing home , pt has   cpvid   QUITA      sob/  hypoxia,  from covid/ pna/  and  chf/ acute  diastolic     prior  h/o  pna's/  suspect  pt has   c/c  aspiration// pna/  cxr . infection  vx  fluid  overload       on   remdidvir/  decadron/   was  on  iv zosyn    per  id  dr raya bedoya,  no  sepsis,  and  no  need  for  ab     gi  eval.    QUITA.  f/p  by enrrique;l  d r ali      CVA,  non verbal, /  r hemiplegia,  bed  bound nal  quadriplegia         follow  fs.  endo  d r monty     quita, resolving     c/c  aspiration. despite  peg      ct ca/p.  mild  goo,  r pl  effusion,  on iv lasix.  fluid  overload. ?  acute   systolic   chf/   seen by  pulm/     echo  ,  ef  40,       meds  adjusted  by card ,  , on coreg. / pt not  an ideal  candidate  for  interention    dvt  ppx /     d/c  plans        pt  is  prior dbr/dni/  epr  n/home paper

## 2024-09-13 NOTE — PROGRESS NOTE ADULT - ASSESSMENT
49-year-old male with past medical history significant for diabetes, hypertension, CVA, hemiplegia with recent hospitalization for pneumonia undergoing course of meropenem who was admitted to the hospital from nursing home due to shortness of breath.    #Acute hypoxic respiratory failure secondary to COVID-19  #QUITA  #Pyuria  RRT on 9/9 AM  Started on piperacillin/tazobactam  CT chest with GGO bilateral, no consolidation, effusions noted as well    Recommendations  Continue dexamethasone  Complete 10-day dexamethasone course  No evidence for bacterial pneumonia on exam/imaging, possible aspiration pneumonitis  Stable off antibiotics  Continue to monitor off  Maintain strict resolution precautions  Wean oxygen as able  Would not treat pyuria noted in UA  Follow fever curve and WBC count    ID to sign off. Please contact as issues arise.    Azael Acosta MD  Division of Infectious Diseases

## 2024-09-13 NOTE — PROGRESS NOTE ADULT - ASSESSMENT
49-year-old male h/o  HTN/ HLD. DM ,   CVA with right sided hemiplegia, non verbal, dysphagia ,has  PEG tube, recently treated pneumonia brought in by EMS from [facility] for difficulty breathing and hypoxia   per   nursing home , pt has   covid   QUITA      Assessment  Hyperglycemia: 49y Male with hyperglycemias, being started on tube feeds via PEG tube, A1c stable, not on DM meds, started on sliding scale now.  Glucose trending up on bolus tube feedings, basal insulin started.   COVID+: on medications, on isolation , monitored.  HTN: on antihypertensive medications, monitored, asymptomatic.    Discussed plan and management wit Dr Mau León MD  Cell: [phone]  Office: [phone]               49-year-old male h/o  HTN/ HLD. DM ,   CVA with right sided hemiplegia, non verbal, dysphagia ,has  PEG tube, recently treated pneumonia brought in by EMS from [facility] for difficulty breathing and hypoxia   per   nursing home , pt has   covid   QUITA        Assessment  Hyperglycemia: 49y Male with hyperglycemias, being started on tube feeds via PEG tube, A1c stable, not on DM meds, started on sliding scale now.  Glucose trending up on bolus tube feedings, basal insulin started.   COVID+: on medications, on isolation , monitored.  HTN: on antihypertensive medications, monitored, asymptomatic.    Discussed plan and management wit Dr Mau León MD  Cell: [phone]  Office: [phone]

## 2024-09-13 NOTE — PROGRESS NOTE ADULT - SUBJECTIVE AND OBJECTIVE BOX
PERRL/EOMI/conjunctiva clear/normal     Chief complaint  Patient is a 49y old  Male who presents with a chief complaint of SOB (13 Sep 2024 17:59)         Labs and Fingersticks  CAPILLARY BLOOD GLUCOSE      POCT Blood Glucose.: 306 mg/dL (13 Sep 2024 17:13)  POCT Blood Glucose.: 290 mg/dL (13 Sep 2024 12:12)  POCT Blood Glucose.: 157 mg/dL (13 Sep 2024 06:24)  POCT Blood Glucose.: 182 mg/dL (13 Sep 2024 00:09)      Anion Gap: 12 (09-13 @ 07:19)      Calcium: 8.4 (09-13 @ 07:19)          09-13    149<H>  |  115<H>  |  56<H>  ----------------------------<  165<H>  4.4   |  22  |  2.38<H>    Ca    8.4      13 Sep 2024 07:19      Medications  MEDICATIONS  (STANDING):  albuterol    90 MICROgram(s) HFA Inhaler 2 Puff(s) Inhalation every 8 hours  albuterol/ipratropium for Nebulization 3 milliLiter(s) Nebulizer every 6 hours  amLODIPine   Tablet 10 milliGRAM(s) Oral daily  aspirin  chewable 81 milliGRAM(s) Oral daily  atorvastatin 40 milliGRAM(s) Oral at bedtime  carvedilol 12.5 milliGRAM(s) Oral every 12 hours  dexAMETHasone     Tablet 6 milliGRAM(s) Oral daily  dextrose 5%. 1000 milliLiter(s) (50 mL/Hr) IV Continuous <Continuous>  dextrose 5%. 1000 milliLiter(s) (100 mL/Hr) IV Continuous <Continuous>  dextrose 50% Injectable 12.5 Gram(s) IV Push once  dextrose 50% Injectable 25 Gram(s) IV Push once  dextrose 50% Injectable 25 Gram(s) IV Push once  furosemide    Tablet 20 milliGRAM(s) Oral daily  glucagon  Injectable 1 milliGRAM(s) IntraMuscular once  heparin   Injectable 5000 Unit(s) SubCutaneous every 12 hours  hydrALAZINE 25 milliGRAM(s) Oral three times a day  insulin glargine Injectable (LANTUS) 8 Unit(s) SubCutaneous at bedtime  insulin lispro (ADMELOG) corrective regimen sliding scale   SubCutaneous every 6 hours  isosorbide   dinitrate Tablet (ISORDIL) 20 milliGRAM(s) Oral three times a day  melatonin 3 milliGRAM(s) Oral at bedtime  pancrelipase (VIOKACE) for Occluded enteral feeding tubes 1 Tablet(s) Enteral Tube once  pantoprazole   Suspension 40 milliGRAM(s) Enteral Tube daily  polyethylene glycol 3350 17 Gram(s) Oral daily  QUEtiapine 25 milliGRAM(s) Oral daily  QUEtiapine 50 milliGRAM(s) Oral at bedtime  sodium bicarbonate 650 milliGRAM(s) Oral three times a day      Physical Exam  General: Patient comfortable in bed   Vital Signs Last 12 Hrs  T(F): 98 (09-13-24 @ 11:53), Max: 98.1 (09-13-24 @ 08:01)  HR: 79 (09-13-24 @ 11:53) (75 - 79)  BP: 124/77 (09-13-24 @ 11:53) (115/76 - 124/77)  BP(mean): --  RR: 18 (09-13-24 @ 11:53) (18 - 18)  SpO2: 94% (09-13-24 @ 11:53) (94% - 94%)    CVS: S1S2   Respiratory: No wheezing, no crepitations  GI: Abdomen soft, bowel sounds positive  Musculoskeletal:  moves all extremities  : Voiding       Chief complaint  Patient is a 49y old  Male who presents with a chief complaint of SOB (13 Sep 2024 17:59)       Labs and Fingersticks  CAPILLARY BLOOD GLUCOSE      POCT Blood Glucose.: 306 mg/dL (13 Sep 2024 17:13)  POCT Blood Glucose.: 290 mg/dL (13 Sep 2024 12:12)  POCT Blood Glucose.: 157 mg/dL (13 Sep 2024 06:24)  POCT Blood Glucose.: 182 mg/dL (13 Sep 2024 00:09)      Anion Gap: 12 (09-13 @ 07:19)      Calcium: 8.4 (09-13 @ 07:19)          09-13    149<H>  |  115<H>  |  56<H>  ----------------------------<  165<H>  4.4   |  22  |  2.38<H>    Ca    8.4      13 Sep 2024 07:19      Medications  MEDICATIONS  (STANDING):  albuterol    90 MICROgram(s) HFA Inhaler 2 Puff(s) Inhalation every 8 hours  albuterol/ipratropium for Nebulization 3 milliLiter(s) Nebulizer every 6 hours  amLODIPine   Tablet 10 milliGRAM(s) Oral daily  aspirin  chewable 81 milliGRAM(s) Oral daily  atorvastatin 40 milliGRAM(s) Oral at bedtime  carvedilol 12.5 milliGRAM(s) Oral every 12 hours  dexAMETHasone     Tablet 6 milliGRAM(s) Oral daily  dextrose 5%. 1000 milliLiter(s) (50 mL/Hr) IV Continuous <Continuous>  dextrose 5%. 1000 milliLiter(s) (100 mL/Hr) IV Continuous <Continuous>  dextrose 50% Injectable 12.5 Gram(s) IV Push once  dextrose 50% Injectable 25 Gram(s) IV Push once  dextrose 50% Injectable 25 Gram(s) IV Push once  furosemide    Tablet 20 milliGRAM(s) Oral daily  glucagon  Injectable 1 milliGRAM(s) IntraMuscular once  heparin   Injectable 5000 Unit(s) SubCutaneous every 12 hours  hydrALAZINE 25 milliGRAM(s) Oral three times a day  insulin glargine Injectable (LANTUS) 8 Unit(s) SubCutaneous at bedtime  insulin lispro (ADMELOG) corrective regimen sliding scale   SubCutaneous every 6 hours  isosorbide   dinitrate Tablet (ISORDIL) 20 milliGRAM(s) Oral three times a day  melatonin 3 milliGRAM(s) Oral at bedtime  pancrelipase (VIOKACE) for Occluded enteral feeding tubes 1 Tablet(s) Enteral Tube once  pantoprazole   Suspension 40 milliGRAM(s) Enteral Tube daily  polyethylene glycol 3350 17 Gram(s) Oral daily  QUEtiapine 25 milliGRAM(s) Oral daily  QUEtiapine 50 milliGRAM(s) Oral at bedtime  sodium bicarbonate 650 milliGRAM(s) Oral three times a day      Physical Exam  General: Patient comfortable in bed   Vital Signs Last 12 Hrs  T(F): 98 (09-13-24 @ 11:53), Max: 98.1 (09-13-24 @ 08:01)  HR: 79 (09-13-24 @ 11:53) (75 - 79)  BP: 124/77 (09-13-24 @ 11:53) (115/76 - 124/77)  BP(mean): --  RR: 18 (09-13-24 @ 11:53) (18 - 18)  SpO2: 94% (09-13-24 @ 11:53) (94% - 94%)    CVS: S1S2   Respiratory: No wheezing, no crepitations  GI: Abdomen soft, bowel sounds positive  Musculoskeletal:  moves all extremities  : Voiding

## 2024-09-13 NOTE — PROGRESS NOTE ADULT - SUBJECTIVE AND OBJECTIVE BOX
NEPHROLOGY-[facility] [phone]        Patient seen and examined in bed.  He was the same   Not on oxygen         MEDICATIONS  (STANDING):  albuterol    90 MICROgram(s) HFA Inhaler 2 Puff(s) Inhalation every 8 hours  albuterol/ipratropium for Nebulization 3 milliLiter(s) Nebulizer every 6 hours  amLODIPine   Tablet 10 milliGRAM(s) Oral daily  aspirin  chewable 81 milliGRAM(s) Oral daily  atorvastatin 40 milliGRAM(s) Oral at bedtime  carvedilol 12.5 milliGRAM(s) Oral every 12 hours  dexAMETHasone     Tablet 6 milliGRAM(s) Oral daily  dextrose 5%. 1000 milliLiter(s) (100 mL/Hr) IV Continuous <Continuous>  dextrose 5%. 1000 milliLiter(s) (50 mL/Hr) IV Continuous <Continuous>  dextrose 50% Injectable 25 Gram(s) IV Push once  dextrose 50% Injectable 25 Gram(s) IV Push once  dextrose 50% Injectable 12.5 Gram(s) IV Push once  furosemide    Tablet 20 milliGRAM(s) Oral daily  glucagon  Injectable 1 milliGRAM(s) IntraMuscular once  heparin   Injectable 5000 Unit(s) SubCutaneous every 12 hours  hydrALAZINE 25 milliGRAM(s) Oral three times a day  insulin lispro (ADMELOG) corrective regimen sliding scale   SubCutaneous every 6 hours  isosorbide   dinitrate Tablet (ISORDIL) 20 milliGRAM(s) Oral three times a day  melatonin 3 milliGRAM(s) Oral at bedtime  pancrelipase (VIOKACE) for Occluded enteral feeding tubes 1 Tablet(s) Enteral Tube once  pantoprazole   Suspension 40 milliGRAM(s) Enteral Tube daily  polyethylene glycol 3350 17 Gram(s) Oral daily  QUEtiapine 50 milliGRAM(s) Oral at bedtime  QUEtiapine 25 milliGRAM(s) Oral daily  sodium bicarbonate 650 milliGRAM(s) Oral three times a day      VITAL:  T(C): , Max: 37.3 (09-12-24 @ 16:23)  T(F): , Max: 99.2 (09-12-24 @ 16:23)  HR: 75 (09-13-24 @ 08:01)  BP: 115/76 (09-13-24 @ 08:01)  BP(mean): --  RR: 18 (09-13-24 @ 08:01)  SpO2: 94% (09-13-24 @ 08:01)  Wt(kg): --    I and O's:    09-12 @ 07:01  -  09-13 @ 07:00  --------------------------------------------------------  IN: 237 mL / OUT: 0 mL / NET: 237 mL          PHYSICAL EXAM:    Constitutional: NAD  Neck:  No JVD  Respiratory: poor effort   Cardiovascular: S1 and S2  Gastrointestinal: BS+, soft, + peg   Extremities: No peripheral edema  Neurological: A/O x 3, right sided weakness   Psychiatric:    : No Naranjo  Skin: No rashes  Access: Not applicable    LABS:    09-13    149<H>  |  115<H>  |  56<H>  ----------------------------<  165<H>  4.4   |  22  |  2.38<H>    Ca    8.4      13 Sep 2024 07:19            Urine Studies:  Urinalysis Basic - ( 13 Sep 2024 07:19 )    Color: x / Appearance: x / SG: x / pH: x  Gluc: 165 mg/dL / Ketone: x  / Bili: x / Urobili: x   Blood: x / Protein: x / Nitrite: x   Leuk Esterase: x / RBC: x / WBC x   Sq Epi: x / Non Sq Epi: x / Bacteria: x            RADIOLOGY & ADDITIONAL STUDIES:

## 2024-09-14 LAB
GLUCOSE BLDC GLUCOMTR-MCNC: 134 MG/DL — HIGH (ref 70–99)
GLUCOSE BLDC GLUCOMTR-MCNC: 141 MG/DL — HIGH (ref 70–99)
GLUCOSE BLDC GLUCOMTR-MCNC: 162 MG/DL — HIGH (ref 70–99)
GLUCOSE BLDC GLUCOMTR-MCNC: 185 MG/DL — HIGH (ref 70–99)
GLUCOSE BLDC GLUCOMTR-MCNC: 188 MG/DL — HIGH (ref 70–99)
GLUCOSE BLDC GLUCOMTR-MCNC: 221 MG/DL — HIGH (ref 70–99)
GLUCOSE BLDC GLUCOMTR-MCNC: 96 MG/DL — SIGNIFICANT CHANGE UP (ref 70–99)

## 2024-09-14 RX ORDER — FUROSEMIDE 40 MG
40 TABLET ORAL DAILY
Refills: 0 | Status: DISCONTINUED | OUTPATIENT
Start: 2024-09-14 | End: 2024-09-18

## 2024-09-14 RX ADMIN — Medication 20 MILLIGRAM(S): at 05:18

## 2024-09-14 RX ADMIN — Medication 6 MILLIGRAM(S): at 05:18

## 2024-09-14 RX ADMIN — CARVEDILOL 12.5 MILLIGRAM(S): 6.25 TABLET ORAL at 05:18

## 2024-09-14 RX ADMIN — Medication 3 MILLIGRAM(S): at 22:08

## 2024-09-14 RX ADMIN — Medication 5000 UNIT(S): at 17:06

## 2024-09-14 RX ADMIN — Medication 2 PUFF(S): at 05:19

## 2024-09-14 RX ADMIN — SODIUM BICARBONATE 650 MILLIGRAM(S): 84 INJECTION, SOLUTION INTRAVENOUS at 05:18

## 2024-09-14 RX ADMIN — Medication 1: at 13:10

## 2024-09-14 RX ADMIN — AMLODIPINE BESYLATE 10 MILLIGRAM(S): 10 TABLET ORAL at 05:18

## 2024-09-14 RX ADMIN — Medication 50 MILLIGRAM(S): at 22:08

## 2024-09-14 RX ADMIN — Medication 5000 UNIT(S): at 05:18

## 2024-09-14 RX ADMIN — IPRATROPIUM BROMIDE AND ALBUTEROL SULFATE 3 MILLILITER(S): .5; 3 SOLUTION RESPIRATORY (INHALATION) at 05:17

## 2024-09-14 RX ADMIN — POLYETHYLENE GLYCOL 3350 17 GRAM(S): 17 POWDER, FOR SOLUTION ORAL at 11:30

## 2024-09-14 RX ADMIN — Medication 2 PUFF(S): at 22:08

## 2024-09-14 RX ADMIN — Medication 40 MILLIGRAM(S): at 22:07

## 2024-09-14 RX ADMIN — Medication 20 MILLIGRAM(S): at 13:10

## 2024-09-14 RX ADMIN — INSULIN GLARGINE 8 UNIT(S): 100 INJECTION, SOLUTION SUBCUTANEOUS at 22:07

## 2024-09-14 RX ADMIN — Medication 25 MILLIGRAM(S): at 05:18

## 2024-09-14 RX ADMIN — IPRATROPIUM BROMIDE AND ALBUTEROL SULFATE 3 MILLILITER(S): .5; 3 SOLUTION RESPIRATORY (INHALATION) at 00:00

## 2024-09-14 RX ADMIN — Medication 81 MILLIGRAM(S): at 11:30

## 2024-09-14 RX ADMIN — Medication 20 MILLIGRAM(S): at 22:08

## 2024-09-14 RX ADMIN — Medication 25 MILLIGRAM(S): at 22:07

## 2024-09-14 RX ADMIN — Medication 2: at 17:06

## 2024-09-14 RX ADMIN — Medication 25 MILLIGRAM(S): at 11:30

## 2024-09-14 RX ADMIN — SODIUM BICARBONATE 650 MILLIGRAM(S): 84 INJECTION, SOLUTION INTRAVENOUS at 22:08

## 2024-09-14 RX ADMIN — IPRATROPIUM BROMIDE AND ALBUTEROL SULFATE 3 MILLILITER(S): .5; 3 SOLUTION RESPIRATORY (INHALATION) at 11:31

## 2024-09-14 RX ADMIN — IPRATROPIUM BROMIDE AND ALBUTEROL SULFATE 3 MILLILITER(S): .5; 3 SOLUTION RESPIRATORY (INHALATION) at 17:06

## 2024-09-14 RX ADMIN — Medication 1: at 00:33

## 2024-09-14 RX ADMIN — SODIUM BICARBONATE 650 MILLIGRAM(S): 84 INJECTION, SOLUTION INTRAVENOUS at 13:10

## 2024-09-14 RX ADMIN — Medication 1: at 06:29

## 2024-09-14 RX ADMIN — Medication 40 MILLIGRAM(S): at 11:30

## 2024-09-14 RX ADMIN — Medication 2 PUFF(S): at 14:04

## 2024-09-14 RX ADMIN — Medication 25 MILLIGRAM(S): at 13:09

## 2024-09-14 RX ADMIN — CARVEDILOL 12.5 MILLIGRAM(S): 6.25 TABLET ORAL at 17:07

## 2024-09-14 NOTE — PROGRESS NOTE ADULT - ASSESSMENT
49-year-old male     h/o  HTN/ HLD. DM ,   CVA with right sided hemiplegia, non verbal, dysphagia ,has  PEG tube, recently treated pneumonia with meropenem (8/27-9/2) brought in by EMS from [facility] for difficulty breathing and hypoxia 85%   per   nursing home , pt has   cpvid   QUITA      sob/  hypoxia,  from covid/ pna/  and  chf/ acute  diastolic     prior  h/o  pna's/  suspect  pt has   c/c  aspiration// pna/  cxr . infection  vx  fluid  overload       on   remdidvir/  decadron/   was  on  iv zosyn    per  id  dr raya bedoya,  no  sepsis,  and  no  need  for  ab     gi  eval.    QUITA.  f/p  by enrrique;l  d r ali      CVA,  non verbal, /  r hemiplegia,  bed  bound nal  quadriplegia         follow  fs.  endo  d r monty     quita, resolving     c/c  aspiration. despite  peg      ct ca/p.  mild  goo,  r pl  effusion,  on iv lasix.  fluid  overload. ?  acute   systolic   chf/   seen by  pulm/     echo  ,  ef  40,       meds  adjusted  by card ,  , on coreg. / pt not  an ideal  candidate  for  intervention    dvt  ppx /     d/c  plans from  yesterday    [facility]  d/c        pt  is  prior dbr/dni/  epr  n/home paper

## 2024-09-14 NOTE — PROGRESS NOTE ADULT - SUBJECTIVE AND OBJECTIVE BOX
Chief complaint  Patient is a 49y old  Male who presents with a chief complaint of SOB (14 Sep 2024 09:50)         Labs and Fingersticks  CAPILLARY BLOOD GLUCOSE      POCT Blood Glucose.: 188 mg/dL (14 Sep 2024 12:38)  POCT Blood Glucose.: 141 mg/dL (14 Sep 2024 08:53)  POCT Blood Glucose.: 162 mg/dL (14 Sep 2024 06:08)  POCT Blood Glucose.: 185 mg/dL (14 Sep 2024 00:23)  POCT Blood Glucose.: 240 mg/dL (13 Sep 2024 21:52)  POCT Blood Glucose.: 306 mg/dL (13 Sep 2024 17:13)      Anion Gap: 12 (09-13 @ 07:19)      Calcium: 8.4 (09-13 @ 07:19)          09-13    149<H>  |  115<H>  |  56<H>  ----------------------------<  165<H>  4.4   |  22  |  2.38<H>    Ca    8.4      13 Sep 2024 07:19      Medications  MEDICATIONS  (STANDING):  albuterol    90 MICROgram(s) HFA Inhaler 2 Puff(s) Inhalation every 8 hours  albuterol/ipratropium for Nebulization 3 milliLiter(s) Nebulizer every 6 hours  amLODIPine   Tablet 10 milliGRAM(s) Oral daily  aspirin  chewable 81 milliGRAM(s) Oral daily  atorvastatin 40 milliGRAM(s) Oral at bedtime  carvedilol 12.5 milliGRAM(s) Oral every 12 hours  dexAMETHasone     Tablet 6 milliGRAM(s) Oral daily  dextrose 5%. 1000 milliLiter(s) (50 mL/Hr) IV Continuous <Continuous>  dextrose 5%. 1000 milliLiter(s) (100 mL/Hr) IV Continuous <Continuous>  dextrose 50% Injectable 25 Gram(s) IV Push once  dextrose 50% Injectable 25 Gram(s) IV Push once  dextrose 50% Injectable 12.5 Gram(s) IV Push once  furosemide    Tablet 40 milliGRAM(s) Oral daily  glucagon  Injectable 1 milliGRAM(s) IntraMuscular once  heparin   Injectable 5000 Unit(s) SubCutaneous every 12 hours  hydrALAZINE 25 milliGRAM(s) Oral three times a day  insulin glargine Injectable (LANTUS) 8 Unit(s) SubCutaneous at bedtime  insulin lispro (ADMELOG) corrective regimen sliding scale   SubCutaneous every 6 hours  isosorbide   dinitrate Tablet (ISORDIL) 20 milliGRAM(s) Oral three times a day  melatonin 3 milliGRAM(s) Oral at bedtime  pancrelipase (VIOKACE) for Occluded enteral feeding tubes 1 Tablet(s) Enteral Tube once  pantoprazole   Suspension 40 milliGRAM(s) Enteral Tube daily  polyethylene glycol 3350 17 Gram(s) Oral daily  QUEtiapine 25 milliGRAM(s) Oral daily  QUEtiapine 50 milliGRAM(s) Oral at bedtime  sodium bicarbonate 650 milliGRAM(s) Oral three times a day      Physical Exam  General: Patient in bed   Vital Signs Last 12 Hrs  T(F): 98.1 (09-14-24 @ 05:14), Max: 98.1 (09-14-24 @ 05:14)  HR: 85 (09-14-24 @ 05:14) (85 - 85)  BP: 160/80 (09-14-24 @ 05:14) (160/80 - 160/80)  BP(mean): --  RR: 18 (09-14-24 @ 05:14) (18 - 18)  SpO2: 100% (09-14-24 @ 05:14) (100% - 100%)    CVS: S1S2   Respiratory: No wheezing, no crepitations  GI: Abdomen soft, bowel sounds positive  Musculoskeletal:  moves all extremities  : Voiding          Chief complaint  Patient is a 49y old  Male who presents with a chief complaint of SOB (14 Sep 2024 09:50)     Labs and Fingersticks  CAPILLARY BLOOD GLUCOSE      POCT Blood Glucose.: 188 mg/dL (14 Sep 2024 12:38)  POCT Blood Glucose.: 141 mg/dL (14 Sep 2024 08:53)  POCT Blood Glucose.: 162 mg/dL (14 Sep 2024 06:08)  POCT Blood Glucose.: 185 mg/dL (14 Sep 2024 00:23)  POCT Blood Glucose.: 240 mg/dL (13 Sep 2024 21:52)  POCT Blood Glucose.: 306 mg/dL (13 Sep 2024 17:13)      Anion Gap: 12 (09-13 @ 07:19)      Calcium: 8.4 (09-13 @ 07:19)          09-13    149<H>  |  115<H>  |  56<H>  ----------------------------<  165<H>  4.4   |  22  |  2.38<H>    Ca    8.4      13 Sep 2024 07:19      Medications  MEDICATIONS  (STANDING):  albuterol    90 MICROgram(s) HFA Inhaler 2 Puff(s) Inhalation every 8 hours  albuterol/ipratropium for Nebulization 3 milliLiter(s) Nebulizer every 6 hours  amLODIPine   Tablet 10 milliGRAM(s) Oral daily  aspirin  chewable 81 milliGRAM(s) Oral daily  atorvastatin 40 milliGRAM(s) Oral at bedtime  carvedilol 12.5 milliGRAM(s) Oral every 12 hours  dexAMETHasone     Tablet 6 milliGRAM(s) Oral daily  dextrose 5%. 1000 milliLiter(s) (50 mL/Hr) IV Continuous <Continuous>  dextrose 5%. 1000 milliLiter(s) (100 mL/Hr) IV Continuous <Continuous>  dextrose 50% Injectable 25 Gram(s) IV Push once  dextrose 50% Injectable 25 Gram(s) IV Push once  dextrose 50% Injectable 12.5 Gram(s) IV Push once  furosemide    Tablet 40 milliGRAM(s) Oral daily  glucagon  Injectable 1 milliGRAM(s) IntraMuscular once  heparin   Injectable 5000 Unit(s) SubCutaneous every 12 hours  hydrALAZINE 25 milliGRAM(s) Oral three times a day  insulin glargine Injectable (LANTUS) 8 Unit(s) SubCutaneous at bedtime  insulin lispro (ADMELOG) corrective regimen sliding scale   SubCutaneous every 6 hours  isosorbide   dinitrate Tablet (ISORDIL) 20 milliGRAM(s) Oral three times a day  melatonin 3 milliGRAM(s) Oral at bedtime  pancrelipase (VIOKACE) for Occluded enteral feeding tubes 1 Tablet(s) Enteral Tube once  pantoprazole   Suspension 40 milliGRAM(s) Enteral Tube daily  polyethylene glycol 3350 17 Gram(s) Oral daily  QUEtiapine 25 milliGRAM(s) Oral daily  QUEtiapine 50 milliGRAM(s) Oral at bedtime  sodium bicarbonate 650 milliGRAM(s) Oral three times a day      Physical Exam  General: Patient in bed   Vital Signs Last 12 Hrs  T(F): 98.1 (09-14-24 @ 05:14), Max: 98.1 (09-14-24 @ 05:14)  HR: 85 (09-14-24 @ 05:14) (85 - 85)  BP: 160/80 (09-14-24 @ 05:14) (160/80 - 160/80)  BP(mean): --  RR: 18 (09-14-24 @ 05:14) (18 - 18)  SpO2: 100% (09-14-24 @ 05:14) (100% - 100%)    CVS: S1S2   Respiratory: No wheezing, no crepitations  GI: Abdomen soft, bowel sounds positive  Musculoskeletal:  moves all extremities  : Voiding

## 2024-09-14 NOTE — PROGRESS NOTE ADULT - ASSESSMENT
49-year-old male h/o  HTN/ HLD. DM ,   CVA with right sided hemiplegia, non verbal, dysphagia ,has  PEG tube, recently treated pneumonia brought in by EMS from [facility] for difficulty breathing and hypoxia   per   nursing home , pt has   covid   QUITA    Assessment  Hyperglycemia: 49y Male with hyperglycemias, being started on tube feeds via PEG tube, A1c stable, not on DM meds, started on sliding scale now.  Glucose trending up on bolus tube feedings, basal insulin started.   COVID+: on medications, on isolation , monitored.  HTN: on antihypertensive medications, monitored, asymptomatic.    Discussed plan and management wit Dr Mau León MD  Cell: [phone]  Office: [phone]               49-year-old male h/o  HTN/ HLD. DM ,   CVA with right sided hemiplegia, non verbal, dysphagia ,has  PEG tube, recently treated pneumonia brought in by EMS from [facility] for difficulty breathing and hypoxia   per   nursing home , pt has   covid   QUITA      Assessment  Hyperglycemia: 49y Male with hyperglycemias, being started on tube feeds via PEG tube, A1c stable, not on DM meds, started on sliding scale now.  Glucose trending up on bolus tube feedings, basal insulin started.   COVID+: on medications, on isolation , monitored.  HTN: on antihypertensive medications, monitored, asymptomatic.    Discussed plan and management wit Dr Mau León MD  Cell: [phone]  Office: [phone]

## 2024-09-14 NOTE — PROGRESS NOTE ADULT - SUBJECTIVE AND OBJECTIVE BOX
Date of Service: 09-14-24 @ 09:51           CARDIOLOGY     PROGRESS  NOTE   ________________________________________________    CHIEF COMPLAINT:Patient is a 49y old  Male who presents with a chief complaint of SOB (14 Sep 2024 08:16)  comfortable  	  REVIEW OF SYSTEMS:  CONSTITUTIONAL: No fever, weight loss, or fatigue  EYES: No eye pain, visual disturbances, or discharge  ENT:  No difficulty hearing, tinnitus, vertigo; No sinus or throat pain  NECK: No pain or stiffness  RESPIRATORY: No cough, wheezing, chills or hemoptysis; No Shortness of Breath  CARDIOVASCULAR: No chest pain, palpitations, passing out, dizziness, or leg swelling  GASTROINTESTINAL: No abdominal or epigastric pain. No nausea, vomiting, or hematemesis; No diarrhea or constipation. No melena or hematochezia.  GENITOURINARY: No dysuria, frequency, hematuria, or incontinence  NEUROLOGICAL: No headaches, memory loss, loss of strength, numbness, or tremors  SKIN: No itching, burning, rashes, or lesions   LYMPH Nodes: No enlarged glands  ENDOCRINE: No heat or cold intolerance; No hair loss  MUSCULOSKELETAL: No joint pain or swelling; No muscle, back, or extremity pain  PSYCHIATRIC: No depression, anxiety, mood swings, or difficulty sleeping  HEME/LYMPH: No easy bruising, or bleeding gums  ALLERGY AND IMMUNOLOGIC: No hives or eczema	    [ ] All others negative	  [x ] Unable to obtain    PHYSICAL EXAM:  T(C): 36.7 (09-14-24 @ 05:14), Max: 37.1 (09-14-24 @ 00:52)  HR: 85 (09-14-24 @ 05:14) (79 - 85)  BP: 160/80 (09-14-24 @ 05:14) (124/77 - 160/88)  RR: 18 (09-14-24 @ 05:14) (18 - 18)  SpO2: 100% (09-14-24 @ 05:14) (94% - 100%)  Wt(kg): --  I&O's Summary    13 Sep 2024 07:01  -  14 Sep 2024 07:00  --------------------------------------------------------  IN: 711 mL / OUT: 0 mL / NET: 711 mL        Appearance: Normal	  HEENT:   Normal oral mucosa, PERRL, EOMI	  Lymphatic: No lymphadenopathy  Cardiovascular: Normal S1 S2, No JVD, + murmurs, No edema  Respiratory: rhonchi  Psychiatry: non verbal  Gastrointestinal:  Soft, Non-tender, + BS	  Skin: No rashes, No ecchymoses, No cyanosis	  Neurologic: can not asses  Extremities: Normal range of motion, No clubbing, cyanosis or edema  Vascular: Peripheral pulses palpable 2+ bilaterally    MEDICATIONS  (STANDING):  albuterol    90 MICROgram(s) HFA Inhaler 2 Puff(s) Inhalation every 8 hours  albuterol/ipratropium for Nebulization 3 milliLiter(s) Nebulizer every 6 hours  amLODIPine   Tablet 10 milliGRAM(s) Oral daily  aspirin  chewable 81 milliGRAM(s) Oral daily  atorvastatin 40 milliGRAM(s) Oral at bedtime  carvedilol 12.5 milliGRAM(s) Oral every 12 hours  dexAMETHasone     Tablet 6 milliGRAM(s) Oral daily  dextrose 5%. 1000 milliLiter(s) (50 mL/Hr) IV Continuous <Continuous>  dextrose 5%. 1000 milliLiter(s) (100 mL/Hr) IV Continuous <Continuous>  dextrose 50% Injectable 12.5 Gram(s) IV Push once  dextrose 50% Injectable 25 Gram(s) IV Push once  dextrose 50% Injectable 25 Gram(s) IV Push once  furosemide    Tablet 20 milliGRAM(s) Oral daily  glucagon  Injectable 1 milliGRAM(s) IntraMuscular once  heparin   Injectable 5000 Unit(s) SubCutaneous every 12 hours  hydrALAZINE 25 milliGRAM(s) Oral three times a day  insulin glargine Injectable (LANTUS) 8 Unit(s) SubCutaneous at bedtime  insulin lispro (ADMELOG) corrective regimen sliding scale   SubCutaneous every 6 hours  isosorbide   dinitrate Tablet (ISORDIL) 20 milliGRAM(s) Oral three times a day  melatonin 3 milliGRAM(s) Oral at bedtime  pancrelipase (VIOKACE) for Occluded enteral feeding tubes 1 Tablet(s) Enteral Tube once  pantoprazole   Suspension 40 milliGRAM(s) Enteral Tube daily  polyethylene glycol 3350 17 Gram(s) Oral daily  QUEtiapine 50 milliGRAM(s) Oral at bedtime  QUEtiapine 25 milliGRAM(s) Oral daily  sodium bicarbonate 650 milliGRAM(s) Oral three times a day      TELEMETRY: 	    ECG:  	  RADIOLOGY:  OTHER: 	  	  LABS:	 	    CARDIAC MARKERS:            09-13    149<H>  |  115<H>  |  56<H>  ----------------------------<  165<H>  4.4   |  22  |  2.38<H>    Ca    8.4      13 Sep 2024 07:19      proBNP:   Lipid Profile:   HgA1c:   TSH: Thyroid Stimulating Hormone, Serum: 2.73 uIU/mL (09-10 @ 07:22)          Assessment and plan  ---------------------------  49-year-old male with past medical history of diabetes, hypertension, HLD. CVA with right sided hemiplegia, non verbal, dysphagia on PEG tube, recently treated pneumonia with meropenem (8/27-9/2) brought in by EMS from [facility] for difficulty breathing and hypoxia 85% RA, difficulty breathing per EMS and nursing home documentation.  Patient unable to provide additional history.     pt with sig medical and cardiac hx with increasing sob, COVID +, recently treated with pneumoniae  check d dimer if elevated , vq scan to r/o PE  echo  ID eval RDV increase creatinine renal eval  pt with hx of PAF ?AC will discus with Dr Prasad  CAD/ ASHD continue cardiac meds will adjust  increase bp will adjust meds, may increase hydralazine dose  ID noted   CKD continue to observe , on sodium bicarb  renal ultrasound  dvt prophylaxis, check d dimer noted  awaiting echo  chest ct with moderate R  pleural effusion, awaiting echo, may need therapeutic and diagnostic thoracentesis  peg feeding as per medicine  may consider to dc ivf, free water via the peg  bp is better controlled with increase hydralazine dose  may add diuretics will discuss with renal  check lytes  still with increase bp, dc labetalol start on coreg 12.5 mg bid  bp is better controlled with Coreg, echo noted with EF 40%, not a candidate for aggressive measures  pleural effusion ?chf , Renal comments about adding diuretics, increase lasix dose  increase hydra or coreg for better bp control

## 2024-09-14 NOTE — PROGRESS NOTE ADULT - SUBJECTIVE AND OBJECTIVE BOX
date of service: 09-14-24 @ 08:17  [facility]  REVIEW OF SYSTEMS:  CONSTITUTIONAL: No fever,  no  weight loss  ENT:  No  tinnitus,   no   vertigo  NECK: No pain or stiffness  RESPIRATORY: No cough, wheezing, chills or hemoptysis;    No Shortness of Breath  CARDIOVASCULAR: No chest pain, palpitations, dizziness  GASTROINTESTINAL: No abdominal or epigastric pain. No nausea, vomiting, or hematemesis; No diarrhea  No melena or hematochezia.  GENITOURINARY: No dysuria, frequency, hematuria, or incontinence  NEUROLOGICAL: No headaches  SKIN: No itching,  no   rash  LYMPH Nodes: No enlarged glands  ENDOCRINE: No heat or cold intolerance  MUSCULOSKELETAL: No joint pain or swelling  PSYCHIATRIC: No depression, anxiety  HEME/LYMPH: No easy bruising, or bleeding gums  ALLERGY AND IMMUNOLOGIC: No hives or eczema	    MEDICATIONS  (STANDING):  albuterol    90 MICROgram(s) HFA Inhaler 2 Puff(s) Inhalation every 8 hours  albuterol/ipratropium for Nebulization 3 milliLiter(s) Nebulizer every 6 hours  amLODIPine   Tablet 10 milliGRAM(s) Oral daily  aspirin  chewable 81 milliGRAM(s) Oral daily  atorvastatin 40 milliGRAM(s) Oral at bedtime  carvedilol 12.5 milliGRAM(s) Oral every 12 hours  dexAMETHasone     Tablet 6 milliGRAM(s) Oral daily  dextrose 5%. 1000 milliLiter(s) (50 mL/Hr) IV Continuous <Continuous>  dextrose 5%. 1000 milliLiter(s) (100 mL/Hr) IV Continuous <Continuous>  dextrose 50% Injectable 12.5 Gram(s) IV Push once  dextrose 50% Injectable 25 Gram(s) IV Push once  dextrose 50% Injectable 25 Gram(s) IV Push once  furosemide    Tablet 20 milliGRAM(s) Oral daily  glucagon  Injectable 1 milliGRAM(s) IntraMuscular once  heparin   Injectable 5000 Unit(s) SubCutaneous every 12 hours  hydrALAZINE 25 milliGRAM(s) Oral three times a day  insulin glargine Injectable (LANTUS) 8 Unit(s) SubCutaneous at bedtime  insulin lispro (ADMELOG) corrective regimen sliding scale   SubCutaneous every 6 hours  isosorbide   dinitrate Tablet (ISORDIL) 20 milliGRAM(s) Oral three times a day  melatonin 3 milliGRAM(s) Oral at bedtime  pancrelipase (VIOKACE) for Occluded enteral feeding tubes 1 Tablet(s) Enteral Tube once  pantoprazole   Suspension 40 milliGRAM(s) Enteral Tube daily  polyethylene glycol 3350 17 Gram(s) Oral daily  QUEtiapine 25 milliGRAM(s) Oral daily  QUEtiapine 50 milliGRAM(s) Oral at bedtime  sodium bicarbonate 650 milliGRAM(s) Oral three times a day    MEDICATIONS  (PRN):  acetaminophen   Oral Liquid .. 650 milliGRAM(s) Oral every 6 hours PRN Temp greater or equal to 38C (100.4F), Moderate Pain (4 - 6)  dextrose Oral Gel 15 Gram(s) Oral once PRN Blood Glucose LESS THAN 70 milliGRAM(s)/deciliter  haloperidol    Injectable 2 milliGRAM(s) IntraMuscular once PRN Agitation      Vital Signs Last 24 Hrs  T(C): 36.7 (14 Sep 2024 05:14), Max: 37.1 (14 Sep 2024 00:52)  T(F): 98.1 (14 Sep 2024 05:14), Max: 98.7 (14 Sep 2024 00:52)  HR: 85 (14 Sep 2024 05:14) (79 - 85)  BP: 160/80 (14 Sep 2024 05:14) (124/77 - 160/88)  BP(mean): --  RR: 18 (14 Sep 2024 05:14) (18 - 18)  SpO2: 100% (14 Sep 2024 05:14) (94% - 100%)    Parameters below as of 14 Sep 2024 05:14  Patient On (Oxygen Delivery Method): room air      CAPILLARY BLOOD GLUCOSE      POCT Blood Glucose.: 162 mg/dL (14 Sep 2024 06:08)  POCT Blood Glucose.: 185 mg/dL (14 Sep 2024 00:23)  POCT Blood Glucose.: 240 mg/dL (13 Sep 2024 21:52)  POCT Blood Glucose.: 306 mg/dL (13 Sep 2024 17:13)  POCT Blood Glucose.: 290 mg/dL (13 Sep 2024 12:12)    I&O's Summary    13 Sep 2024 07:01  -  14 Sep 2024 07:00  --------------------------------------------------------  IN: 711 mL / OUT: 0 mL / NET: 711 mL          Appearance: Normal	  HEENT:   Normal oral mucosa, PERRL, EOMI	  Lymphatic: No lymphadenopathy  Cardiovascular: Normal S1 S2, No JVD  Respiratory: Lungs clear to auscultation	  Gastrointestinal:  Soft, Non-tender, + BS	  Skin: No rash, No ecchymoses	  Extremities:     LABS:    09-13    149<H>  |  115<H>  |  56<H>  ----------------------------<  165<H>  4.4   |  22  |  2.38<H>    Ca    8.4      13 Sep 2024 07:19            Urinalysis Basic - ( 13 Sep 2024 07:19 )    Color: x / Appearance: x / SG: x / pH: x  Gluc: 165 mg/dL / Ketone: x  / Bili: x / Urobili: x   Blood: x / Protein: x / Nitrite: x   Leuk Esterase: x / RBC: x / WBC x   Sq Epi: x / Non Sq Epi: x / Bacteria: x              Thyroid Stimulating Hormone, Serum: 2.73 uIU/mL (09-10 @ 07:22)          Consultant(s) Notes Reviewed:      Care Discussed with Consultants/Other Providers:

## 2024-09-15 LAB
GLUCOSE BLDC GLUCOMTR-MCNC: 118 MG/DL — HIGH (ref 70–99)
GLUCOSE BLDC GLUCOMTR-MCNC: 130 MG/DL — HIGH (ref 70–99)
GLUCOSE BLDC GLUCOMTR-MCNC: 222 MG/DL — HIGH (ref 70–99)
HCT VFR BLD CALC: 30.5 % — LOW (ref 39–50)
HGB BLD-MCNC: 9.8 G/DL — LOW (ref 13–17)
MCHC RBC-ENTMCNC: 27.8 PG — SIGNIFICANT CHANGE UP (ref 27–34)
MCHC RBC-ENTMCNC: 32.1 GM/DL — SIGNIFICANT CHANGE UP (ref 32–36)
MCV RBC AUTO: 86.6 FL — SIGNIFICANT CHANGE UP (ref 80–100)
NRBC # BLD: 0 /100 WBCS — SIGNIFICANT CHANGE UP (ref 0–0)
PLATELET # BLD AUTO: 330 K/UL — SIGNIFICANT CHANGE UP (ref 150–400)
RBC # BLD: 3.52 M/UL — LOW (ref 4.2–5.8)
RBC # FLD: 14.4 % — SIGNIFICANT CHANGE UP (ref 10.3–14.5)
WBC # BLD: 12.58 K/UL — HIGH (ref 3.8–10.5)
WBC # FLD AUTO: 12.58 K/UL — HIGH (ref 3.8–10.5)

## 2024-09-15 RX ORDER — ZINC OXIDE 100 MG/G
1 OINTMENT TOPICAL
Refills: 0 | Status: DISCONTINUED | OUTPATIENT
Start: 2024-09-15 | End: 2024-09-18

## 2024-09-15 RX ADMIN — IPRATROPIUM BROMIDE AND ALBUTEROL SULFATE 3 MILLILITER(S): .5; 3 SOLUTION RESPIRATORY (INHALATION) at 04:40

## 2024-09-15 RX ADMIN — Medication 20 MILLIGRAM(S): at 04:40

## 2024-09-15 RX ADMIN — Medication 20 MILLIGRAM(S): at 22:30

## 2024-09-15 RX ADMIN — IPRATROPIUM BROMIDE AND ALBUTEROL SULFATE 3 MILLILITER(S): .5; 3 SOLUTION RESPIRATORY (INHALATION) at 13:01

## 2024-09-15 RX ADMIN — SODIUM BICARBONATE 650 MILLIGRAM(S): 84 INJECTION, SOLUTION INTRAVENOUS at 04:39

## 2024-09-15 RX ADMIN — Medication 2: at 17:42

## 2024-09-15 RX ADMIN — Medication 40 MILLIGRAM(S): at 13:01

## 2024-09-15 RX ADMIN — Medication 25 MILLIGRAM(S): at 15:59

## 2024-09-15 RX ADMIN — Medication 2 PUFF(S): at 04:41

## 2024-09-15 RX ADMIN — INSULIN GLARGINE 8 UNIT(S): 100 INJECTION, SOLUTION SUBCUTANEOUS at 22:49

## 2024-09-15 RX ADMIN — Medication 50 MILLIGRAM(S): at 22:31

## 2024-09-15 RX ADMIN — Medication 20 MILLIGRAM(S): at 16:00

## 2024-09-15 RX ADMIN — Medication 25 MILLIGRAM(S): at 10:54

## 2024-09-15 RX ADMIN — SODIUM BICARBONATE 650 MILLIGRAM(S): 84 INJECTION, SOLUTION INTRAVENOUS at 16:00

## 2024-09-15 RX ADMIN — CARVEDILOL 12.5 MILLIGRAM(S): 6.25 TABLET ORAL at 17:41

## 2024-09-15 RX ADMIN — SODIUM BICARBONATE 650 MILLIGRAM(S): 84 INJECTION, SOLUTION INTRAVENOUS at 22:31

## 2024-09-15 RX ADMIN — Medication 5000 UNIT(S): at 17:41

## 2024-09-15 RX ADMIN — Medication 2 PUFF(S): at 22:32

## 2024-09-15 RX ADMIN — Medication 25 MILLIGRAM(S): at 22:31

## 2024-09-15 RX ADMIN — IPRATROPIUM BROMIDE AND ALBUTEROL SULFATE 3 MILLILITER(S): .5; 3 SOLUTION RESPIRATORY (INHALATION) at 17:41

## 2024-09-15 RX ADMIN — Medication 40 MILLIGRAM(S): at 22:31

## 2024-09-15 RX ADMIN — Medication 2 PUFF(S): at 13:09

## 2024-09-15 RX ADMIN — Medication 6 MILLIGRAM(S): at 04:40

## 2024-09-15 RX ADMIN — AMLODIPINE BESYLATE 10 MILLIGRAM(S): 10 TABLET ORAL at 04:40

## 2024-09-15 RX ADMIN — IPRATROPIUM BROMIDE AND ALBUTEROL SULFATE 3 MILLILITER(S): .5; 3 SOLUTION RESPIRATORY (INHALATION) at 00:27

## 2024-09-15 RX ADMIN — Medication 40 MILLIGRAM(S): at 04:40

## 2024-09-15 RX ADMIN — ZINC OXIDE 1 APPLICATION(S): 100 OINTMENT TOPICAL at 22:33

## 2024-09-15 RX ADMIN — CARVEDILOL 12.5 MILLIGRAM(S): 6.25 TABLET ORAL at 04:47

## 2024-09-15 RX ADMIN — Medication 5000 UNIT(S): at 04:41

## 2024-09-15 RX ADMIN — Medication 3 MILLIGRAM(S): at 22:31

## 2024-09-15 RX ADMIN — Medication 25 MILLIGRAM(S): at 04:39

## 2024-09-15 NOTE — PROGRESS NOTE ADULT - SUBJECTIVE AND OBJECTIVE BOX
date of service: 09-15-24 @ 08:47  [facility]  REVIEW OF SYSTEMS:  CONSTITUTIONAL: No fever,  no  weight loss  ENT:  No  tinnitus,   no   vertigo  NECK: No pain or stiffness  RESPIRATORY: No cough, wheezing, chills or hemoptysis;    No Shortness of Breath  CARDIOVASCULAR: No chest pain, palpitations, dizziness  GASTROINTESTINAL: No abdominal or epigastric pain. No nausea, vomiting, or hematemesis; No diarrhea  No melena or hematochezia.  GENITOURINARY: No dysuria, frequency, hematuria, or incontinence  NEUROLOGICAL: No headaches  SKIN: No itching,  no   rash  LYMPH Nodes: No enlarged glands  ENDOCRINE: No heat or cold intolerance  MUSCULOSKELETAL: No joint pain or swelling  PSYCHIATRIC: No depression, anxiety  HEME/LYMPH: No easy bruising, or bleeding gums  ALLERGY AND IMMUNOLOGIC: No hives or eczema	    MEDICATIONS  (STANDING):  albuterol    90 MICROgram(s) HFA Inhaler 2 Puff(s) Inhalation every 8 hours  albuterol/ipratropium for Nebulization 3 milliLiter(s) Nebulizer every 6 hours  amLODIPine   Tablet 10 milliGRAM(s) Oral daily  aspirin  chewable 81 milliGRAM(s) Oral daily  atorvastatin 40 milliGRAM(s) Oral at bedtime  carvedilol 12.5 milliGRAM(s) Oral every 12 hours  dexAMETHasone     Tablet 6 milliGRAM(s) Oral daily  dextrose 5%. 1000 milliLiter(s) (50 mL/Hr) IV Continuous <Continuous>  dextrose 5%. 1000 milliLiter(s) (100 mL/Hr) IV Continuous <Continuous>  dextrose 50% Injectable 25 Gram(s) IV Push once  dextrose 50% Injectable 25 Gram(s) IV Push once  dextrose 50% Injectable 12.5 Gram(s) IV Push once  furosemide    Tablet 40 milliGRAM(s) Oral daily  glucagon  Injectable 1 milliGRAM(s) IntraMuscular once  heparin   Injectable 5000 Unit(s) SubCutaneous every 12 hours  hydrALAZINE 25 milliGRAM(s) Oral three times a day  insulin glargine Injectable (LANTUS) 8 Unit(s) SubCutaneous at bedtime  insulin lispro (ADMELOG) corrective regimen sliding scale   SubCutaneous every 6 hours  isosorbide   dinitrate Tablet (ISORDIL) 20 milliGRAM(s) Oral three times a day  melatonin 3 milliGRAM(s) Oral at bedtime  pancrelipase (VIOKACE) for Occluded enteral feeding tubes 1 Tablet(s) Enteral Tube once  pantoprazole   Suspension 40 milliGRAM(s) Enteral Tube daily  polyethylene glycol 3350 17 Gram(s) Oral daily  QUEtiapine 50 milliGRAM(s) Oral at bedtime  QUEtiapine 25 milliGRAM(s) Oral daily  sodium bicarbonate 650 milliGRAM(s) Oral three times a day    MEDICATIONS  (PRN):  acetaminophen   Oral Liquid .. 650 milliGRAM(s) Oral every 6 hours PRN Temp greater or equal to 38C (100.4F), Moderate Pain (4 - 6)  dextrose Oral Gel 15 Gram(s) Oral once PRN Blood Glucose LESS THAN 70 milliGRAM(s)/deciliter  haloperidol    Injectable 2 milliGRAM(s) IntraMuscular once PRN Agitation      Vital Signs Last 24 Hrs  T(C): 36.8 (15 Sep 2024 04:07), Max: 36.9 (15 Sep 2024 00:29)  T(F): 98.2 (15 Sep 2024 04:07), Max: 98.4 (15 Sep 2024 00:29)  HR: 73 (15 Sep 2024 04:07) (73 - 92)  BP: 133/82 (15 Sep 2024 04:07) (133/82 - 160/78)  BP(mean): --  RR: 18 (15 Sep 2024 04:07) (18 - 18)  SpO2: 98% (15 Sep 2024 04:07) (95% - 98%)    Parameters below as of 15 Sep 2024 04:07  Patient On (Oxygen Delivery Method): room air      CAPILLARY BLOOD GLUCOSE      POCT Blood Glucose.: 130 mg/dL (15 Sep 2024 06:12)  POCT Blood Glucose.: 96 mg/dL (14 Sep 2024 23:41)  POCT Blood Glucose.: 134 mg/dL (14 Sep 2024 21:48)  POCT Blood Glucose.: 221 mg/dL (14 Sep 2024 16:10)  POCT Blood Glucose.: 188 mg/dL (14 Sep 2024 12:38)  POCT Blood Glucose.: 141 mg/dL (14 Sep 2024 08:53)    I&O's Summary    14 Sep 2024 07:01  -  15 Sep 2024 07:00  --------------------------------------------------------  IN: 2172 mL / OUT: 11 mL / NET: 2161 mL          Appearance: Normal	  HEENT:   Normal oral mucosa, PERRL, EOMI	  Lymphatic: No lymphadenopathy  Cardiovascular: Normal S1 S2, No JVD  Respiratory: Lungs clear to auscultation	  Gastrointestinal:  Soft, Non-tender, + BS	  Skin: No rash, No ecchymoses	  Extremities:     LABS:                          Thyroid Stimulating Hormone, Serum: 2.73 uIU/mL (09-10 @ 07:22)          Consultant(s) Notes Reviewed:      Care Discussed with Consultants/Other Providers:

## 2024-09-15 NOTE — PROGRESS NOTE ADULT - SUBJECTIVE AND OBJECTIVE BOX
Chief complaint  Patient is a 49y old  Male who presents with a chief complaint of SOB (15 Sep 2024 13:18)         Labs and Fingersticks  CAPILLARY BLOOD GLUCOSE      POCT Blood Glucose.: 130 mg/dL (15 Sep 2024 06:12)  POCT Blood Glucose.: 96 mg/dL (14 Sep 2024 23:41)  POCT Blood Glucose.: 134 mg/dL (14 Sep 2024 21:48)  POCT Blood Glucose.: 221 mg/dL (14 Sep 2024 16:10)                        Medications  MEDICATIONS  (STANDING):  albuterol    90 MICROgram(s) HFA Inhaler 2 Puff(s) Inhalation every 8 hours  albuterol/ipratropium for Nebulization 3 milliLiter(s) Nebulizer every 6 hours  amLODIPine   Tablet 10 milliGRAM(s) Oral daily  atorvastatin 40 milliGRAM(s) Oral at bedtime  carvedilol 12.5 milliGRAM(s) Oral every 12 hours  dextrose 5%. 1000 milliLiter(s) (50 mL/Hr) IV Continuous <Continuous>  dextrose 5%. 1000 milliLiter(s) (100 mL/Hr) IV Continuous <Continuous>  dextrose 50% Injectable 12.5 Gram(s) IV Push once  dextrose 50% Injectable 25 Gram(s) IV Push once  dextrose 50% Injectable 25 Gram(s) IV Push once  furosemide    Tablet 40 milliGRAM(s) Oral daily  glucagon  Injectable 1 milliGRAM(s) IntraMuscular once  heparin   Injectable 5000 Unit(s) SubCutaneous every 12 hours  hydrALAZINE 25 milliGRAM(s) Oral three times a day  insulin glargine Injectable (LANTUS) 8 Unit(s) SubCutaneous at bedtime  insulin lispro (ADMELOG) corrective regimen sliding scale   SubCutaneous every 6 hours  isosorbide   dinitrate Tablet (ISORDIL) 20 milliGRAM(s) Oral three times a day  melatonin 3 milliGRAM(s) Oral at bedtime  pancrelipase (VIOKACE) for Occluded enteral feeding tubes 1 Tablet(s) Enteral Tube once  pantoprazole   Suspension 40 milliGRAM(s) Enteral Tube daily  polyethylene glycol 3350 17 Gram(s) Oral daily  QUEtiapine 50 milliGRAM(s) Oral at bedtime  QUEtiapine 25 milliGRAM(s) Oral daily  sodium bicarbonate 650 milliGRAM(s) Oral three times a day  zinc oxide 20% Ointment 1 Application(s) Topical two times a day      Physical Exam  General: Patient comfortable in bed   Vital Signs Last 12 Hrs  T(F): 97.9 (09-15-24 @ 10:19), Max: 98.2 (09-15-24 @ 04:07)  HR: 87 (09-15-24 @ 10:19) (73 - 87)  BP: 143/81 (09-15-24 @ 10:19) (133/82 - 143/81)  BP(mean): --  RR: 18 (09-15-24 @ 10:19) (18 - 18)  SpO2: 100% (09-15-24 @ 10:19) (98% - 100%)    CVS: S1S2   Respiratory: No wheezing, no crepitations  GI: Abdomen soft, bowel sounds positive  Musculoskeletal:  moves all extremities  : Voiding       Chief complaint  Patient is a 49y old  Male who presents with a chief complaint of SOB (15 Sep 2024 13:18)     Labs and Fingersticks  CAPILLARY BLOOD GLUCOSE      POCT Blood Glucose.: 130 mg/dL (15 Sep 2024 06:12)  POCT Blood Glucose.: 96 mg/dL (14 Sep 2024 23:41)  POCT Blood Glucose.: 134 mg/dL (14 Sep 2024 21:48)  POCT Blood Glucose.: 221 mg/dL (14 Sep 2024 16:10)    Medications  MEDICATIONS  (STANDING):  albuterol    90 MICROgram(s) HFA Inhaler 2 Puff(s) Inhalation every 8 hours  albuterol/ipratropium for Nebulization 3 milliLiter(s) Nebulizer every 6 hours  amLODIPine   Tablet 10 milliGRAM(s) Oral daily  atorvastatin 40 milliGRAM(s) Oral at bedtime  carvedilol 12.5 milliGRAM(s) Oral every 12 hours  dextrose 5%. 1000 milliLiter(s) (50 mL/Hr) IV Continuous <Continuous>  dextrose 5%. 1000 milliLiter(s) (100 mL/Hr) IV Continuous <Continuous>  dextrose 50% Injectable 12.5 Gram(s) IV Push once  dextrose 50% Injectable 25 Gram(s) IV Push once  dextrose 50% Injectable 25 Gram(s) IV Push once  furosemide    Tablet 40 milliGRAM(s) Oral daily  glucagon  Injectable 1 milliGRAM(s) IntraMuscular once  heparin   Injectable 5000 Unit(s) SubCutaneous every 12 hours  hydrALAZINE 25 milliGRAM(s) Oral three times a day  insulin glargine Injectable (LANTUS) 8 Unit(s) SubCutaneous at bedtime  insulin lispro (ADMELOG) corrective regimen sliding scale   SubCutaneous every 6 hours  isosorbide   dinitrate Tablet (ISORDIL) 20 milliGRAM(s) Oral three times a day  melatonin 3 milliGRAM(s) Oral at bedtime  pancrelipase (VIOKACE) for Occluded enteral feeding tubes 1 Tablet(s) Enteral Tube once  pantoprazole   Suspension 40 milliGRAM(s) Enteral Tube daily  polyethylene glycol 3350 17 Gram(s) Oral daily  QUEtiapine 50 milliGRAM(s) Oral at bedtime  QUEtiapine 25 milliGRAM(s) Oral daily  sodium bicarbonate 650 milliGRAM(s) Oral three times a day  zinc oxide 20% Ointment 1 Application(s) Topical two times a day      Physical Exam  General: Patient comfortable in bed   Vital Signs Last 12 Hrs  T(F): 97.9 (09-15-24 @ 10:19), Max: 98.2 (09-15-24 @ 04:07)  HR: 87 (09-15-24 @ 10:19) (73 - 87)  BP: 143/81 (09-15-24 @ 10:19) (133/82 - 143/81)  BP(mean): --  RR: 18 (09-15-24 @ 10:19) (18 - 18)  SpO2: 100% (09-15-24 @ 10:19) (98% - 100%)    CVS: S1S2   Respiratory: No wheezing, no crepitations  GI: Abdomen soft, bowel sounds positive  Musculoskeletal:  moves all extremities  : Voiding

## 2024-09-15 NOTE — PROGRESS NOTE ADULT - SUBJECTIVE AND OBJECTIVE BOX
NEPHROLOGY-[facility] [phone]        Patient seen and examined. not on oxygen. one loose bm this morning         MEDICATIONS  (STANDING):  albuterol    90 MICROgram(s) HFA Inhaler 2 Puff(s) Inhalation every 8 hours  albuterol/ipratropium for Nebulization 3 milliLiter(s) Nebulizer every 6 hours  amLODIPine   Tablet 10 milliGRAM(s) Oral daily  atorvastatin 40 milliGRAM(s) Oral at bedtime  carvedilol 12.5 milliGRAM(s) Oral every 12 hours  dextrose 5%. 1000 milliLiter(s) (50 mL/Hr) IV Continuous <Continuous>  dextrose 5%. 1000 milliLiter(s) (100 mL/Hr) IV Continuous <Continuous>  dextrose 50% Injectable 12.5 Gram(s) IV Push once  dextrose 50% Injectable 25 Gram(s) IV Push once  dextrose 50% Injectable 25 Gram(s) IV Push once  furosemide    Tablet 40 milliGRAM(s) Oral daily  glucagon  Injectable 1 milliGRAM(s) IntraMuscular once  heparin   Injectable 5000 Unit(s) SubCutaneous every 12 hours  hydrALAZINE 25 milliGRAM(s) Oral three times a day  insulin glargine Injectable (LANTUS) 8 Unit(s) SubCutaneous at bedtime  insulin lispro (ADMELOG) corrective regimen sliding scale   SubCutaneous every 6 hours  isosorbide   dinitrate Tablet (ISORDIL) 20 milliGRAM(s) Oral three times a day  melatonin 3 milliGRAM(s) Oral at bedtime  pancrelipase (VIOKACE) for Occluded enteral feeding tubes 1 Tablet(s) Enteral Tube once  pantoprazole   Suspension 40 milliGRAM(s) Enteral Tube daily  polyethylene glycol 3350 17 Gram(s) Oral daily  QUEtiapine 25 milliGRAM(s) Oral daily  QUEtiapine 50 milliGRAM(s) Oral at bedtime  sodium bicarbonate 650 milliGRAM(s) Oral three times a day  zinc oxide 20% Ointment 1 Application(s) Topical two times a day      VITAL:  T(C): , Max: 36.9 (09-15-24 @ 00:29)  T(F): , Max: 98.4 (09-15-24 @ 00:29)  HR: 87 (09-15-24 @ 10:19)  BP: 143/81 (09-15-24 @ 10:19)  BP(mean): --  RR: 18 (09-15-24 @ 10:19)  SpO2: 100% (09-15-24 @ 10:19)  Wt(kg): --    I and O's:    09-14 @ 07:01  -  09-15 @ 07:00  --------------------------------------------------------  IN: 2172 mL / OUT: 11 mL / NET: 2161 mL    09-15 @ 07:01  -  09-15 @ 13:19  --------------------------------------------------------  IN: 362 mL / OUT: 0 mL / NET: 362 mL          PHYSICAL EXAM:    Constitutional: NAD  Neck:  No JVD  Respiratory: CTAB/L  Cardiovascular: S1 and S2  Gastrointestinal: BS+, soft, NT/ND, +peg  Extremities: No peripheral edema  Neurological: Alert, right sided weakness  : No Naranjo  Skin: No rashes  Access: Not applicable    LABS:                Urine Studies:          RADIOLOGY & ADDITIONAL STUDIES:

## 2024-09-15 NOTE — PROGRESS NOTE ADULT - ASSESSMENT
49-year-old male     h/o  HTN/ HLD. DM ,   CVA with right sided hemiplegia, non verbal, dysphagia ,has  PEG tube, recently treated pneumonia with meropenem (8/27-9/2) brought in by EMS from [facility] for difficulty breathing and hypoxia 85%   per   nursing home , pt has   cpvid   QUITA      sob/  hypoxia,  from covid/ pna/  and  chf/ acute  diastolic     prior  h/o  pna's/  suspect  pt has   c/c  aspiration// pna/  cxr . infection  vx  fluid  overload       on   remdidvir/  decadron/   was  on  iv zosyn    per  id  dr raya bedoya,  no  sepsis,  and  no  need  for  ab     gi  eval.    QUITA.  f/p  by enrrique;l  d r ali      CVA,  non verbal, /  r hemiplegia,  bed  bound nal  quadriplegia         follow  fs.  endo  d r monty     quita, resolving     c/c  aspiration. despite  peg      ct ca/p.  mild  goo,  r pl  effusion,  on iv lasix.  fluid  overload. ?  acute   systolic   chf/   seen by  pulm/     echo  ,  ef  40,       meds  adjusted  by card ,  , on coreg. / pt not  an ideal  candidate  for  intervention    dvt  ppx /     d/c  order  written  '  pe r floor,  n/ home  will  only  accept  at a  certian  date,  due  to covid/  ha s been  cleraed     sameera  d/c        pt  is  prior dbr/dni/  epr  n/home paper            49-year-old male     h/o  HTN/ HLD. DM ,   CVA with right sided hemiplegia, non verbal, dysphagia ,has  PEG tube, recently treated pneumonia with meropenem (8/27-9/2) brought in by EMS from [facility] for difficulty breathing and hypoxia 85%   per   nursing home , pt has   cpvid   QUITA      sob/  hypoxia,  from covid/ pna/  and  chf/ acute  diastolic     prior  h/o  pna's/  suspect  pt has   c/c  aspiration// pna/  cxr . infection  vx  fluid  overload       on   remdidvir/  decadron/   was  on  iv zosyn    per  id  dr raya bedoya,  no  sepsis,  and  no  need  for  ab     gi  eval.    QUITA.  f/p  by enrrique;l  d r ali      CVA,  non verbal, /  r hemiplegia,  bed  bound nal  quadriplegia         follow  fs.  endo  d r millan     quita, resolving     c/c  aspiration. despite  peg      ct ca/p.  mild  goo,  r pl  effusion,  on iv lasix.  fluid  overload. ?  acute   systolic   chf/   seen by  pulm/     echo  ,  ef  40,      dermatitis/  moisture, on back. on zinx c oxide/  fungal  discolore d toes,  no rx     meds  adjusted  by card ,  , on coreg. / pt not  an ideal  candidate  for  intervention    dvt  ppx /     d/c  order  written  '  pe r floor,  n/ home  will  only  accept  at a  certian  date,  due  to covid/  ha s been  cleraed     awiat  d/c        pt  is  prior dbr/dni/  epr  n/home paper

## 2024-09-15 NOTE — PROGRESS NOTE ADULT - SUBJECTIVE AND OBJECTIVE BOX
Date of Service: 09-15-24 @ 08:13           CARDIOLOGY     PROGRESS  NOTE   ________________________________________________    CHIEF COMPLAINT:Patient is a 49y old  Male who presents with a chief complaint of SOB (14 Sep 2024 13:44)  no complain  	  REVIEW OF SYSTEMS:  CONSTITUTIONAL: No fever, weight loss, or fatigue  EYES: No eye pain, visual disturbances, or discharge  ENT:  No difficulty hearing, tinnitus, vertigo; No sinus or throat pain  NECK: No pain or stiffness  RESPIRATORY: No cough, wheezing, chills or hemoptysis; No Shortness of Breath  CARDIOVASCULAR: No chest pain, palpitations, passing out, dizziness, or leg swelling  GASTROINTESTINAL: No abdominal or epigastric pain. No nausea, vomiting, or hematemesis; No diarrhea or constipation. No melena or hematochezia.  GENITOURINARY: No dysuria, frequency, hematuria, or incontinence  NEUROLOGICAL: No headaches, memory loss, loss of strength, numbness, or tremors  SKIN: No itching, burning, rashes, or lesions   LYMPH Nodes: No enlarged glands  ENDOCRINE: No heat or cold intolerance; No hair loss  MUSCULOSKELETAL: No joint pain or swelling; No muscle, back, or extremity pain  PSYCHIATRIC: No depression, anxiety, mood swings, or difficulty sleeping  HEME/LYMPH: No easy bruising, or bleeding gums  ALLERGY AND IMMUNOLOGIC: No hives or eczema	    [ ] All others negative	  [ x] Unable to obtain    PHYSICAL EXAM:  T(C): 36.8 (09-15-24 @ 04:07), Max: 36.9 (09-15-24 @ 00:29)  HR: 73 (09-15-24 @ 04:07) (73 - 92)  BP: 133/82 (09-15-24 @ 04:07) (133/82 - 160/78)  RR: 18 (09-15-24 @ 04:07) (18 - 18)  SpO2: 98% (09-15-24 @ 04:07) (95% - 98%)  Wt(kg): --  I&O's Summary    14 Sep 2024 07:01  -  15 Sep 2024 07:00  --------------------------------------------------------  IN: 2172 mL / OUT: 11 mL / NET: 2161 mL        Appearance: Normal	  HEENT:   Normal oral mucosa, PERRL, EOMI	  Lymphatic: No lymphadenopathy  Cardiovascular: Normal S1 S2, No JVD, + murmurs, No edema  Respiratory: rhonchi  Psychiatry :non verbal   Gastrointestinal:  Soft, Non-tender, + BS, peg	  Skin: No rashes, No ecchymoses, No cyanosis	  Neurologic can not asses  Extremities: No clubbing, cyanosis or edema  Vascular: Peripheral pulses palpable 2+ bilaterally    MEDICATIONS  (STANDING):  albuterol    90 MICROgram(s) HFA Inhaler 2 Puff(s) Inhalation every 8 hours  albuterol/ipratropium for Nebulization 3 milliLiter(s) Nebulizer every 6 hours  amLODIPine   Tablet 10 milliGRAM(s) Oral daily  aspirin  chewable 81 milliGRAM(s) Oral daily  atorvastatin 40 milliGRAM(s) Oral at bedtime  carvedilol 12.5 milliGRAM(s) Oral every 12 hours  dexAMETHasone     Tablet 6 milliGRAM(s) Oral daily  dextrose 5%. 1000 milliLiter(s) (50 mL/Hr) IV Continuous <Continuous>  dextrose 5%. 1000 milliLiter(s) (100 mL/Hr) IV Continuous <Continuous>  dextrose 50% Injectable 12.5 Gram(s) IV Push once  dextrose 50% Injectable 25 Gram(s) IV Push once  dextrose 50% Injectable 25 Gram(s) IV Push once  furosemide    Tablet 40 milliGRAM(s) Oral daily  glucagon  Injectable 1 milliGRAM(s) IntraMuscular once  heparin   Injectable 5000 Unit(s) SubCutaneous every 12 hours  hydrALAZINE 25 milliGRAM(s) Oral three times a day  insulin glargine Injectable (LANTUS) 8 Unit(s) SubCutaneous at bedtime  insulin lispro (ADMELOG) corrective regimen sliding scale   SubCutaneous every 6 hours  isosorbide   dinitrate Tablet (ISORDIL) 20 milliGRAM(s) Oral three times a day  melatonin 3 milliGRAM(s) Oral at bedtime  pancrelipase (VIOKACE) for Occluded enteral feeding tubes 1 Tablet(s) Enteral Tube once  pantoprazole   Suspension 40 milliGRAM(s) Enteral Tube daily  polyethylene glycol 3350 17 Gram(s) Oral daily  QUEtiapine 50 milliGRAM(s) Oral at bedtime  QUEtiapine 25 milliGRAM(s) Oral daily  sodium bicarbonate 650 milliGRAM(s) Oral three times a day      TELEMETRY: 	    ECG:  	  RADIOLOGY:  OTHER: 	  	  LABS:	 	    CARDIAC MARKERS:      proBNP:   Lipid Profile:   HgA1c:   TSH: Thyroid Stimulating Hormone, Serum: 2.73 uIU/mL (09-10 @ 07:22)      Assessment and plan  ---------------------------  49-year-old male with past medical history of diabetes, hypertension, HLD. CVA with right sided hemiplegia, non verbal, dysphagia on PEG tube, recently treated pneumonia with meropenem (8/27-9/2) brought in by EMS from [facility] for difficulty breathing and hypoxia 85% RA, difficulty breathing per EMS and nursing home documentation.  Patient unable to provide additional history.     pt with sig medical and cardiac hx with increasing sob, COVID +, recently treated with pneumoniae  check d dimer if elevated , vq scan to r/o PE  echo  ID eval RDV increase creatinine renal eval  pt with hx of PAF ?AC will discus with Dr Prasad  CAD/ ASHD continue cardiac meds will adjust  increase bp will adjust meds, may increase hydralazine dose  ID noted   CKD continue to observe , on sodium bicarb  renal ultrasound  dvt prophylaxis, check d dimer noted  awaiting echo  chest ct with moderate R  pleural effusion, awaiting echo, may need therapeutic and diagnostic thoracentesis  peg feeding as per medicine  may consider to dc ivf, free water via the peg  bp is better controlled with increase hydralazine dose  may add diuretics will discuss with renal  check lytes  still with increase bp, dc labetalol start on coreg 12.5 mg bid  bp is better controlled with Coreg, echo noted with EF 40%, not a candidate for aggressive measures  pleural effusion ?chf , Renal comments about adding diuretics, increase lasix dose  increase hydra or coreg for better bp control, bp is well controlled today  renal function needs to be followed closely

## 2024-09-15 NOTE — PROGRESS NOTE ADULT - ASSESSMENT
49-year-old male h/o  HTN/ HLD. DM ,   CVA with right sided hemiplegia, non verbal, dysphagia ,has  PEG tube, recently treated pneumonia brought in by EMS from [facility] for difficulty breathing and hypoxia   per   nursing home , pt has   covid   QUITA      Assessment  Hyperglycemia: 49y Male with hyperglycemias, being started on tube feeds via PEG tube, A1c stable, not on DM meds, started on sliding scale now.  Glucose trending up on bolus tube feedings, basal insulin started.   COVID+: on medications, on isolation , monitored.  HTN: on antihypertensive medications, monitored, asymptomatic.    Discussed plan and management wit Dr Mau León MD  Cell: [phone]  Office: [phone]               49-year-old male h/o  HTN/ HLD. DM ,   CVA with right sided hemiplegia, non verbal, dysphagia ,has  PEG tube, recently treated pneumonia brought in by EMS from [facility] for difficulty breathing and hypoxia   per   nursing home , pt has   covid   QUITA        Assessment  Hyperglycemia: 49y Male with hyperglycemias, being started on tube feeds via PEG tube, A1c stable, not on DM meds, started on sliding scale now.  Glucose trending up on bolus tube feedings, basal insulin started.   COVID+: on medications, on isolation , monitored.  HTN: on antihypertensive medications, monitored, asymptomatic.    Discussed plan and management wit Dr Mau León MD  Cell: [phone]  Office: [phone]

## 2024-09-15 NOTE — PROGRESS NOTE ADULT - ASSESSMENT
49-year-old male with past medical history of diabetes, hypertension, HLD. CVA with right sided hemiplegia, non verbal, dysphagia on PEG tube, recently treated pneumonia with meropenem (8/27-9/2) brought in by EMS from [facility] for difficulty breathing and hypoxia 85% RA, difficulty breathing per EMS and nursing home documentation. found to have COVID and QUITA vs advanced CKD. Nephrology consulted for QUITA vs CKD    Suspect QUITA on CKD vs advanced CKD  - Nephrotic syndrome   -QUITA 2/2 prerenal azotemia? vs chronicity   -Hypernatremia     COVID virus  -on RDV    dysphagia  -s/p PEG    CVA  -R sided hemiplegia    RECOMMEND:  - continue Lasix 40 po daily now ;  Add free water to the feeds   - bicarb 650 TID through peg.    - SP Epogen 15555 unit x 1   -Dose Rx for CrCl 20-25mL/min;  Awaiting labs    - GIven overall state I suspect ARB + SGLT2 would be warranted here when the creatinine is stable - This can be started at his rehab as well and the creatinine will need to be trended   - BMP daily

## 2024-09-16 LAB
ADD ON TEST-SPECIMEN IN LAB: SIGNIFICANT CHANGE UP
ANION GAP SERPL CALC-SCNC: 10 MMOL/L — SIGNIFICANT CHANGE UP (ref 5–17)
BASOPHILS # BLD AUTO: 0.03 K/UL — SIGNIFICANT CHANGE UP (ref 0–0.2)
BASOPHILS NFR BLD AUTO: 0.2 % — SIGNIFICANT CHANGE UP (ref 0–2)
BUN SERPL-MCNC: 58 MG/DL — HIGH (ref 7–23)
CALCIUM SERPL-MCNC: 8.1 MG/DL — LOW (ref 8.4–10.5)
CHLORIDE SERPL-SCNC: 106 MMOL/L — SIGNIFICANT CHANGE UP (ref 96–108)
CO2 SERPL-SCNC: 22 MMOL/L — SIGNIFICANT CHANGE UP (ref 22–31)
CREAT SERPL-MCNC: 2.14 MG/DL — HIGH (ref 0.5–1.3)
EGFR: 37 ML/MIN/1.73M2 — LOW
EOSINOPHIL # BLD AUTO: 0.98 K/UL — HIGH (ref 0–0.5)
EOSINOPHIL NFR BLD AUTO: 7 % — HIGH (ref 0–6)
GLUCOSE BLDC GLUCOMTR-MCNC: 106 MG/DL — HIGH (ref 70–99)
GLUCOSE BLDC GLUCOMTR-MCNC: 109 MG/DL — HIGH (ref 70–99)
GLUCOSE BLDC GLUCOMTR-MCNC: 109 MG/DL — HIGH (ref 70–99)
GLUCOSE BLDC GLUCOMTR-MCNC: 77 MG/DL — SIGNIFICANT CHANGE UP (ref 70–99)
GLUCOSE BLDC GLUCOMTR-MCNC: 77 MG/DL — SIGNIFICANT CHANGE UP (ref 70–99)
GLUCOSE BLDC GLUCOMTR-MCNC: 94 MG/DL — SIGNIFICANT CHANGE UP (ref 70–99)
GLUCOSE SERPL-MCNC: 102 MG/DL — HIGH (ref 70–99)
HCT VFR BLD CALC: 29.2 % — LOW (ref 39–50)
HGB BLD-MCNC: 9.4 G/DL — LOW (ref 13–17)
IMM GRANULOCYTES NFR BLD AUTO: 1.3 % — HIGH (ref 0–0.9)
LYMPHOCYTES # BLD AUTO: 23.4 % — SIGNIFICANT CHANGE UP (ref 13–44)
LYMPHOCYTES # BLD AUTO: 3.28 K/UL — SIGNIFICANT CHANGE UP (ref 1–3.3)
MCHC RBC-ENTMCNC: 28.2 PG — SIGNIFICANT CHANGE UP (ref 27–34)
MCHC RBC-ENTMCNC: 32.2 GM/DL — SIGNIFICANT CHANGE UP (ref 32–36)
MCV RBC AUTO: 87.7 FL — SIGNIFICANT CHANGE UP (ref 80–100)
MONOCYTES # BLD AUTO: 1.12 K/UL — HIGH (ref 0–0.9)
MONOCYTES NFR BLD AUTO: 8 % — SIGNIFICANT CHANGE UP (ref 2–14)
NEUTROPHILS # BLD AUTO: 8.4 K/UL — HIGH (ref 1.8–7.4)
NEUTROPHILS NFR BLD AUTO: 60.1 % — SIGNIFICANT CHANGE UP (ref 43–77)
NRBC # BLD: 0 /100 WBCS — SIGNIFICANT CHANGE UP (ref 0–0)
PLATELET # BLD AUTO: 341 K/UL — SIGNIFICANT CHANGE UP (ref 150–400)
POTASSIUM SERPL-MCNC: 5 MMOL/L — SIGNIFICANT CHANGE UP (ref 3.5–5.3)
POTASSIUM SERPL-SCNC: 5 MMOL/L — SIGNIFICANT CHANGE UP (ref 3.5–5.3)
RBC # BLD: 3.33 M/UL — LOW (ref 4.2–5.8)
RBC # FLD: 14.5 % — SIGNIFICANT CHANGE UP (ref 10.3–14.5)
SODIUM SERPL-SCNC: 138 MMOL/L — SIGNIFICANT CHANGE UP (ref 135–145)
WBC # BLD: 13.77 K/UL — HIGH (ref 3.8–10.5)
WBC # FLD AUTO: 13.77 K/UL — HIGH (ref 3.8–10.5)

## 2024-09-16 PROCEDURE — 99232 SBSQ HOSP IP/OBS MODERATE 35: CPT

## 2024-09-16 PROCEDURE — 99221 1ST HOSP IP/OBS SF/LOW 40: CPT

## 2024-09-16 RX ORDER — POVIDONE-IODINE 10 %
1 SOLUTION, NON-ORAL TOPICAL DAILY
Refills: 0 | Status: DISCONTINUED | OUTPATIENT
Start: 2024-09-16 | End: 2024-09-18

## 2024-09-16 RX ADMIN — Medication 20 MILLIGRAM(S): at 21:59

## 2024-09-16 RX ADMIN — Medication 5000 UNIT(S): at 05:33

## 2024-09-16 RX ADMIN — ZINC OXIDE 1 APPLICATION(S): 100 OINTMENT TOPICAL at 22:00

## 2024-09-16 RX ADMIN — IPRATROPIUM BROMIDE AND ALBUTEROL SULFATE 3 MILLILITER(S): .5; 3 SOLUTION RESPIRATORY (INHALATION) at 00:48

## 2024-09-16 RX ADMIN — AMLODIPINE BESYLATE 10 MILLIGRAM(S): 10 TABLET ORAL at 05:34

## 2024-09-16 RX ADMIN — Medication 40 MILLIGRAM(S): at 05:35

## 2024-09-16 RX ADMIN — Medication 25 MILLIGRAM(S): at 10:40

## 2024-09-16 RX ADMIN — Medication 3 MILLIGRAM(S): at 21:58

## 2024-09-16 RX ADMIN — Medication 2 PUFF(S): at 05:36

## 2024-09-16 RX ADMIN — Medication 50 MILLIGRAM(S): at 21:59

## 2024-09-16 RX ADMIN — Medication 5000 UNIT(S): at 17:03

## 2024-09-16 RX ADMIN — IPRATROPIUM BROMIDE AND ALBUTEROL SULFATE 3 MILLILITER(S): .5; 3 SOLUTION RESPIRATORY (INHALATION) at 05:34

## 2024-09-16 RX ADMIN — Medication 20 MILLIGRAM(S): at 13:14

## 2024-09-16 RX ADMIN — Medication 25 MILLIGRAM(S): at 22:00

## 2024-09-16 RX ADMIN — POLYETHYLENE GLYCOL 3350 17 GRAM(S): 17 POWDER, FOR SOLUTION ORAL at 13:15

## 2024-09-16 RX ADMIN — Medication 25 MILLIGRAM(S): at 13:15

## 2024-09-16 RX ADMIN — IPRATROPIUM BROMIDE AND ALBUTEROL SULFATE 3 MILLILITER(S): .5; 3 SOLUTION RESPIRATORY (INHALATION) at 23:09

## 2024-09-16 RX ADMIN — CARVEDILOL 12.5 MILLIGRAM(S): 6.25 TABLET ORAL at 17:04

## 2024-09-16 RX ADMIN — Medication 20 MILLIGRAM(S): at 05:34

## 2024-09-16 RX ADMIN — IPRATROPIUM BROMIDE AND ALBUTEROL SULFATE 3 MILLILITER(S): .5; 3 SOLUTION RESPIRATORY (INHALATION) at 13:15

## 2024-09-16 RX ADMIN — SODIUM BICARBONATE 650 MILLIGRAM(S): 84 INJECTION, SOLUTION INTRAVENOUS at 05:35

## 2024-09-16 RX ADMIN — CARVEDILOL 12.5 MILLIGRAM(S): 6.25 TABLET ORAL at 05:35

## 2024-09-16 RX ADMIN — Medication 25 MILLIGRAM(S): at 05:35

## 2024-09-16 RX ADMIN — Medication 40 MILLIGRAM(S): at 21:58

## 2024-09-16 RX ADMIN — Medication 40 MILLIGRAM(S): at 13:14

## 2024-09-16 RX ADMIN — IPRATROPIUM BROMIDE AND ALBUTEROL SULFATE 3 MILLILITER(S): .5; 3 SOLUTION RESPIRATORY (INHALATION) at 17:03

## 2024-09-16 RX ADMIN — SODIUM BICARBONATE 650 MILLIGRAM(S): 84 INJECTION, SOLUTION INTRAVENOUS at 21:59

## 2024-09-16 RX ADMIN — SODIUM BICARBONATE 650 MILLIGRAM(S): 84 INJECTION, SOLUTION INTRAVENOUS at 13:15

## 2024-09-16 RX ADMIN — ZINC OXIDE 1 APPLICATION(S): 100 OINTMENT TOPICAL at 10:42

## 2024-09-16 RX ADMIN — Medication 2 PUFF(S): at 13:16

## 2024-09-16 RX ADMIN — Medication 2 PUFF(S): at 21:58

## 2024-09-16 NOTE — PROGRESS NOTE ADULT - SUBJECTIVE AND OBJECTIVE BOX
NEPHROLOGY-[facility] [phone]        Patient seen and examined in bed.  He was the same         MEDICATIONS  (STANDING):  albuterol    90 MICROgram(s) HFA Inhaler 2 Puff(s) Inhalation every 8 hours  albuterol/ipratropium for Nebulization 3 milliLiter(s) Nebulizer every 6 hours  amLODIPine   Tablet 10 milliGRAM(s) Oral daily  atorvastatin 40 milliGRAM(s) Oral at bedtime  carvedilol 12.5 milliGRAM(s) Oral every 12 hours  dextrose 5%. 1000 milliLiter(s) (50 mL/Hr) IV Continuous <Continuous>  dextrose 5%. 1000 milliLiter(s) (100 mL/Hr) IV Continuous <Continuous>  dextrose 50% Injectable 25 Gram(s) IV Push once  dextrose 50% Injectable 25 Gram(s) IV Push once  dextrose 50% Injectable 12.5 Gram(s) IV Push once  furosemide    Tablet 40 milliGRAM(s) Oral daily  glucagon  Injectable 1 milliGRAM(s) IntraMuscular once  heparin   Injectable 5000 Unit(s) SubCutaneous every 12 hours  hydrALAZINE 25 milliGRAM(s) Oral three times a day  insulin lispro (ADMELOG) corrective regimen sliding scale   SubCutaneous every 6 hours  isosorbide   dinitrate Tablet (ISORDIL) 20 milliGRAM(s) Oral three times a day  melatonin 3 milliGRAM(s) Oral at bedtime  pancrelipase (VIOKACE) for Occluded enteral feeding tubes 1 Tablet(s) Enteral Tube once  pantoprazole   Suspension 40 milliGRAM(s) Enteral Tube daily  polyethylene glycol 3350 17 Gram(s) Oral daily  QUEtiapine 25 milliGRAM(s) Oral daily  QUEtiapine 50 milliGRAM(s) Oral at bedtime  sodium bicarbonate 650 milliGRAM(s) Oral three times a day  zinc oxide 20% Ointment 1 Application(s) Topical two times a day      VITAL:  T(C): , Max: 37.2 (09-16-24 @ 05:07)  T(F): , Max: 99 (09-16-24 @ 05:07)  HR: 74 (09-16-24 @ 08:54)  BP: 128/75 (09-16-24 @ 08:54)  BP(mean): --  RR: 18 (09-16-24 @ 08:54)  SpO2: 99% (09-16-24 @ 08:54)  Wt(kg): --    I and O's:    09-15 @ 07:01  -  09-16 @ 07:00  --------------------------------------------------------  IN: 1698 mL / OUT: 0 mL / NET: 1698 mL          PHYSICAL EXAM:    Constitutional: NAD  Neck:  No JVD  Respiratory: CTAB/L  Cardiovascular: S1 and S2  Gastrointestinal: BS+, soft, NT/ND  Extremities: No peripheral edema  Neurological: A/O x 3, right sided weakness   Psychiatric: Normal mood, normal affect  : No Naranjo  Skin: No rashes  Access: Not applicable    LABS:                        9.8    12.58 )-----------( 330      ( 15 Sep 2024 16:20 )             30.5                 Urine Studies:          RADIOLOGY & ADDITIONAL STUDIES:

## 2024-09-16 NOTE — CONSULT NOTE ADULT - CONSULT REQUESTED DATE/TIME
08-Sep-2024 16:20
08-Sep-2024
09-Sep-2024 09:44
16-Sep-2024 13:08
09-Sep-2024 13:35
08-Sep-2024 22:01
09-Sep-2024 13:34

## 2024-09-16 NOTE — PROGRESS NOTE ADULT - SUBJECTIVE AND OBJECTIVE BOX
date of service: 09-16-24 @ 06:20  afberile  REVIEW OF SYSTEMS:  CONSTITUTIONAL: No fever,  no  weight loss  ENT:  No  tinnitus,   no   vertigo  NECK: No pain or stiffness  RESPIRATORY: No cough, wheezing, chills or hemoptysis;    No Shortness of Breath  CARDIOVASCULAR: No chest pain, palpitations, dizziness  GASTROINTESTINAL: No abdominal or epigastric pain. No nausea, vomiting, or hematemesis; No diarrhea  No melena or hematochezia.  GENITOURINARY: No dysuria, frequency, hematuria, or incontinence  NEUROLOGICAL: No headaches  SKIN: No itching,  no   rash  LYMPH Nodes: No enlarged glands  ENDOCRINE: No heat or cold intolerance  MUSCULOSKELETAL: No joint pain or swelling  PSYCHIATRIC: No depression, anxiety  HEME/LYMPH: No easy bruising, or bleeding gums  ALLERGY AND IMMUNOLOGIC: No hives or eczema	    MEDICATIONS  (STANDING):  albuterol    90 MICROgram(s) HFA Inhaler 2 Puff(s) Inhalation every 8 hours  albuterol/ipratropium for Nebulization 3 milliLiter(s) Nebulizer every 6 hours  amLODIPine   Tablet 10 milliGRAM(s) Oral daily  atorvastatin 40 milliGRAM(s) Oral at bedtime  carvedilol 12.5 milliGRAM(s) Oral every 12 hours  dextrose 5%. 1000 milliLiter(s) (50 mL/Hr) IV Continuous <Continuous>  dextrose 5%. 1000 milliLiter(s) (100 mL/Hr) IV Continuous <Continuous>  dextrose 50% Injectable 12.5 Gram(s) IV Push once  dextrose 50% Injectable 25 Gram(s) IV Push once  dextrose 50% Injectable 25 Gram(s) IV Push once  furosemide    Tablet 40 milliGRAM(s) Oral daily  glucagon  Injectable 1 milliGRAM(s) IntraMuscular once  heparin   Injectable 5000 Unit(s) SubCutaneous every 12 hours  hydrALAZINE 25 milliGRAM(s) Oral three times a day  insulin glargine Injectable (LANTUS) 8 Unit(s) SubCutaneous at bedtime  insulin lispro (ADMELOG) corrective regimen sliding scale   SubCutaneous every 6 hours  isosorbide   dinitrate Tablet (ISORDIL) 20 milliGRAM(s) Oral three times a day  melatonin 3 milliGRAM(s) Oral at bedtime  pancrelipase (VIOKACE) for Occluded enteral feeding tubes 1 Tablet(s) Enteral Tube once  pantoprazole   Suspension 40 milliGRAM(s) Enteral Tube daily  polyethylene glycol 3350 17 Gram(s) Oral daily  QUEtiapine 50 milliGRAM(s) Oral at bedtime  QUEtiapine 25 milliGRAM(s) Oral daily  sodium bicarbonate 650 milliGRAM(s) Oral three times a day  zinc oxide 20% Ointment 1 Application(s) Topical two times a day    MEDICATIONS  (PRN):  acetaminophen   Oral Liquid .. 650 milliGRAM(s) Oral every 6 hours PRN Temp greater or equal to 38C (100.4F), Moderate Pain (4 - 6)  dextrose Oral Gel 15 Gram(s) Oral once PRN Blood Glucose LESS THAN 70 milliGRAM(s)/deciliter  haloperidol    Injectable 2 milliGRAM(s) IntraMuscular once PRN Agitation      Vital Signs Last 24 Hrs  T(C): 37.2 (16 Sep 2024 05:07), Max: 37.2 (16 Sep 2024 05:07)  T(F): 99 (16 Sep 2024 05:07), Max: 99 (16 Sep 2024 05:07)  HR: 78 (16 Sep 2024 05:07) (78 - 87)  BP: 143/76 (16 Sep 2024 05:07) (143/76 - 166/89)  BP(mean): --  RR: 18 (16 Sep 2024 05:07) (18 - 18)  SpO2: 97% (16 Sep 2024 05:07) (97% - 100%)    Parameters below as of 16 Sep 2024 05:07  Patient On (Oxygen Delivery Method): room air      CAPILLARY BLOOD GLUCOSE      POCT Blood Glucose.: 77 mg/dL (16 Sep 2024 05:56)  POCT Blood Glucose.: 77 mg/dL (16 Sep 2024 00:09)  POCT Blood Glucose.: 118 mg/dL (15 Sep 2024 22:36)  POCT Blood Glucose.: 222 mg/dL (15 Sep 2024 16:55)    I&O's Summary    14 Sep 2024 07:01  -  15 Sep 2024 07:00  --------------------------------------------------------  IN: 2172 mL / OUT: 11 mL / NET: 2161 mL    15 Sep 2024 07:01  -  16 Sep 2024 06:20  --------------------------------------------------------  IN: 1698 mL / OUT: 0 mL / NET: 1698 mL          Appearance: Normal	  HEENT:   Normal oral mucosa, PERRL, EOMI	  Lymphatic: No lymphadenopathy  Cardiovascular: Normal S1 S2, No JVD  Respiratory: Lungs clear to auscultation	  Gastrointestinal:  Soft, Non-tender, + BS	  Skin: No rash, No ecchymoses	  Extremities:     LABS:                        9.8    12.58 )-----------( 330      ( 15 Sep 2024 16:20 )             30.5                           Thyroid Stimulating Hormone, Serum: 2.73 uIU/mL (09-10 @ 07:22)          Consultant(s) Notes Reviewed:      Care Discussed with Consultants/Other Providers:

## 2024-09-16 NOTE — CONSULT NOTE ADULT - ASSESSMENT
A/P: 49-year-old male with past medical history of diabetes, hypertension, HLD. CVA with right sided hemiplegia, non verbal, dysphagia on PEG tube, recently treated pneumonia with meropenem (8/27-9/2) brought in by EMS from [facility] for difficulty breathing and hypoxia 85% RA, difficulty breathing per EMS and nursing home documentation.    Wound Consult requested to assist w/ management of  Incontinence associated dermatitis  Lt great to nail    Recommendations:   IAD: Jordyn BID and prn soiling   lt great toe paint with betadine, apply adaptic touch, 4x4, ranjan  Consider Podiatry for lt toe nail    Moisturize intact skin w/ SWEEN cream BID  Nutrition Consult for optimization in pt w/ Increased nutritional needs            encourage high quality protein, joel/ prosource, MVI & Vit C to promote wound healing  Continue turning and positioning w/ offloading assistive devices as per protocol         Continue w/ attends under pads and Pericare w/condom cath care as per protocol  Waffle Cushion to chair when oob to chair  Continue w/ low air loss pressure redistribution bed surface   Care as per medicine, will remain available as requested  If needed upon discharge f/u as outpatient at Wound Center [address] [place] [address] [phone]  Seen and D/w team & RN  Thank you for this consult,  STEPH Stark-BC, [facility]  [phone]  Nights/ Weekends/ Holidays please call:  General Surgery Consult pager [phone]) for emergencies  Wound PT for multilayer leg wrapping or VAC issues (x [phone])    A/P: 49-year-old male with past medical history of diabetes, hypertension, HLD. CVA with right sided hemiplegia, non verbal, dysphagia on PEG tube, recently treated pneumonia with meropenem (8/27-9/2) brought in by EMS from [facility] for difficulty breathing and hypoxia 85% RA, difficulty breathing per EMS and nursing home documentation.    Wound Consult requested to assist w/ management of  Incontinence associated dermatitis  Lt great to nail    Recommendations:   IAD: Jordyn BID and prn soiling   Podiatry for lt toe nail          paint with betadine, apply adaptic touch, 4x4, ranjan until POD orders care of toe    Moisturize intact skin w/ SWEEN cream BID  Nutrition Consult for optimization in pt w/ Increased nutritional needs            encourage high quality protein, joel/ prosource, MVI & Vit C to promote wound healing  Continue turning and positioning w/ offloading assistive devices as per protocol         Continue w/ attends under pads and Pericare w/condom cath care as per protocol  Waffle Cushion to chair when oob to chair  Continue w/ low air loss pressure redistribution bed surface   Care as per medicine, will remain available as requested  If needed upon discharge f/u as outpatient at Wound Center [address] [facility] [phone]  Seen and D/w team & RN  Thank you for this consult,  STEPH Stark-BC, [facility]  [phone]  Nights/ Weekends/ Holidays please call:  General Surgery Consult pager ([phone]) for emergencies  Wound PT for multilayer leg wrapping or VAC issues (x [phone])

## 2024-09-16 NOTE — PROGRESS NOTE ADULT - ASSESSMENT
49-year-old male h/o  HTN/ HLD. DM ,   CVA with right sided hemiplegia, non verbal, dysphagia ,has  PEG tube, recently treated pneumonia brought in by EMS from [facility] for difficulty breathing and hypoxia   per   nursing home , pt has   covid   QUITA        Assessment  Hyperglycemia: 49y Male with hyperglycemias, being started on tube feeds via PEG tube, A1c stable, not on DM meds, started on sliding scale now.  Glucose trending up on bolus tube feedings, basal insulin started. sugars trending down   COVID+: on medications, on isolation , monitored.  HTN: on antihypertensive medications, monitored, asymptomatic.    Discussed plan and management wit Dr Mau León MD  Cell: [phone]  Office: [phone]               49-year-old male h/o  HTN/ HLD. DM ,   CVA with right sided hemiplegia, non verbal, dysphagia ,has  PEG tube, recently treated pneumonia brought in by EMS from [facility] for difficulty breathing and hypoxia   per   nursing home , pt has   covid   QUITA    Assessment  Hyperglycemia: 49y Male with hyperglycemias, being started on tube feeds via PEG tube, A1c stable, not on DM meds, started on sliding scale now.  Glucose trending up on bolus tube feedings, basal insulin started. sugars trending down   COVID+: on medications, on isolation , monitored.  HTN: on antihypertensive medications, monitored, asymptomatic.    Discussed plan and management wit Dr Mau León MD  Cell: [phone]  Office: [phone]

## 2024-09-16 NOTE — PROGRESS NOTE ADULT - SUBJECTIVE AND OBJECTIVE BOX
Follow Up:  COVID    Interval History/ROS:  Overnight: No acute events.  Patient remains afebrile.  Otherwise hemodynamically stable on room air.  Latest labs show leukocytosis of 13.7, anemia 9.4/29.2, BMP with elevated creatinine of 2.1.  Blood cultures negative, urine culture negative.    Patient seen examined at bedside.  NonverbalAllergies  No Known Allergies        ANTIMICROBIALS:      OTHER MEDS:  MEDICATIONS  (STANDING):  acetaminophen   Oral Liquid .. 650 every 6 hours PRN  albuterol    90 MICROgram(s) HFA Inhaler 2 every 8 hours  albuterol/ipratropium for Nebulization 3 every 6 hours  amLODIPine   Tablet 10 daily  atorvastatin 40 at bedtime  carvedilol 12.5 every 12 hours  dextrose 50% Injectable 12.5 once  dextrose 50% Injectable 25 once  dextrose 50% Injectable 25 once  dextrose Oral Gel 15 once PRN  furosemide    Tablet 40 daily  glucagon  Injectable 1 once  haloperidol    Injectable 2 once PRN  heparin   Injectable 5000 every 12 hours  hydrALAZINE 25 three times a day  insulin lispro (ADMELOG) corrective regimen sliding scale  every 6 hours  isosorbide   dinitrate Tablet (ISORDIL) 20 three times a day  melatonin 3 at bedtime  pancrelipase (VIOKACE) for Occluded enteral feeding tubes 1 once  pantoprazole   Suspension 40 daily  polyethylene glycol 3350 17 daily  QUEtiapine 25 daily  QUEtiapine 50 at bedtime      Vital Signs Last 24 Hrs  T(C): 36.3 (16 Sep 2024 12:13), Max: 37.2 (16 Sep 2024 05:07)  T(F): 97.4 (16 Sep 2024 12:13), Max: 99 (16 Sep 2024 05:07)  HR: 66 (16 Sep 2024 12:13) (66 - 82)  BP: 122/71 (16 Sep 2024 12:13) (122/71 - 166/89)  BP(mean): --  RR: 18 (16 Sep 2024 12:13) (18 - 18)  SpO2: 100% (16 Sep 2024 12:13) (97% - 100%)    Parameters below as of 16 Sep 2024 12:13  Patient On (Oxygen Delivery Method): room air        PHYSICAL EXAM:    Constitutional: no distress, nonverbal  HEAD/EYES: anicteric, no conjunctival injection  ENT:  supple, no thrush  Cardiovascular:   normal S1, S2, no murmur, no edema  Respiratory:  clear BS bilaterally, no wheezes, no rales  GI:  soft, non-tender, normal bowel sounds  :  no taylor, no CVA tenderness  Musculoskeletal:  no synovitis, normal ROM  Skin:  no rash, no erythema, no phlebitis  Heme/Onc: no lymphadenopathy                                 9.4    13.77 )-----------( 341      ( 16 Sep 2024 11:12 )             29.2       09-16    138  |  106  |  58<H>  ----------------------------<  102<H>  5.0   |  22  |  2.14<H>    Ca    8.1<L>      16 Sep 2024 11:11        Urinalysis Basic - ( 16 Sep 2024 11:11 )    Color: x / Appearance: x / SG: x / pH: x  Gluc: 102 mg/dL / Ketone: x  / Bili: x / Urobili: x   Blood: x / Protein: x / Nitrite: x   Leuk Esterase: x / RBC: x / WBC x   Sq Epi: x / Non Sq Epi: x / Bacteria: x        MICROBIOLOGY:  v                  RADIOLOGY:  Imaging reviewed

## 2024-09-16 NOTE — PROGRESS NOTE ADULT - SUBJECTIVE AND OBJECTIVE BOX
Date of Service: 09-16-24 @ 07:39           CARDIOLOGY     PROGRESS  NOTE   ________________________________________________    CHIEF COMPLAINT:Patient is a 49y old  Male who presents with a chief complaint of SOB (16 Sep 2024 06:20)  comfortable  	  REVIEW OF SYSTEMS:  CONSTITUTIONAL: No fever, weight loss, or fatigue  EYES: No eye pain, visual disturbances, or discharge  ENT:  No difficulty hearing, tinnitus, vertigo; No sinus or throat pain  NECK: No pain or stiffness  RESPIRATORY: No cough, wheezing, chills or hemoptysis; No Shortness of Breath  CARDIOVASCULAR: No chest pain, palpitations, passing out, dizziness, or leg swelling  GASTROINTESTINAL: No abdominal or epigastric pain. No nausea, vomiting, or hematemesis; No diarrhea or constipation. No melena or hematochezia.  GENITOURINARY: No dysuria, frequency, hematuria, or incontinence  NEUROLOGICAL: No headaches, memory loss, loss of strength, numbness, or tremors  SKIN: No itching, burning, rashes, or lesions   LYMPH Nodes: No enlarged glands  ENDOCRINE: No heat or cold intolerance; No hair loss  MUSCULOSKELETAL: No joint pain or swelling; No muscle, back, or extremity pain  PSYCHIATRIC: No depression, anxiety, mood swings, or difficulty sleeping  HEME/LYMPH: No easy bruising, or bleeding gums  ALLERGY AND IMMUNOLOGIC: No hives or eczema	    [ ] All others negative	  [x ] Unable to obtain    PHYSICAL EXAM:  T(C): 37.2 (09-16-24 @ 05:07), Max: 37.2 (09-16-24 @ 05:07)  HR: 78 (09-16-24 @ 05:07) (78 - 87)  BP: 143/76 (09-16-24 @ 05:07) (143/76 - 166/89)  RR: 18 (09-16-24 @ 05:07) (18 - 18)  SpO2: 97% (09-16-24 @ 05:07) (97% - 100%)  Wt(kg): --  I&O's Summary    15 Sep 2024 07:01  -  16 Sep 2024 07:00  --------------------------------------------------------  IN: 1698 mL / OUT: 0 mL / NET: 1698 mL        Appearance: Normal	  HEENT:   Normal oral mucosa, PERRL, EOMI	  Lymphatic: No lymphadenopathy  Cardiovascular: Normal S1 S2, No JVD, + murmurs, No edema  Respiratory: rhonchi  Psychiatry: non verbal  Gastrointestinal:  Soft, Non-tender, + BS	  Skin: No rashes, No ecchymoses, No cyanosis	  Neurologic: Non-focal  Extremities: No clubbing, cyanosis or edema  Vascular: Peripheral pulses palpable 2+ bilaterally    MEDICATIONS  (STANDING):  albuterol    90 MICROgram(s) HFA Inhaler 2 Puff(s) Inhalation every 8 hours  albuterol/ipratropium for Nebulization 3 milliLiter(s) Nebulizer every 6 hours  amLODIPine   Tablet 10 milliGRAM(s) Oral daily  atorvastatin 40 milliGRAM(s) Oral at bedtime  carvedilol 12.5 milliGRAM(s) Oral every 12 hours  dextrose 5%. 1000 milliLiter(s) (50 mL/Hr) IV Continuous <Continuous>  dextrose 5%. 1000 milliLiter(s) (100 mL/Hr) IV Continuous <Continuous>  dextrose 50% Injectable 12.5 Gram(s) IV Push once  dextrose 50% Injectable 25 Gram(s) IV Push once  dextrose 50% Injectable 25 Gram(s) IV Push once  furosemide    Tablet 40 milliGRAM(s) Oral daily  glucagon  Injectable 1 milliGRAM(s) IntraMuscular once  heparin   Injectable 5000 Unit(s) SubCutaneous every 12 hours  hydrALAZINE 25 milliGRAM(s) Oral three times a day  insulin glargine Injectable (LANTUS) 8 Unit(s) SubCutaneous at bedtime  insulin lispro (ADMELOG) corrective regimen sliding scale   SubCutaneous every 6 hours  isosorbide   dinitrate Tablet (ISORDIL) 20 milliGRAM(s) Oral three times a day  melatonin 3 milliGRAM(s) Oral at bedtime  pancrelipase (VIOKACE) for Occluded enteral feeding tubes 1 Tablet(s) Enteral Tube once  pantoprazole   Suspension 40 milliGRAM(s) Enteral Tube daily  polyethylene glycol 3350 17 Gram(s) Oral daily  QUEtiapine 50 milliGRAM(s) Oral at bedtime  QUEtiapine 25 milliGRAM(s) Oral daily  sodium bicarbonate 650 milliGRAM(s) Oral three times a day  zinc oxide 20% Ointment 1 Application(s) Topical two times a day      TELEMETRY: 	    ECG:  	  RADIOLOGY:  OTHER: 	  	  LABS:	 	    CARDIAC MARKERS:                                9.8    12.58 )-----------( 330      ( 15 Sep 2024 16:20 )             30.5           proBNP:   Lipid Profile:   HgA1c:   TSH: Thyroid Stimulating Hormone, Serum: 2.73 uIU/mL (09-10 @ 07:22)          Assessment and plan  ---------------------------  49-year-old male with past medical history of diabetes, hypertension, HLD. CVA with right sided hemiplegia, non verbal, dysphagia on PEG tube, recently treated pneumonia with meropenem (8/27-9/2) brought in by EMS from [facility] for difficulty breathing and hypoxia 85% RA, difficulty breathing per EMS and nursing home documentation.  Patient unable to provide additional history.     pt with sig medical and cardiac hx with increasing sob, COVID +, recently treated with pneumoniae  check d dimer if elevated , vq scan to r/o PE  echo  ID eval RDV increase creatinine renal eval  pt with hx of PAF ?AC will discus with Dr Prasad  CAD/ ASHD continue cardiac meds will adjust  increase bp will adjust meds, may increase hydralazine dose  ID noted   CKD continue to observe , on sodium bicarb  renal ultrasound  dvt prophylaxis, check d dimer noted  awaiting echo  chest ct with moderate R  pleural effusion, awaiting echo, may need therapeutic and diagnostic thoracentesis  peg feeding as per medicine  may consider to dc ivf, free water via the peg  bp is better controlled with increase hydralazine dose  may add diuretics will discuss with renal  check lytes  still with increase bp, dc labetalol start on coreg 12.5 mg bid  bp is better controlled with Coreg, echo noted with EF 40%, not a candidate for aggressive measures  pleural effusion ?chf , Renal comments about adding diuretics, increase lasix dose  increase hydra or coreg for better bp control, bp is well controlled today  renal function needs to be followed closely. continue diuretics  fu the chest x ray in few weeks

## 2024-09-16 NOTE — PROGRESS NOTE ADULT - SUBJECTIVE AND OBJECTIVE BOX
Chief complaint  Patient is a 49y old  Male who presents with a chief complaint of SOB (16 Sep 2024 09:44)         Labs and Fingersticks  CAPILLARY BLOOD GLUCOSE      POCT Blood Glucose.: 106 mg/dL (16 Sep 2024 10:29)  POCT Blood Glucose.: 77 mg/dL (16 Sep 2024 05:56)  POCT Blood Glucose.: 77 mg/dL (16 Sep 2024 00:09)  POCT Blood Glucose.: 118 mg/dL (15 Sep 2024 22:36)  POCT Blood Glucose.: 222 mg/dL (15 Sep 2024 16:55)                                            9.4    13.77 )-----------( 341      ( 16 Sep 2024 11:12 )             29.2     Medications  MEDICATIONS  (STANDING):  albuterol    90 MICROgram(s) HFA Inhaler 2 Puff(s) Inhalation every 8 hours  albuterol/ipratropium for Nebulization 3 milliLiter(s) Nebulizer every 6 hours  amLODIPine   Tablet 10 milliGRAM(s) Oral daily  atorvastatin 40 milliGRAM(s) Oral at bedtime  carvedilol 12.5 milliGRAM(s) Oral every 12 hours  dextrose 5%. 1000 milliLiter(s) (50 mL/Hr) IV Continuous <Continuous>  dextrose 5%. 1000 milliLiter(s) (100 mL/Hr) IV Continuous <Continuous>  dextrose 50% Injectable 12.5 Gram(s) IV Push once  dextrose 50% Injectable 25 Gram(s) IV Push once  dextrose 50% Injectable 25 Gram(s) IV Push once  furosemide    Tablet 40 milliGRAM(s) Oral daily  glucagon  Injectable 1 milliGRAM(s) IntraMuscular once  heparin   Injectable 5000 Unit(s) SubCutaneous every 12 hours  hydrALAZINE 25 milliGRAM(s) Oral three times a day  insulin lispro (ADMELOG) corrective regimen sliding scale   SubCutaneous every 6 hours  isosorbide   dinitrate Tablet (ISORDIL) 20 milliGRAM(s) Oral three times a day  melatonin 3 milliGRAM(s) Oral at bedtime  pancrelipase (VIOKACE) for Occluded enteral feeding tubes 1 Tablet(s) Enteral Tube once  pantoprazole   Suspension 40 milliGRAM(s) Enteral Tube daily  polyethylene glycol 3350 17 Gram(s) Oral daily  QUEtiapine 50 milliGRAM(s) Oral at bedtime  QUEtiapine 25 milliGRAM(s) Oral daily  sodium bicarbonate 650 milliGRAM(s) Oral three times a day  zinc oxide 20% Ointment 1 Application(s) Topical two times a day      Physical Exam  General: Patient comfortable in bed   Vital Signs Last 12 Hrs  T(F): 97.5 (09-16-24 @ 08:54), Max: 99 (09-16-24 @ 05:07)  HR: 74 (09-16-24 @ 08:54) (74 - 80)  BP: 128/75 (09-16-24 @ 08:54) (128/75 - 144/78)  BP(mean): --  RR: 18 (09-16-24 @ 08:54) (18 - 18)  SpO2: 99% (09-16-24 @ 08:54) (97% - 99%)    CVS: S1S2   Respiratory: No wheezing, no crepitations  GI: Abdomen soft, bowel sounds positive  Musculoskeletal:  moves all extremities  : Voiding       Chief complaint  Patient is a 49y old  Male who presents with a chief complaint of SOB (16 Sep 2024 09:44)         Labs and Fingersticks  CAPILLARY BLOOD GLUCOSE      POCT Blood Glucose.: 106 mg/dL (16 Sep 2024 10:29)  POCT Blood Glucose.: 77 mg/dL (16 Sep 2024 05:56)  POCT Blood Glucose.: 77 mg/dL (16 Sep 2024 00:09)  POCT Blood Glucose.: 118 mg/dL (15 Sep 2024 22:36)  POCT Blood Glucose.: 222 mg/dL (15 Sep 2024 16:55)                                            9.4    13.77 )-----------( 341      ( 16 Sep 2024 11:12 )             29.2     Medications  MEDICATIONS  (STANDING):  albuterol    90 MICROgram(s) HFA Inhaler 2 Puff(s) Inhalation every 8 hours  albuterol/ipratropium for Nebulization 3 milliLiter(s) Nebulizer every 6 hours  amLODIPine   Tablet 10 milliGRAM(s) Oral daily  atorvastatin 40 milliGRAM(s) Oral at bedtime  carvedilol 12.5 milliGRAM(s) Oral every 12 hours  dextrose 5%. 1000 milliLiter(s) (50 mL/Hr) IV Continuous <Continuous>  dextrose 5%. 1000 milliLiter(s) (100 mL/Hr) IV Continuous <Continuous>  dextrose 50% Injectable 12.5 Gram(s) IV Push once  dextrose 50% Injectable 25 Gram(s) IV Push once  dextrose 50% Injectable 25 Gram(s) IV Push once  furosemide    Tablet 40 milliGRAM(s) Oral daily  glucagon  Injectable 1 milliGRAM(s) IntraMuscular once  heparin   Injectable 5000 Unit(s) SubCutaneous every 12 hours  hydrALAZINE 25 milliGRAM(s) Oral three times a day  insulin lispro (ADMELOG) corrective regimen sliding scale   SubCutaneous every 6 hours  isosorbide   dinitrate Tablet (ISORDIL) 20 milliGRAM(s) Oral three times a day  melatonin 3 milliGRAM(s) Oral at bedtime  pancrelipase (VIOKACE) for Occluded enteral feeding tubes 1 Tablet(s) Enteral Tube once  pantoprazole   Suspension 40 milliGRAM(s) Enteral Tube daily  polyethylene glycol 3350 17 Gram(s) Oral daily  QUEtiapine 50 milliGRAM(s) Oral at bedtime  QUEtiapine 25 milliGRAM(s) Oral daily  sodium bicarbonate 650 milliGRAM(s) Oral three times a day  zinc oxide 20% Ointment 1 Application(s) Topical two times a day      Physical Exam  General: Patient comfortable in bed   Vital Signs Last 12 Hrs  T(F): 97.5 (09-16-24 @ 08:54), Max: 99 (09-16-24 @ 05:07)  HR: 74 (09-16-24 @ 08:54) (74 - 80)  BP: 128/75 (09-16-24 @ 08:54) (128/75 - 144/78)  BP(mean): --  RR: 18 (09-16-24 @ 08:54) (18 - 18)  SpO2: 99% (09-16-24 @ 08:54) (97% - 99%)    CVS: S1S2   Respiratory: No wheezing, no crepitations  GI: Abdomen soft, bowel sounds positive  Musculoskeletal:  moves all extremities  : Voiding

## 2024-09-16 NOTE — PROGRESS NOTE ADULT - ASSESSMENT
49-year-old male with past medical history significant for diabetes, hypertension, CVA, hemiplegia with recent hospitalization for pneumonia undergoing course of meropenem who was admitted to the hospital from nursing home due to shortness of breath.    #Acute hypoxic respiratory failure secondary to COVID-19  #QUITA  #Pyuria  RRT on 9/9 AM  CT chest with GGO bilateral, no consolidation, effusions noted as well    Recommendations  Continue dexamethasone  Complete 10-day dexamethasone course  No evidence for bacterial pneumonia on exam/imaging, possible aspiration pneumonitis  Stable off antibiotics  Continue to monitor off  Leukocytosis likely secondary to steroid use  Monitor IVs, no evidence for phlebitis at this time  Maintain strict resolution precautions  Wean oxygen as able  Would not treat pyuria noted in UA  Follow fever curve and WBC count    ID to sign off. Please contact as issues arise.    Azael Acosta MD  Division of Infectious Diseases

## 2024-09-16 NOTE — PROGRESS NOTE ADULT - ASSESSMENT
49-year-old male with past medical history of diabetes, hypertension, HLD. CVA with right sided hemiplegia, non verbal, dysphagia on PEG tube, recently treated pneumonia with meropenem (8/27-9/2) brought in by EMS from [facility] for difficulty breathing and hypoxia 85% RA, difficulty breathing per EMS and nursing home documentation. found to have COVID and QUITA vs advanced CKD. Nephrology consulted for QUITA vs CKD    Suspect QUITA on CKD vs advanced CKD  - Nephrotic syndrome   -QUITA 2/2 prerenal azotemia? vs chronicity       COVID virus  -on RDV    dysphagia  -s/p PEG    CVA  -R sided hemiplegia    RECOMMEND:  - continue Lasix 40 po daily now ;    - bicarb 650 TID through peg.    - SP Epogen 07717 unit x 1   -Dose Rx for CrCl 20-25mL/min;     - GIven overall state I suspect ARB + SGLT2 would be warranted here when the creatinine is stable - This can be started at his rehab as well and the creatinine will need to be trended   - BMP daily          Sayed [facility]   [phone]

## 2024-09-16 NOTE — CONSULT NOTE ADULT - SUBJECTIVE AND OBJECTIVE BOX
Wound SURGERY CONSULT NOTE    HPI:  49-year-old male with past medical history of diabetes, hypertension, HLD. CVA with right sided hemiplegia, non verbal, dysphagia on PEG tube, recently treated pneumonia with meropenem (8/27-9/2) brought in by EMS from [facility] for difficulty breathing and hypoxia 85% RA, difficulty breathing per EMS and nursing home documentation.  Patient unable to provide additional history.   (08 Sep 2024 05:36)      Wound consult requested by team to assist w/ management of skin injury.   Pt unable to  c/o pain, drainage, odor, color change,  or worsening swelling. Offloading and pericare initiated upon admission as pt Increasingly sedentary 2/2 to illness. Pt is Incontinent of urine & stool. (+) condom catheter.     Current Diet: Diet, NPO with Tube Feed:   Tube Feeding Modality: Gastrostomy  Glucerna 1.2 Jairo (GLUCERNARTH)  Total Volume for 24 Hours (mL): 1422  Bolus  Total Volume of Bolus (mL):  237  Total # of Feeds: 6  Tube Feed Frequency: Every 4 hours   Tube Feed Start Time: 00:00  Bolus Feed Rate (mL per Hour): 237   Bolus Feed Duration (in Hours): 1  Bolus Feed Instructions:  Provide 6 cans (1 can=237 ml) Glucerna 1.2 daily.  Free Water Flush  Bolus   Total Volume per Flush (mL): 250   Frequency: Every 8 Hours  Free Water Flush Instructions:  defer to team (09-13-24 @ 09:43)      PAST MEDICAL & SURGICAL HISTORY:  CVA (cerebrovascular accident)      HTN (hypertension)      PAF (paroxysmal atrial fibrillation)      Type 2 diabetes mellitus      Stage 3 chronic kidney disease      NSTEMI (non-ST elevation myocardial infarction)      History of pleural effusion      S/P percutaneous endoscopic gastrostomy (PEG) tube placement      AGUSTIN (iron deficiency anemia)      Anxiety and depression      Aphasia      HLD (hyperlipidemia)      S/P percutaneous endoscopic gastrostomy (PEG) tube placement        REVIEW OF SYSTEMS: Pt unable to offer      MEDICATIONS  (STANDING):  albuterol    90 MICROgram(s) HFA Inhaler 2 Puff(s) Inhalation every 8 hours  albuterol/ipratropium for Nebulization 3 milliLiter(s) Nebulizer every 6 hours  amLODIPine   Tablet 10 milliGRAM(s) Oral daily  atorvastatin 40 milliGRAM(s) Oral at bedtime  carvedilol 12.5 milliGRAM(s) Oral every 12 hours  dextrose 5%. 1000 milliLiter(s) (50 mL/Hr) IV Continuous <Continuous>  dextrose 5%. 1000 milliLiter(s) (100 mL/Hr) IV Continuous <Continuous>  dextrose 50% Injectable 12.5 Gram(s) IV Push once  dextrose 50% Injectable 25 Gram(s) IV Push once  dextrose 50% Injectable 25 Gram(s) IV Push once  furosemide    Tablet 40 milliGRAM(s) Oral daily  glucagon  Injectable 1 milliGRAM(s) IntraMuscular once  heparin   Injectable 5000 Unit(s) SubCutaneous every 12 hours  hydrALAZINE 25 milliGRAM(s) Oral three times a day  insulin lispro (ADMELOG) corrective regimen sliding scale   SubCutaneous every 6 hours  isosorbide   dinitrate Tablet (ISORDIL) 20 milliGRAM(s) Oral three times a day  melatonin 3 milliGRAM(s) Oral at bedtime  pancrelipase (VIOKACE) for Occluded enteral feeding tubes 1 Tablet(s) Enteral Tube once  pantoprazole   Suspension 40 milliGRAM(s) Enteral Tube daily  polyethylene glycol 3350 17 Gram(s) Oral daily  QUEtiapine 50 milliGRAM(s) Oral at bedtime  QUEtiapine 25 milliGRAM(s) Oral daily  sodium bicarbonate 650 milliGRAM(s) Oral three times a day  zinc oxide 20% Ointment 1 Application(s) Topical two times a day    MEDICATIONS  (PRN):  acetaminophen   Oral Liquid .. 650 milliGRAM(s) Oral every 6 hours PRN Temp greater or equal to 38C (100.4F), Moderate Pain (4 - 6)  dextrose Oral Gel 15 Gram(s) Oral once PRN Blood Glucose LESS THAN 70 milliGRAM(s)/deciliter  haloperidol    Injectable 2 milliGRAM(s) IntraMuscular once PRN Agitation      Allergies    No Known Allergies    Intolerances        SOCIAL HISTORY:      Unable to obtain    FAMILY HISTORY:    No pertinent family hx among first degree relatives.    PHYSICAL EXAM:  Vital Signs Last 24 Hrs  T(C): 36.3 (16 Sep 2024 12:13), Max: 37.2 (16 Sep 2024 05:07)  T(F): 97.4 (16 Sep 2024 12:13), Max: 99 (16 Sep 2024 05:07)  HR: 66 (16 Sep 2024 12:13) (66 - 83)  BP: 122/71 (16 Sep 2024 12:13) (122/71 - 166/89)  BP(mean): --  RR: 18 (16 Sep 2024 12:13) (18 - 18)  SpO2: 100% (16 Sep 2024 12:13) (97% - 100%)    Parameters below as of 16 Sep 2024 12:13  Patient On (Oxygen Delivery Method): room air        NAD/ Confused/ thin  Versa Care P500 bed  HEENT: sclera clear, mucosa moist, throat clear, trachea midline, neck supple  Respiratory: nonlabored w/ equal chest rise  Gastrointestinal: soft NT/ND  (+)PEG  : (+) condom cath  Neurology: nonverbal, can not follow commands  Psych: easily agitated  Musculoskeletal:  limited stiff no deformities/ contractures  Vascular: BLE equally warm, no cyanosis, clubbing, edema nor acute ischemia           BLE DP pulses palpable         Rt 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 site      Lt hallux toenail blue with erythema              no blistering, drainage, increased warmth, induration, fluctuance, nor crepitus  Skin:  moist w/ good turgor    Bilateral buttocks, sacral region blanchable erythema     there is no blistering, drainage increased warmth, induration, fluctuance, nor crepitus    LABS/ CULTURES/ RADIOLOGY:                        9.4    13.77 )-----------( 341      ( 16 Sep 2024 11:12 )             29.2       138  |  106  |  58  ----------------------------<  102      [09-16-24 @ 11:11]  5.0   |  22  |  2.14        Ca     8.1     [09-16-24 @ 11:11]              A1C with Estimated Average Glucose Result: 5.2 % (09-09-24 @ 08:15)

## 2024-09-16 NOTE — PROGRESS NOTE ADULT - ASSESSMENT
49-year-old male     h/o  HTN/ HLD. DM ,   CVA with right sided hemiplegia, non verbal, dysphagia ,has  PEG tube, recently treated pneumonia with meropenem (8/27-9/2) brought in by EMS from [facility] for difficulty breathing and hypoxia 85%   per   nursing home , pt has   cpvid   QUITA      sob/  hypoxia,  from covid/ pna/  and  chf/ acute  diastolic     prior  h/o  pna's/  suspect  pt has   c/c  aspiration// pna/  cxr . infection  vx  fluid  overload       on   remdidvir/  decadron/   was  on  iv zosyn    per  id  dr raya bedoya,  no  sepsis,  and  no  need  for  ab     gi  eval.    QUITA.  f/p  by enrrique;l  d r ali      CVA,  non verbal, /  r hemiplegia,  bed  bound nal  quadriplegia         follow  fs.  endo  d r millan     quita, resolving     c/c  aspiration. despite  peg      ct ca/p.  mild  goo,  r pl  effusion,  on iv lasix.  fluid  overload. ?  acute   systolic   chf/   seen by  pulm/     echo  ,  ef  40,      dermatitis/  moisture, on back. on zinx c oxide/  fungal  discolore d toes,  no rx     meds  adjusted  by card ,  , on coreg. / pt not  an ideal  candidate  for  intervention    dvt  ppx     pe r floor,  n/ home  will  only  accept  at a  certain  date,  due  to covid     follow  wbc/   d/c  plans        pt  is  prior dbr/dni/  epr  n/home paper            49-year-old male     h/o  HTN/ HLD. DM ,   CVA with right sided hemiplegia, non verbal, dysphagia ,has  PEG tube, recently treated pneumonia with meropenem (8/27-9/2) brought in by EMS from [facility] for difficulty breathing and hypoxia 85%   per   nursing home , pt has   cpvid   QUITA      sob/  hypoxia,  from covid/ pna/  and  chf/ acute  diastolic     prior  h/o  pna's/  suspect  pt has   c/c  aspiration// pna/  cxr . infection  vx  fluid  overload       on   remdidvir/  decadron/   was  on  iv zosyn    per  id  dr raya bedoya,  no  sepsis,  and  no  need  for  ab     gi  eval.    QUITA.  f/p  by enrrique;l  d r ali      CVA,  non verbal, /  r hemiplegia,  bed  bound nal  quadriplegia         follow  fs.  endo  d r millan     quita, resolving     c/c  aspiration. despite  peg      ct ca/p.  mild  goo,  r pl  effusion,  on iv lasix.  fluid  overload. ?  acute   systolic   chf/   seen by  pulm/     echo  ,  ef  40,      dermatitis/  moisture, on back. on zinx c oxide/  fungal  discolore d toes,  no rx     meds  adjusted  by card ,  , on coreg. / pt not  an ideal  candidate  for  intervention    lantus  stopped.  follwo  fs    dvt  ppx     pe r floor,  n/ home  will  only  accept  at a  certain  date,  due  to covid     follow  wbc/   d/c  plans        pt  is  prior dbr/dni/  epr  n/home paper            49-year-old male     h/o  HTN/ HLD. DM ,   CVA with right sided hemiplegia, non verbal, dysphagia ,has  PEG tube, recently treated pneumonia with meropenem (8/27-9/2) brought in by EMS from [facility] for difficulty breathing and hypoxia 85%   per   nursing home , pt has   cpvid   QUITA      sob/  hypoxia,  from covid/ pna/  and  chf/ acute  diastolic     prior  h/o  pna's/  suspect  pt has   c/c  aspiration// pna/  cxr . infection  vx  fluid  overload       on   remdidvir/  decadron/   was  on  iv zosyn    per  id  dr raya bedoya,  no  sepsis,  and  no  need  for  ab     gi  eval.    QUITA.  f/p  by enrrique;l  d r ali      CVA,  non verbal, /  r hemiplegia,  bed  bound nal  quadriplegia         follow  fs.  endo  d r millan     quita, resolving     c/c  aspiration. despite  peg      ct ca/p.  mild  goo,  r pl  effusion,  on iv lasix.  fluid  overload. ?  acute   systolic   chf/   seen by  pulm/     echo  ,  ef  40,      dermatitis/  moisture, on back. on zinx c oxide/  fungal  discolore d toes,  no rx     meds  adjusted  by card ,  , on coreg. / pt not  an ideal  candidate  for  intervention    lantus  stopped.  follwo  fs    dvt  ppx     per  floor,  n/ home  will  only  accept  at a  certain  date,  due  to covid     follow  wbc,  labs  pending        pt  is  prior dbr/dni/  epr  n/home paper            49-year-old male     h/o  HTN/ HLD. DM ,   CVA with right sided hemiplegia, non verbal, dysphagia ,has  PEG tube, recently treated pneumonia with meropenem (8/27-9/2) brought in by EMS from [facility] for difficulty breathing and hypoxia 85%   per   nursing home , pt has   cpvid   QUITA      sob/  hypoxia,  from covid/ pna/  and  chf/ acute  diastolic     prior  h/o  pna's/  suspect  pt has   c/c  aspiration// pna/  cxr . infection  vx  fluid  overload       on   remdidvir/  decadron/   was  on  iv zosyn    per  id  dr raya bedoya,  no  sepsis,  and  no  need  for  ab     gi  eval.    QUITA.  f/p  by enrrique;l  d r ali      CVA,  non verbal, /  r hemiplegia,  bed  bound nal  quadriplegia         follow  fs.  endo  d r millan     quita, resolving     c/c  aspiration. despite  peg      ct ca/p.  mild  goo,  r pl  effusion,  on iv lasix.  fluid  overload. ?  acute   systolic   chf/   seen by  pulm/     echo  ,  ef  40,      dermatitis/  moisture, on back. on zinx c oxide/  fungal  discolore d toes,  no rx     meds  adjusted  by card ,  , on coreg. / pt not  an ideal  candidate  for  intervention    lantus  stopped.  follwo  fs    dvt  ppx     per  floor,  n/ home  will  only  accept  at a  certain  date,  due  to covid     wbc  noted/  will  have  id  f/p    d/c  plans  in progress        pt  is  prior dbr/dni/  epr  n/home paper            49-year-old male     h/o  HTN/ HLD. DM ,   CVA with right sided hemiplegia, non verbal, dysphagia ,has  PEG tube, recently treated pneumonia with meropenem (8/27-9/2) brought in by EMS from [facility] for difficulty breathing and hypoxia 85%   per   nursing home , pt has   cpvid   QUITA      sob/  hypoxia,  from covid/ pna/  and  chf/ acute  diastolic     prior  h/o  pna's/  suspect  pt has   c/c  aspiration// pna/  cxr . infection  vx  fluid  overload       on   remdidvir/  decadron/   was  on  iv zosyn    per  id  dr raya bedoya,  no  sepsis,  and  no  need  for  ab     gi  eval.    QUITA.  f/p  by enrrique;l  d r ali      CVA,  non verbal, /  r hemiplegia,  bed  bound nal  quadriplegia         follow  fs.  endo  d r millan     quita, resolving     c/c  aspiration. despite  peg      ct ca/p.  mild  goo,  r pl  effusion,  on iv lasix.  fluid  overload. ?  acute   systolic   chf/   seen by  pulm/     echo  ,  ef  40,      dermatitis/  moisture, on back. on zinx c oxide/  fungal  discolore d toes,  no rx     meds  adjusted  by card ,  , on coreg. / pt not  an ideal  candidate  for  intervention    lantus  stopped.  follwo  fs    dvt  ppx     per  floor,  n/ home  will  only  accept  at a  certain  date,  due  to covid     wbc  noted/  will  have  id  f/p  and  has  been cleraed  by  id  fro  d/c    d/c  plans  in progress        pt  is  prior dbr/dni/  epr  n/home paper

## 2024-09-17 LAB
ANION GAP SERPL CALC-SCNC: 12 MMOL/L — SIGNIFICANT CHANGE UP (ref 5–17)
BUN SERPL-MCNC: 62 MG/DL — HIGH (ref 7–23)
CALCIUM SERPL-MCNC: 8.1 MG/DL — LOW (ref 8.4–10.5)
CHLORIDE SERPL-SCNC: 103 MMOL/L — SIGNIFICANT CHANGE UP (ref 96–108)
CO2 SERPL-SCNC: 22 MMOL/L — SIGNIFICANT CHANGE UP (ref 22–31)
CREAT SERPL-MCNC: 2.2 MG/DL — HIGH (ref 0.5–1.3)
EGFR: 36 ML/MIN/1.73M2 — LOW
GLUCOSE BLDC GLUCOMTR-MCNC: 112 MG/DL — HIGH (ref 70–99)
GLUCOSE BLDC GLUCOMTR-MCNC: 152 MG/DL — HIGH (ref 70–99)
GLUCOSE BLDC GLUCOMTR-MCNC: 93 MG/DL — SIGNIFICANT CHANGE UP (ref 70–99)
GLUCOSE SERPL-MCNC: 89 MG/DL — SIGNIFICANT CHANGE UP (ref 70–99)
HCT VFR BLD CALC: 28.9 % — LOW (ref 39–50)
HGB BLD-MCNC: 9.3 G/DL — LOW (ref 13–17)
MCHC RBC-ENTMCNC: 28.3 PG — SIGNIFICANT CHANGE UP (ref 27–34)
MCHC RBC-ENTMCNC: 32.2 GM/DL — SIGNIFICANT CHANGE UP (ref 32–36)
MCV RBC AUTO: 87.8 FL — SIGNIFICANT CHANGE UP (ref 80–100)
NRBC # BLD: 0 /100 WBCS — SIGNIFICANT CHANGE UP (ref 0–0)
OB PNL STL: NEGATIVE — SIGNIFICANT CHANGE UP
PLATELET # BLD AUTO: 335 K/UL — SIGNIFICANT CHANGE UP (ref 150–400)
POTASSIUM SERPL-MCNC: 5.4 MMOL/L — HIGH (ref 3.5–5.3)
POTASSIUM SERPL-SCNC: 5.4 MMOL/L — HIGH (ref 3.5–5.3)
RBC # BLD: 3.29 M/UL — LOW (ref 4.2–5.8)
RBC # FLD: 14.7 % — HIGH (ref 10.3–14.5)
SODIUM SERPL-SCNC: 137 MMOL/L — SIGNIFICANT CHANGE UP (ref 135–145)
WBC # BLD: 11.68 K/UL — HIGH (ref 3.8–10.5)
WBC # FLD AUTO: 11.68 K/UL — HIGH (ref 3.8–10.5)

## 2024-09-17 RX ORDER — SODIUM ZIRCONIUM CYCLOSILICATE 5 G/5G
5 POWDER, FOR SUSPENSION ORAL ONCE
Refills: 0 | Status: COMPLETED | OUTPATIENT
Start: 2024-09-17 | End: 2024-09-17

## 2024-09-17 RX ADMIN — IPRATROPIUM BROMIDE AND ALBUTEROL SULFATE 3 MILLILITER(S): .5; 3 SOLUTION RESPIRATORY (INHALATION) at 13:12

## 2024-09-17 RX ADMIN — SODIUM ZIRCONIUM CYCLOSILICATE 5 GRAM(S): 5 POWDER, FOR SUSPENSION ORAL at 10:29

## 2024-09-17 RX ADMIN — ZINC OXIDE 1 APPLICATION(S): 100 OINTMENT TOPICAL at 21:07

## 2024-09-17 RX ADMIN — IPRATROPIUM BROMIDE AND ALBUTEROL SULFATE 3 MILLILITER(S): .5; 3 SOLUTION RESPIRATORY (INHALATION) at 17:07

## 2024-09-17 RX ADMIN — Medication 25 MILLIGRAM(S): at 05:16

## 2024-09-17 RX ADMIN — AMLODIPINE BESYLATE 10 MILLIGRAM(S): 10 TABLET ORAL at 05:16

## 2024-09-17 RX ADMIN — Medication 50 MILLIGRAM(S): at 21:07

## 2024-09-17 RX ADMIN — SODIUM BICARBONATE 650 MILLIGRAM(S): 84 INJECTION, SOLUTION INTRAVENOUS at 05:16

## 2024-09-17 RX ADMIN — Medication 20 MILLIGRAM(S): at 13:13

## 2024-09-17 RX ADMIN — Medication 25 MILLIGRAM(S): at 13:13

## 2024-09-17 RX ADMIN — Medication 40 MILLIGRAM(S): at 05:16

## 2024-09-17 RX ADMIN — Medication 5000 UNIT(S): at 05:16

## 2024-09-17 RX ADMIN — IPRATROPIUM BROMIDE AND ALBUTEROL SULFATE 3 MILLILITER(S): .5; 3 SOLUTION RESPIRATORY (INHALATION) at 23:14

## 2024-09-17 RX ADMIN — Medication 2 PUFF(S): at 13:14

## 2024-09-17 RX ADMIN — IPRATROPIUM BROMIDE AND ALBUTEROL SULFATE 3 MILLILITER(S): .5; 3 SOLUTION RESPIRATORY (INHALATION) at 05:17

## 2024-09-17 RX ADMIN — CARVEDILOL 12.5 MILLIGRAM(S): 6.25 TABLET ORAL at 05:16

## 2024-09-17 RX ADMIN — Medication 1: at 13:16

## 2024-09-17 RX ADMIN — Medication 40 MILLIGRAM(S): at 21:07

## 2024-09-17 RX ADMIN — Medication 40 MILLIGRAM(S): at 13:13

## 2024-09-17 RX ADMIN — Medication 1 APPLICATION(S): at 13:18

## 2024-09-17 RX ADMIN — ZINC OXIDE 1 APPLICATION(S): 100 OINTMENT TOPICAL at 10:29

## 2024-09-17 RX ADMIN — Medication 5000 UNIT(S): at 17:07

## 2024-09-17 RX ADMIN — SODIUM BICARBONATE 650 MILLIGRAM(S): 84 INJECTION, SOLUTION INTRAVENOUS at 13:13

## 2024-09-17 RX ADMIN — CARVEDILOL 12.5 MILLIGRAM(S): 6.25 TABLET ORAL at 17:07

## 2024-09-17 RX ADMIN — Medication 3 MILLIGRAM(S): at 21:07

## 2024-09-17 RX ADMIN — Medication 20 MILLIGRAM(S): at 21:07

## 2024-09-17 RX ADMIN — SODIUM BICARBONATE 650 MILLIGRAM(S): 84 INJECTION, SOLUTION INTRAVENOUS at 21:06

## 2024-09-17 RX ADMIN — Medication 25 MILLIGRAM(S): at 21:07

## 2024-09-17 RX ADMIN — Medication 2 PUFF(S): at 05:17

## 2024-09-17 RX ADMIN — Medication 25 MILLIGRAM(S): at 10:29

## 2024-09-17 RX ADMIN — Medication 2 PUFF(S): at 21:08

## 2024-09-17 RX ADMIN — Medication 20 MILLIGRAM(S): at 05:16

## 2024-09-17 NOTE — PROGRESS NOTE ADULT - ASSESSMENT
49-year-old male with past medical history of diabetes, hypertension, HLD. CVA with right sided hemiplegia, non verbal, dysphagia on PEG tube, recently treated pneumonia with meropenem (8/27-9/2) brought in by EMS from [facility] for difficulty breathing and hypoxia 85% RA, difficulty breathing per EMS and nursing home documentation. found to have COVID and QUITA vs advanced CKD. Nephrology consulted for QUITA vs CKD      CKD stage 3b   - Nephrotic syndrome   -Hyperkalemia      COVID virus     dysphagia  -s/p PEG      CVA  -R sided hemiplegia    RECOMMEND:  - continue Lasix 40 po daily now ;  Lokelma 5mg po qd x 1   - bicarb 650 TID through peg.    - Epogen 66676 unit x 1   -Dose Rx for CrCl 20-25mL/min;     - GIven overall state I suspect ARB + SGLT2 would be warranted here when the creatinine is stable - This can be started at his rehab as well and the creatinine will need to be trended        No renal objection to dc       Sayed [facility]   [phone]

## 2024-09-17 NOTE — PROGRESS NOTE ADULT - SUBJECTIVE AND OBJECTIVE BOX
Chief complaint  Patient is a 49y old  Male who presents with a chief complaint of SOB (17 Sep 2024 09:20)         Labs and Fingersticks  CAPILLARY BLOOD GLUCOSE      POCT Blood Glucose.: 152 mg/dL (17 Sep 2024 12:10)  POCT Blood Glucose.: 93 mg/dL (17 Sep 2024 05:52)  POCT Blood Glucose.: 109 mg/dL (16 Sep 2024 23:56)  POCT Blood Glucose.: 94 mg/dL (16 Sep 2024 16:59)      Anion Gap: 12 (09-17 @ 06:41)  Anion Gap: 10 (09-16 @ 11:11)      Calcium: 8.1 *L* (09-17 @ 06:41)  Calcium: 8.1 *L* (09-16 @ 11:11)          09-17    137  |  103  |  62[H]  ----------------------------<  89  5.4[H]   |  22  |  2.20[H]    Ca    8.1[L]      17 Sep 2024 06:41                          9.3    11.68 )-----------( 335      ( 17 Sep 2024 06:41 )             28.9     Medications  MEDICATIONS  (STANDING):  albuterol    90 MICROgram(s) HFA Inhaler 2 Puff(s) Inhalation every 8 hours  albuterol/ipratropium for Nebulization 3 milliLiter(s) Nebulizer every 6 hours  amLODIPine   Tablet 10 milliGRAM(s) Oral daily  atorvastatin 40 milliGRAM(s) Oral at bedtime  carvedilol 12.5 milliGRAM(s) Oral every 12 hours  dextrose 5%. 1000 milliLiter(s) (100 mL/Hr) IV Continuous <Continuous>  dextrose 5%. 1000 milliLiter(s) (50 mL/Hr) IV Continuous <Continuous>  dextrose 50% Injectable 25 Gram(s) IV Push once  dextrose 50% Injectable 25 Gram(s) IV Push once  dextrose 50% Injectable 12.5 Gram(s) IV Push once  furosemide    Tablet 40 milliGRAM(s) Oral daily  glucagon  Injectable 1 milliGRAM(s) IntraMuscular once  heparin   Injectable 5000 Unit(s) SubCutaneous every 12 hours  hydrALAZINE 25 milliGRAM(s) Oral three times a day  insulin lispro (ADMELOG) corrective regimen sliding scale   SubCutaneous every 6 hours  isosorbide   dinitrate Tablet (ISORDIL) 20 milliGRAM(s) Oral three times a day  melatonin 3 milliGRAM(s) Oral at bedtime  pancrelipase (VIOKACE) for Occluded enteral feeding tubes 1 Tablet(s) Enteral Tube once  pantoprazole   Suspension 40 milliGRAM(s) Enteral Tube daily  polyethylene glycol 3350 17 Gram(s) Oral daily  povidone iodine 10% Solution 1 Application(s) Topical daily  QUEtiapine 50 milliGRAM(s) Oral at bedtime  QUEtiapine 25 milliGRAM(s) Oral daily  sodium bicarbonate 650 milliGRAM(s) Oral three times a day  zinc oxide 20% Ointment 1 Application(s) Topical two times a day      Physical Exam  General: Patient comfortable in bed  Vital Signs Last 12 Hrs  T(F): 97.9 (09-17-24 @ 08:52), Max: 97.9 (09-17-24 @ 04:47)  HR: 77 (09-17-24 @ 08:52) (77 - 85)  BP: 117/72 (09-17-24 @ 08:52) (117/72 - 147/83)  BP(mean): 83 (09-17-24 @ 04:47) (83 - 83)  RR: 18 (09-17-24 @ 08:52) (18 - 18)  SpO2: 96% (09-17-24 @ 08:52) (96% - 99%)    CVS: S1S2   Respiratory: No wheezing, no crepitations  GI: Abdomen soft, bowel sounds positive  Musculoskeletal:  moves all extremities  : Voiding         Chief complaint  Patient is a 49y old  Male who presents with a chief complaint of SOB (17 Sep 2024 09:20)     Labs and Fingersticks  CAPILLARY BLOOD GLUCOSE      POCT Blood Glucose.: 152 mg/dL (17 Sep 2024 12:10)  POCT Blood Glucose.: 93 mg/dL (17 Sep 2024 05:52)  POCT Blood Glucose.: 109 mg/dL (16 Sep 2024 23:56)  POCT Blood Glucose.: 94 mg/dL (16 Sep 2024 16:59)      Anion Gap: 12 (09-17 @ 06:41)  Anion Gap: 10 (09-16 @ 11:11)      Calcium: 8.1 *L* (09-17 @ 06:41)  Calcium: 8.1 *L* (09-16 @ 11:11)          09-17    137  |  103  |  62[H]  ----------------------------<  89  5.4[H]   |  22  |  2.20[H]    Ca    8.1[L]      17 Sep 2024 06:41                          9.3    11.68 )-----------( 335      ( 17 Sep 2024 06:41 )             28.9     Medications  MEDICATIONS  (STANDING):  albuterol    90 MICROgram(s) HFA Inhaler 2 Puff(s) Inhalation every 8 hours  albuterol/ipratropium for Nebulization 3 milliLiter(s) Nebulizer every 6 hours  amLODIPine   Tablet 10 milliGRAM(s) Oral daily  atorvastatin 40 milliGRAM(s) Oral at bedtime  carvedilol 12.5 milliGRAM(s) Oral every 12 hours  dextrose 5%. 1000 milliLiter(s) (100 mL/Hr) IV Continuous <Continuous>  dextrose 5%. 1000 milliLiter(s) (50 mL/Hr) IV Continuous <Continuous>  dextrose 50% Injectable 25 Gram(s) IV Push once  dextrose 50% Injectable 25 Gram(s) IV Push once  dextrose 50% Injectable 12.5 Gram(s) IV Push once  furosemide    Tablet 40 milliGRAM(s) Oral daily  glucagon  Injectable 1 milliGRAM(s) IntraMuscular once  heparin   Injectable 5000 Unit(s) SubCutaneous every 12 hours  hydrALAZINE 25 milliGRAM(s) Oral three times a day  insulin lispro (ADMELOG) corrective regimen sliding scale   SubCutaneous every 6 hours  isosorbide   dinitrate Tablet (ISORDIL) 20 milliGRAM(s) Oral three times a day  melatonin 3 milliGRAM(s) Oral at bedtime  pancrelipase (VIOKACE) for Occluded enteral feeding tubes 1 Tablet(s) Enteral Tube once  pantoprazole   Suspension 40 milliGRAM(s) Enteral Tube daily  polyethylene glycol 3350 17 Gram(s) Oral daily  povidone iodine 10% Solution 1 Application(s) Topical daily  QUEtiapine 50 milliGRAM(s) Oral at bedtime  QUEtiapine 25 milliGRAM(s) Oral daily  sodium bicarbonate 650 milliGRAM(s) Oral three times a day  zinc oxide 20% Ointment 1 Application(s) Topical two times a day      Physical Exam  General: Patient comfortable in bed  Vital Signs Last 12 Hrs  T(F): 97.9 (09-17-24 @ 08:52), Max: 97.9 (09-17-24 @ 04:47)  HR: 77 (09-17-24 @ 08:52) (77 - 85)  BP: 117/72 (09-17-24 @ 08:52) (117/72 - 147/83)  BP(mean): 83 (09-17-24 @ 04:47) (83 - 83)  RR: 18 (09-17-24 @ 08:52) (18 - 18)  SpO2: 96% (09-17-24 @ 08:52) (96% - 99%)    CVS: S1S2   Respiratory: No wheezing, no crepitations  GI: Abdomen soft, bowel sounds positive  Musculoskeletal:  moves all extremities  : Voiding

## 2024-09-17 NOTE — PROGRESS NOTE ADULT - SUBJECTIVE AND OBJECTIVE BOX
Date of Service: 09-17-24 @ 08:02           CARDIOLOGY     PROGRESS  NOTE   ________________________________________________    CHIEF COMPLAINT:Patient is a 49y old  Male who presents with a chief complaint of SOB (17 Sep 2024 07:14)  no complain  	  REVIEW OF SYSTEMS:  CONSTITUTIONAL: No fever, weight loss, or fatigue  EYES: No eye pain, visual disturbances, or discharge  ENT:  No difficulty hearing, tinnitus, vertigo; No sinus or throat pain  NECK: No pain or stiffness  RESPIRATORY: No cough, wheezing, chills or hemoptysis; No Shortness of Breath  CARDIOVASCULAR: No chest pain, palpitations, passing out, dizziness, or leg swelling  GASTROINTESTINAL: No abdominal or epigastric pain. No nausea, vomiting, or hematemesis; No diarrhea or constipation. No melena or hematochezia.  GENITOURINARY: No dysuria, frequency, hematuria, or incontinence  NEUROLOGICAL: No headaches, memory loss, loss of strength, numbness, or tremors  SKIN: No itching, burning, rashes, or lesions   LYMPH Nodes: No enlarged glands  ENDOCRINE: No heat or cold intolerance; No hair loss  MUSCULOSKELETAL: No joint pain or swelling; No muscle, back, or extremity pain  PSYCHIATRIC: No depression, anxiety, mood swings, or difficulty sleeping  HEME/LYMPH: No easy bruising, or bleeding gums  ALLERGY AND IMMUNOLOGIC: No hives or eczema	    [ x] All others negative	  [ ] Unable to obtain    PHYSICAL EXAM:  T(C): 36.6 (09-17-24 @ 04:47), Max: 36.7 (09-16-24 @ 22:03)  HR: 85 (09-17-24 @ 04:47) (66 - 90)  BP: 147/83 (09-17-24 @ 04:47) (122/71 - 159/66)  RR: 18 (09-17-24 @ 04:47) (18 - 18)  SpO2: 99% (09-17-24 @ 04:47) (98% - 100%)  Wt(kg): --  I&O's Summary    16 Sep 2024 07:01  -  17 Sep 2024 07:00  --------------------------------------------------------  IN: 1211 mL / OUT: 0 mL / NET: 1211 mL        Appearance: Normal	  HEENT:   Normal oral mucosa, PERRL, EOMI	  Lymphatic: No lymphadenopathy  Cardiovascular: Normal S1 S2, No JVD, + murmurs, No edema  Respiratory: Lungs clear to auscultation	  Psychiatry: non verbal  Gastrointestinal:  Soft, Non-tender, + BS	  Skin: No rashes, No ecchymoses, No cyanosis	  Neurologic: can not asses  Extremities: Normal range of motion, No clubbing, cyanosis or edema  Vascular: Peripheral pulses palpable 2+ bilaterally    MEDICATIONS  (STANDING):  albuterol    90 MICROgram(s) HFA Inhaler 2 Puff(s) Inhalation every 8 hours  albuterol/ipratropium for Nebulization 3 milliLiter(s) Nebulizer every 6 hours  amLODIPine   Tablet 10 milliGRAM(s) Oral daily  atorvastatin 40 milliGRAM(s) Oral at bedtime  carvedilol 12.5 milliGRAM(s) Oral every 12 hours  dextrose 5%. 1000 milliLiter(s) (50 mL/Hr) IV Continuous <Continuous>  dextrose 5%. 1000 milliLiter(s) (100 mL/Hr) IV Continuous <Continuous>  dextrose 50% Injectable 12.5 Gram(s) IV Push once  dextrose 50% Injectable 25 Gram(s) IV Push once  dextrose 50% Injectable 25 Gram(s) IV Push once  furosemide    Tablet 40 milliGRAM(s) Oral daily  glucagon  Injectable 1 milliGRAM(s) IntraMuscular once  heparin   Injectable 5000 Unit(s) SubCutaneous every 12 hours  hydrALAZINE 25 milliGRAM(s) Oral three times a day  insulin lispro (ADMELOG) corrective regimen sliding scale   SubCutaneous every 6 hours  isosorbide   dinitrate Tablet (ISORDIL) 20 milliGRAM(s) Oral three times a day  melatonin 3 milliGRAM(s) Oral at bedtime  pancrelipase (VIOKACE) for Occluded enteral feeding tubes 1 Tablet(s) Enteral Tube once  pantoprazole   Suspension 40 milliGRAM(s) Enteral Tube daily  polyethylene glycol 3350 17 Gram(s) Oral daily  povidone iodine 10% Solution 1 Application(s) Topical daily  QUEtiapine 50 milliGRAM(s) Oral at bedtime  QUEtiapine 25 milliGRAM(s) Oral daily  sodium bicarbonate 650 milliGRAM(s) Oral three times a day  sodium zirconium cyclosilicate 5 Gram(s) Oral once  zinc oxide 20% Ointment 1 Application(s) Topical two times a day      TELEMETRY: 	    ECG:  	  RADIOLOGY:  OTHER: 	  	  LABS:	 	    CARDIAC MARKERS:                                9.3    11.68 )-----------( 335      ( 17 Sep 2024 06:41 )             28.9     09-17    137  |  103  |  62<H>  ----------------------------<  89  5.4<H>   |  22  |  2.20<H>    Ca    8.1<L>      17 Sep 2024 06:41      proBNP:   Lipid Profile:   HgA1c:   TSH: Thyroid Stimulating Hormone, Serum: 2.73 uIU/mL (09-10 @ 07:22)      Assessment and plan  ---------------------------  49-year-old male with past medical history of diabetes, hypertension, HLD. CVA with right sided hemiplegia, non verbal, dysphagia on PEG tube, recently treated pneumonia with meropenem (8/27-9/2) brought in by EMS from [facility] for difficulty breathing and hypoxia 85% RA, difficulty breathing per EMS and nursing home documentation.  Patient unable to provide additional history.     pt with sig medical and cardiac hx with increasing sob, COVID +, recently treated with pneumoniae  check d dimer if elevated , vq scan to r/o PE  echo  ID eval RDV increase creatinine renal eval  pt with hx of PAF ?AC will discus with Dr Prasad  CAD/ ASHD continue cardiac meds will adjust  increase bp will adjust meds, may increase hydralazine dose  ID noted   CKD continue to observe , on sodium bicarb  renal ultrasound  dvt prophylaxis, check d dimer noted  awaiting echo  chest ct with moderate R  pleural effusion, awaiting echo, may need therapeutic and diagnostic thoracentesis  peg feeding as per medicine  may consider to dc ivf, free water via the peg  bp is better controlled with increase hydralazine dose  may add diuretics will discuss with renal  check lytes  still with increase bp, dc labetalol start on coreg 12.5 mg bid  bp is better controlled with Coreg, echo noted with EF 40%, not a candidate for aggressive measures  pleural effusion ?chf , Renal comments about adding diuretics, increase lasix dose  increase hydralazine or  coreg for better bp control, bp is well controlled today  renal function needs to be followed closely. continue  diuretics  fu the chest x ray in few weeks  fu K level, ?add lokelma as poer renal

## 2024-09-17 NOTE — PROGRESS NOTE ADULT - SUBJECTIVE AND OBJECTIVE BOX
NEPHROLOGY-[facility] [phone]        Patient seen and examined in bed.  He was the same         MEDICATIONS  (STANDING):  albuterol    90 MICROgram(s) HFA Inhaler 2 Puff(s) Inhalation every 8 hours  albuterol/ipratropium for Nebulization 3 milliLiter(s) Nebulizer every 6 hours  amLODIPine   Tablet 10 milliGRAM(s) Oral daily  atorvastatin 40 milliGRAM(s) Oral at bedtime  carvedilol 12.5 milliGRAM(s) Oral every 12 hours  dextrose 5%. 1000 milliLiter(s) (100 mL/Hr) IV Continuous <Continuous>  dextrose 5%. 1000 milliLiter(s) (50 mL/Hr) IV Continuous <Continuous>  dextrose 50% Injectable 12.5 Gram(s) IV Push once  dextrose 50% Injectable 25 Gram(s) IV Push once  dextrose 50% Injectable 25 Gram(s) IV Push once  furosemide    Tablet 40 milliGRAM(s) Oral daily  glucagon  Injectable 1 milliGRAM(s) IntraMuscular once  heparin   Injectable 5000 Unit(s) SubCutaneous every 12 hours  hydrALAZINE 25 milliGRAM(s) Oral three times a day  insulin lispro (ADMELOG) corrective regimen sliding scale   SubCutaneous every 6 hours  isosorbide   dinitrate Tablet (ISORDIL) 20 milliGRAM(s) Oral three times a day  melatonin 3 milliGRAM(s) Oral at bedtime  pancrelipase (VIOKACE) for Occluded enteral feeding tubes 1 Tablet(s) Enteral Tube once  pantoprazole   Suspension 40 milliGRAM(s) Enteral Tube daily  polyethylene glycol 3350 17 Gram(s) Oral daily  povidone iodine 10% Solution 1 Application(s) Topical daily  QUEtiapine 50 milliGRAM(s) Oral at bedtime  QUEtiapine 25 milliGRAM(s) Oral daily  sodium bicarbonate 650 milliGRAM(s) Oral three times a day  sodium zirconium cyclosilicate 5 Gram(s) Oral once  zinc oxide 20% Ointment 1 Application(s) Topical two times a day      VITAL:  T(C): , Max: 36.7 (09-16-24 @ 22:03)  T(F): , Max: 98.1 (09-16-24 @ 22:03)  HR: 77 (09-17-24 @ 08:52)  BP: 117/72 (09-17-24 @ 08:52)  BP(mean): 83 (09-17-24 @ 04:47)  RR: 18 (09-17-24 @ 08:52)  SpO2: 96% (09-17-24 @ 08:52)  Wt(kg): --    I and O's:    09-16 @ 07:01  -  09-17 @ 07:00  --------------------------------------------------------  IN: 1211 mL / OUT: 0 mL / NET: 1211 mL          PHYSICAL EXAM:    Constitutional: NAD  Neck:  No JVD  Respiratory: CTAB/L  Cardiovascular: S1 and S2  Gastrointestinal: BS+, soft, NT/ND  Extremities: No peripheral edema  Neurological: right sided weakness   Psychiatric: Normal mood, normal affect  : No [place]  Skin: No rashes  Access: Not applicable    LABS:                        9.3    11.68 )-----------( 335      ( 17 Sep 2024 06:41 )             28.9     09-17    137  |  103  |  62[H]  ----------------------------<  89  5.4[H]   |  22  |  2.20[H]    Ca    8.1[L]      17 Sep 2024 06:41            Urine Studies:  Urinalysis Basic - ( 17 Sep 2024 06:41 )    Color: x / Appearance: x / SG: x / pH: x  Gluc: 89 mg/dL / Ketone: x  / Bili: x / Urobili: x   Blood: x / Protein: x / Nitrite: x   Leuk Esterase: x / RBC: x / WBC x   Sq Epi: x / Non Sq Epi: x / Bacteria: x            RADIOLOGY & ADDITIONAL STUDIES:

## 2024-09-17 NOTE — PROGRESS NOTE ADULT - SUBJECTIVE AND OBJECTIVE BOX
date of service: 09-17-24 @ 07:15  [facility]  REVIEW OF SYSTEMS:  CONSTITUTIONAL: No fever,  no  weight loss  ENT:  No  tinnitus,   no   vertigo  NECK: No pain or stiffness  RESPIRATORY: No cough, wheezing, chills or hemoptysis;    No Shortness of Breath  CARDIOVASCULAR: No chest pain, palpitations, dizziness  GASTROINTESTINAL: No abdominal or epigastric pain. No nausea, vomiting, or hematemesis; No diarrhea  No melena or hematochezia.  GENITOURINARY: No dysuria, frequency, hematuria, or incontinence  NEUROLOGICAL: No headaches  SKIN: No itching,  no   rash  LYMPH Nodes: No enlarged glands  ENDOCRINE: No heat or cold intolerance  MUSCULOSKELETAL: No joint pain or swelling  PSYCHIATRIC: No depression, anxiety  HEME/LYMPH: No easy bruising, or bleeding gums  ALLERGY AND IMMUNOLOGIC: No hives or eczema	    MEDICATIONS  (STANDING):  albuterol    90 MICROgram(s) HFA Inhaler 2 Puff(s) Inhalation every 8 hours  albuterol/ipratropium for Nebulization 3 milliLiter(s) Nebulizer every 6 hours  amLODIPine   Tablet 10 milliGRAM(s) Oral daily  atorvastatin 40 milliGRAM(s) Oral at bedtime  carvedilol 12.5 milliGRAM(s) Oral every 12 hours  dextrose 5%. 1000 milliLiter(s) (50 mL/Hr) IV Continuous <Continuous>  dextrose 5%. 1000 milliLiter(s) (100 mL/Hr) IV Continuous <Continuous>  dextrose 50% Injectable 12.5 Gram(s) IV Push once  dextrose 50% Injectable 25 Gram(s) IV Push once  dextrose 50% Injectable 25 Gram(s) IV Push once  furosemide    Tablet 40 milliGRAM(s) Oral daily  glucagon  Injectable 1 milliGRAM(s) IntraMuscular once  heparin   Injectable 5000 Unit(s) SubCutaneous every 12 hours  hydrALAZINE 25 milliGRAM(s) Oral three times a day  insulin lispro (ADMELOG) corrective regimen sliding scale   SubCutaneous every 6 hours  isosorbide   dinitrate Tablet (ISORDIL) 20 milliGRAM(s) Oral three times a day  melatonin 3 milliGRAM(s) Oral at bedtime  pancrelipase (VIOKACE) for Occluded enteral feeding tubes 1 Tablet(s) Enteral Tube once  pantoprazole   Suspension 40 milliGRAM(s) Enteral Tube daily  polyethylene glycol 3350 17 Gram(s) Oral daily  povidone iodine 10% Solution 1 Application(s) Topical daily  QUEtiapine 50 milliGRAM(s) Oral at bedtime  QUEtiapine 25 milliGRAM(s) Oral daily  sodium bicarbonate 650 milliGRAM(s) Oral three times a day  zinc oxide 20% Ointment 1 Application(s) Topical two times a day    MEDICATIONS  (PRN):  acetaminophen   Oral Liquid .. 650 milliGRAM(s) Oral every 6 hours PRN Temp greater or equal to 38C (100.4F), Moderate Pain (4 - 6)  dextrose Oral Gel 15 Gram(s) Oral once PRN Blood Glucose LESS THAN 70 milliGRAM(s)/deciliter  haloperidol    Injectable 2 milliGRAM(s) IntraMuscular once PRN Agitation      Vital Signs Last 24 Hrs  T(C): 36.6 (17 Sep 2024 04:47), Max: 36.7 (16 Sep 2024 22:03)  T(F): 97.9 (17 Sep 2024 04:47), Max: 98.1 (16 Sep 2024 22:03)  HR: 85 (17 Sep 2024 04:47) (66 - 90)  BP: 147/83 (17 Sep 2024 04:47) (122/71 - 159/66)  BP(mean): 83 (17 Sep 2024 04:47) (83 - 83)  RR: 18 (17 Sep 2024 04:47) (18 - 18)  SpO2: 99% (17 Sep 2024 04:47) (98% - 100%)    Parameters below as of 17 Sep 2024 04:47  Patient On (Oxygen Delivery Method): room air      CAPILLARY BLOOD GLUCOSE      POCT Blood Glucose.: 93 mg/dL (17 Sep 2024 05:52)  POCT Blood Glucose.: 109 mg/dL (16 Sep 2024 23:56)  POCT Blood Glucose.: 94 mg/dL (16 Sep 2024 16:59)  POCT Blood Glucose.: 109 mg/dL (16 Sep 2024 12:07)  POCT Blood Glucose.: 106 mg/dL (16 Sep 2024 10:29)    I&O's Summary    16 Sep 2024 07:01  -  17 Sep 2024 07:00  --------------------------------------------------------  IN: 1211 mL / OUT: 0 mL / NET: 1211 mL          Appearance: Normal	  HEENT:   Normal oral mucosa, PERRL, EOMI	  Lymphatic: No lymphadenopathy  Cardiovascular: Normal S1 S2, No JVD  Respiratory: Lungs clear to auscultation	  Gastrointestinal:  Soft, Non-tender, + BS	  Skin: No rash, No ecchymoses	  Extremities:     LABS:                        9.3    11.68 )-----------( 335      ( 17 Sep 2024 06:41 )             28.9     09-16    138  |  106  |  58<H>  ----------------------------<  102<H>  5.0   |  22  |  2.14<H>    Ca    8.1<L>      16 Sep 2024 11:11            Urinalysis Basic - ( 16 Sep 2024 11:11 )    Color: x / Appearance: x / SG: x / pH: x  Gluc: 102 mg/dL / Ketone: x  / Bili: x / Urobili: x   Blood: x / Protein: x / Nitrite: x   Leuk Esterase: x / RBC: x / WBC x   Sq Epi: x / Non Sq Epi: x / Bacteria: x              Thyroid Stimulating Hormone, Serum: 2.73 uIU/mL (09-10 @ 07:22)          Consultant(s) Notes Reviewed:      Care Discussed with Consultants/Other Providers:

## 2024-09-17 NOTE — PROGRESS NOTE ADULT - ASSESSMENT
49-year-old male     h/o  HTN/ HLD. DM ,   CVA with right sided hemiplegia, non verbal, dysphagia ,has  PEG tube, recently treated pneumonia with meropenem (8/27-9/2) brought in by EMS from [facility] for difficulty breathing and hypoxia 85%   per   nursing home , pt has   cpvid   QUITA      sob/  hypoxia,  from covid/ pna/  and  chf/ acute  diastolic     prior  h/o  pna's/  suspect  pt has   c/c  aspiration// pna/  cxr . infection  vx  fluid  overload       on   remdidvir/  decadron/   was  on  iv zosyn    per  id  dr raya bedyoa,  no  sepsis,  and  no  need  for  ab     gi  eval.    QUITA.  f/p  by enrrique;l  d r ali      CVA,  non verbal, /  r hemiplegia,  bed  bound nal  quadriplegia         follow  fs.  endo  d r millan     quita, resolving     c/c  aspiration. despite  peg      ct ca/p.  mild  goo,  r pl  effusion,  on iv lasix.  fluid  overload. ?  acute   systolic   chf/   seen by  pulm/     echo  ,  ef  40,      dermatitis/  moisture, on back. on zinx c oxide/  fungal  discolore d toes,  no rx     meds  adjusted  by card ,  , on coreg. / pt not  an ideal  candidate  for  intervention    lantus  stopped.  follwo  fs    dvt  ppx     per  floor,  n/ home  will  only  accept  at a  certain  date,  due  to covid    d/c  plans  in progress/ has  been  cleraed/  still  awiating   d/c        pt  is  prior dbr/dni/  epr  n/home paper

## 2024-09-17 NOTE — PROGRESS NOTE ADULT - ASSESSMENT
49-year-old male h/o  HTN/ HLD. DM ,   CVA with right sided hemiplegia, non verbal, dysphagia ,has  PEG tube, recently treated pneumonia brought in by EMS from [facility] for difficulty breathing and hypoxia   per   nursing home , pt has   covid   QUITA    Assessment  Hyperglycemia: 49y Male with hyperglycemias, being started on tube feeds via PEG tube, A1c stable, not on DM meds, started on sliding scale now.  Glucose trending up on bolus tube feedings, basal insulin started. sugars trending down   COVID+: on medications, on isolation , monitored.  HTN: on antihypertensive medications, monitored, asymptomatic.    Discussed plan and management wit Dr Mau León MD  Cell: [phone]  Office: [phone]

## 2024-09-18 ENCOUNTER — EMERGENCY (EMERGENCY)
Facility: HOSPITAL | Age: 49
LOS: 1 days | Discharge: HOME | End: 2024-09-18
Attending: EMERGENCY MEDICINE
Payer: MEDICAID

## 2024-09-18 VITALS
DIASTOLIC BLOOD PRESSURE: 85 MMHG | SYSTOLIC BLOOD PRESSURE: 149 MMHG | WEIGHT: 130.07 LBS | HEIGHT: 67 IN | HEART RATE: 90 BPM | RESPIRATION RATE: 16 BRPM | OXYGEN SATURATION: 97 % | TEMPERATURE: 99 F

## 2024-09-18 VITALS
SYSTOLIC BLOOD PRESSURE: 142 MMHG | DIASTOLIC BLOOD PRESSURE: 78 MMHG | HEART RATE: 89 BPM | RESPIRATION RATE: 18 BRPM | OXYGEN SATURATION: 97 % | TEMPERATURE: 98 F

## 2024-09-18 VITALS
RESPIRATION RATE: 18 BRPM | TEMPERATURE: 98 F | SYSTOLIC BLOOD PRESSURE: 158 MMHG | HEART RATE: 87 BPM | OXYGEN SATURATION: 98 % | DIASTOLIC BLOOD PRESSURE: 87 MMHG

## 2024-09-18 DIAGNOSIS — Z93.1 GASTROSTOMY STATUS: Chronic | ICD-10-CM

## 2024-09-18 PROBLEM — I10 ESSENTIAL (PRIMARY) HYPERTENSION: Chronic | Status: ACTIVE | Noted: 2024-09-08

## 2024-09-18 PROBLEM — F41.9 ANXIETY DISORDER, UNSPECIFIED: Chronic | Status: ACTIVE | Noted: 2024-09-08

## 2024-09-18 PROBLEM — E78.5 HYPERLIPIDEMIA, UNSPECIFIED: Chronic | Status: ACTIVE | Noted: 2024-09-08

## 2024-09-18 PROBLEM — D50.9 IRON DEFICIENCY ANEMIA, UNSPECIFIED: Chronic | Status: ACTIVE | Noted: 2024-09-08

## 2024-09-18 PROBLEM — Z87.09 PERSONAL HISTORY OF OTHER DISEASES OF THE RESPIRATORY SYSTEM: Chronic | Status: ACTIVE | Noted: 2024-09-08

## 2024-09-18 PROBLEM — I21.4 NON-ST ELEVATION (NSTEMI) MYOCARDIAL INFARCTION: Chronic | Status: ACTIVE | Noted: 2024-09-08

## 2024-09-18 PROBLEM — I48.0 PAROXYSMAL ATRIAL FIBRILLATION: Chronic | Status: ACTIVE | Noted: 2024-09-08

## 2024-09-18 PROBLEM — R47.01 APHASIA: Chronic | Status: ACTIVE | Noted: 2024-09-08

## 2024-09-18 PROBLEM — N18.30 CHRONIC KIDNEY DISEASE, STAGE 3 UNSPECIFIED: Chronic | Status: ACTIVE | Noted: 2024-09-08

## 2024-09-18 PROBLEM — I63.9 CEREBRAL INFARCTION, UNSPECIFIED: Chronic | Status: ACTIVE | Noted: 2024-09-08

## 2024-09-18 PROBLEM — E11.9 TYPE 2 DIABETES MELLITUS WITHOUT COMPLICATIONS: Chronic | Status: ACTIVE | Noted: 2024-09-08

## 2024-09-18 LAB
ANION GAP SERPL CALC-SCNC: 16 MMOL/L — SIGNIFICANT CHANGE UP (ref 5–17)
BUN SERPL-MCNC: 63 MG/DL — HIGH (ref 7–23)
CALCIUM SERPL-MCNC: 8.4 MG/DL — SIGNIFICANT CHANGE UP (ref 8.4–10.5)
CHLORIDE SERPL-SCNC: 103 MMOL/L — SIGNIFICANT CHANGE UP (ref 96–108)
CO2 SERPL-SCNC: 21 MMOL/L — LOW (ref 22–31)
CREAT SERPL-MCNC: 2.33 MG/DL — HIGH (ref 0.5–1.3)
EGFR: 34 ML/MIN/1.73M2 — LOW
GLUCOSE BLDC GLUCOMTR-MCNC: 110 MG/DL — HIGH (ref 70–99)
GLUCOSE BLDC GLUCOMTR-MCNC: 164 MG/DL — HIGH (ref 70–99)
GLUCOSE BLDC GLUCOMTR-MCNC: 181 MG/DL — HIGH (ref 70–99)
GLUCOSE SERPL-MCNC: 146 MG/DL — HIGH (ref 70–99)
POTASSIUM SERPL-MCNC: 5.1 MMOL/L — SIGNIFICANT CHANGE UP (ref 3.5–5.3)
POTASSIUM SERPL-SCNC: 5.1 MMOL/L — SIGNIFICANT CHANGE UP (ref 3.5–5.3)
SODIUM SERPL-SCNC: 140 MMOL/L — SIGNIFICANT CHANGE UP (ref 135–145)

## 2024-09-18 PROCEDURE — 70450 CT HEAD/BRAIN W/O DYE: CPT | Mod: 26,MC

## 2024-09-18 PROCEDURE — 96374 THER/PROPH/DIAG INJ IV PUSH: CPT

## 2024-09-18 PROCEDURE — 72170 X-RAY EXAM OF PELVIS: CPT

## 2024-09-18 PROCEDURE — 72170 X-RAY EXAM OF PELVIS: CPT | Mod: 26

## 2024-09-18 PROCEDURE — 83036 HEMOGLOBIN GLYCOSYLATED A1C: CPT

## 2024-09-18 PROCEDURE — 84156 ASSAY OF PROTEIN URINE: CPT

## 2024-09-18 PROCEDURE — 84484 ASSAY OF TROPONIN QUANT: CPT

## 2024-09-18 PROCEDURE — 84443 ASSAY THYROID STIM HORMONE: CPT

## 2024-09-18 PROCEDURE — 80076 HEPATIC FUNCTION PANEL: CPT

## 2024-09-18 PROCEDURE — 94640 AIRWAY INHALATION TREATMENT: CPT

## 2024-09-18 PROCEDURE — C8924: CPT

## 2024-09-18 PROCEDURE — 85025 COMPLETE CBC W/AUTO DIFF WBC: CPT

## 2024-09-18 PROCEDURE — 85027 COMPLETE CBC AUTOMATED: CPT

## 2024-09-18 PROCEDURE — 73630 X-RAY EXAM OF FOOT: CPT | Mod: 26,LT

## 2024-09-18 PROCEDURE — 73080 X-RAY EXAM OF ELBOW: CPT | Mod: 26,LT

## 2024-09-18 PROCEDURE — 99284 EMERGENCY DEPT VISIT MOD MDM: CPT

## 2024-09-18 PROCEDURE — 84132 ASSAY OF SERUM POTASSIUM: CPT

## 2024-09-18 PROCEDURE — 71045 X-RAY EXAM CHEST 1 VIEW: CPT

## 2024-09-18 PROCEDURE — 74176 CT ABD & PELVIS W/O CONTRAST: CPT | Mod: MC

## 2024-09-18 PROCEDURE — 83605 ASSAY OF LACTIC ACID: CPT

## 2024-09-18 PROCEDURE — 85730 THROMBOPLASTIN TIME PARTIAL: CPT

## 2024-09-18 PROCEDURE — 99284 EMERGENCY DEPT VISIT MOD MDM: CPT | Mod: 25

## 2024-09-18 PROCEDURE — 71045 X-RAY EXAM CHEST 1 VIEW: CPT | Mod: 26

## 2024-09-18 PROCEDURE — 82570 ASSAY OF URINE CREATININE: CPT

## 2024-09-18 PROCEDURE — 87086 URINE CULTURE/COLONY COUNT: CPT

## 2024-09-18 PROCEDURE — 83970 ASSAY OF PARATHORMONE: CPT

## 2024-09-18 PROCEDURE — 36415 COLL VENOUS BLD VENIPUNCTURE: CPT

## 2024-09-18 PROCEDURE — 82962 GLUCOSE BLOOD TEST: CPT

## 2024-09-18 PROCEDURE — 93325 DOPPLER ECHO COLOR FLOW MAPG: CPT

## 2024-09-18 PROCEDURE — 82435 ASSAY OF BLOOD CHLORIDE: CPT

## 2024-09-18 PROCEDURE — 82330 ASSAY OF CALCIUM: CPT

## 2024-09-18 PROCEDURE — 83735 ASSAY OF MAGNESIUM: CPT

## 2024-09-18 PROCEDURE — 80053 COMPREHEN METABOLIC PANEL: CPT

## 2024-09-18 PROCEDURE — 82248 BILIRUBIN DIRECT: CPT

## 2024-09-18 PROCEDURE — 70450 CT HEAD/BRAIN W/O DYE: CPT | Mod: MC

## 2024-09-18 PROCEDURE — 87040 BLOOD CULTURE FOR BACTERIA: CPT

## 2024-09-18 PROCEDURE — 82272 OCCULT BLD FECES 1-3 TESTS: CPT

## 2024-09-18 PROCEDURE — 72125 CT NECK SPINE W/O DYE: CPT | Mod: 26,MC

## 2024-09-18 PROCEDURE — 85018 HEMOGLOBIN: CPT

## 2024-09-18 PROCEDURE — 84295 ASSAY OF SERUM SODIUM: CPT

## 2024-09-18 PROCEDURE — 84439 ASSAY OF FREE THYROXINE: CPT

## 2024-09-18 PROCEDURE — 84300 ASSAY OF URINE SODIUM: CPT

## 2024-09-18 PROCEDURE — 72125 CT NECK SPINE W/O DYE: CPT | Mod: MC

## 2024-09-18 PROCEDURE — 82947 ASSAY GLUCOSE BLOOD QUANT: CPT

## 2024-09-18 PROCEDURE — 83880 ASSAY OF NATRIURETIC PEPTIDE: CPT

## 2024-09-18 PROCEDURE — 73080 X-RAY EXAM OF ELBOW: CPT

## 2024-09-18 PROCEDURE — 73630 X-RAY EXAM OF FOOT: CPT

## 2024-09-18 PROCEDURE — 85610 PROTHROMBIN TIME: CPT

## 2024-09-18 PROCEDURE — 81001 URINALYSIS AUTO W/SCOPE: CPT

## 2024-09-18 PROCEDURE — 82310 ASSAY OF CALCIUM: CPT

## 2024-09-18 PROCEDURE — 85379 FIBRIN DEGRADATION QUANT: CPT

## 2024-09-18 PROCEDURE — 82565 ASSAY OF CREATININE: CPT

## 2024-09-18 PROCEDURE — 85014 HEMATOCRIT: CPT

## 2024-09-18 PROCEDURE — 71250 CT THORAX DX C-: CPT | Mod: MC

## 2024-09-18 PROCEDURE — 84550 ASSAY OF BLOOD/URIC ACID: CPT

## 2024-09-18 PROCEDURE — 80048 BASIC METABOLIC PNL TOTAL CA: CPT

## 2024-09-18 PROCEDURE — 84100 ASSAY OF PHOSPHORUS: CPT

## 2024-09-18 PROCEDURE — 99291 CRITICAL CARE FIRST HOUR: CPT

## 2024-09-18 PROCEDURE — 74018 RADEX ABDOMEN 1 VIEW: CPT

## 2024-09-18 PROCEDURE — 87637 SARSCOV2&INF A&B&RSV AMP PRB: CPT

## 2024-09-18 PROCEDURE — 82803 BLOOD GASES ANY COMBINATION: CPT

## 2024-09-18 RX ORDER — HYDRALAZINE HCL 50 MG
1 TABLET ORAL
Qty: 0 | Refills: 0 | DISCHARGE
Start: 2024-09-18

## 2024-09-18 RX ORDER — SODIUM BICARBONATE 84 MG/ML
1300 INJECTION, SOLUTION INTRAVENOUS
Refills: 0 | DISCHARGE

## 2024-09-18 RX ORDER — SODIUM BICARBONATE 84 MG/ML
1 INJECTION, SOLUTION INTRAVENOUS
Qty: 0 | Refills: 0 | DISCHARGE
Start: 2024-09-18

## 2024-09-18 RX ORDER — FUROSEMIDE 40 MG
1 TABLET ORAL
Qty: 0 | Refills: 0 | DISCHARGE
Start: 2024-09-18

## 2024-09-18 RX ORDER — ZINC OXIDE 100 MG/G
0 OINTMENT TOPICAL
Refills: 0 | DISCHARGE

## 2024-09-18 RX ORDER — AMLODIPINE BESYLATE 10 MG/1
1 TABLET ORAL
Qty: 0 | Refills: 0 | DISCHARGE
Start: 2024-09-18

## 2024-09-18 RX ORDER — HYDRALAZINE HCL 50 MG
1 TABLET ORAL
Refills: 0 | DISCHARGE

## 2024-09-18 RX ORDER — CARVEDILOL 6.25 MG/1
1 TABLET ORAL
Qty: 0 | Refills: 0 | DISCHARGE
Start: 2024-09-18

## 2024-09-18 RX ORDER — PANTOPRAZOLE SODIUM 40 MG
1 TABLET, DELAYED RELEASE (ENTERIC COATED) ORAL
Qty: 0 | Refills: 0 | DISCHARGE
Start: 2024-09-18

## 2024-09-18 RX ORDER — FUROSEMIDE 40 MG
1 TABLET ORAL
Refills: 0 | DISCHARGE

## 2024-09-18 RX ORDER — POLYETHYLENE GLYCOL 3350 17 G/17G
17 POWDER, FOR SOLUTION ORAL
Qty: 0 | Refills: 0 | DISCHARGE
Start: 2024-09-18

## 2024-09-18 RX ORDER — ASPIRIN 81 MG
1 TABLET, DELAYED RELEASE (ENTERIC COATED) ORAL
Refills: 0 | DISCHARGE

## 2024-09-18 RX ORDER — ZINC OXIDE 100 MG/G
1 OINTMENT TOPICAL
Qty: 0 | Refills: 0 | DISCHARGE
Start: 2024-09-18

## 2024-09-18 RX ORDER — PANCRELIPASE 16000; 60500; 57500 [USP'U]/1; [USP'U]/1; [USP'U]/1
0 CAPSULE, DELAYED RELEASE ORAL
Qty: 0 | Refills: 0 | DISCHARGE
Start: 2024-09-18

## 2024-09-18 RX ORDER — SENNA 187 MG
2 TABLET ORAL
Refills: 0 | DISCHARGE

## 2024-09-18 RX ORDER — AMLODIPINE BESYLATE 10 MG/1
1 TABLET ORAL
Refills: 0 | DISCHARGE

## 2024-09-18 RX ADMIN — Medication 40 MILLIGRAM(S): at 12:25

## 2024-09-18 RX ADMIN — Medication 5000 UNIT(S): at 05:18

## 2024-09-18 RX ADMIN — Medication 40 MILLIGRAM(S): at 05:19

## 2024-09-18 RX ADMIN — Medication 1 APPLICATION(S): at 12:31

## 2024-09-18 RX ADMIN — SODIUM BICARBONATE 650 MILLIGRAM(S): 84 INJECTION, SOLUTION INTRAVENOUS at 05:18

## 2024-09-18 RX ADMIN — Medication 25 MILLIGRAM(S): at 12:19

## 2024-09-18 RX ADMIN — AMLODIPINE BESYLATE 10 MILLIGRAM(S): 10 TABLET ORAL at 05:19

## 2024-09-18 RX ADMIN — ZINC OXIDE 1 APPLICATION(S): 100 OINTMENT TOPICAL at 12:31

## 2024-09-18 RX ADMIN — IPRATROPIUM BROMIDE AND ALBUTEROL SULFATE 3 MILLILITER(S): .5; 3 SOLUTION RESPIRATORY (INHALATION) at 05:17

## 2024-09-18 RX ADMIN — Medication 1: at 05:24

## 2024-09-18 RX ADMIN — CARVEDILOL 12.5 MILLIGRAM(S): 6.25 TABLET ORAL at 05:19

## 2024-09-18 RX ADMIN — Medication 1: at 12:20

## 2024-09-18 RX ADMIN — Medication 20 MILLIGRAM(S): at 05:19

## 2024-09-18 RX ADMIN — Medication 25 MILLIGRAM(S): at 05:18

## 2024-09-18 RX ADMIN — Medication 2 PUFF(S): at 05:17

## 2024-09-18 NOTE — PROGRESS NOTE ADULT - SUBJECTIVE AND OBJECTIVE BOX
Chief complaint  Patient is a 49y old  Male who presents with a chief complaint of SOB (18 Sep 2024 09:35)         Labs and Fingersticks  CAPILLARY BLOOD GLUCOSE      POCT Blood Glucose.: 181 mg/dL (18 Sep 2024 12:00)  POCT Blood Glucose.: 164 mg/dL (18 Sep 2024 05:21)  POCT Blood Glucose.: 110 mg/dL (17 Sep 2024 23:59)  POCT Blood Glucose.: 112 mg/dL (17 Sep 2024 16:56)      Anion Gap: 16 (09-18 @ 07:52)  Anion Gap: 12 (09-17 @ 06:41)      Calcium: 8.4 (09-18 @ 07:52)  Calcium: 8.1 *L* (09-17 @ 06:41)          09-18    140  |  103  |  63[H]  ----------------------------<  146[H]  5.1   |  21[L]  |  2.33[H]    Ca    8.4      18 Sep 2024 07:52                          9.3    11.68 )-----------( 335      ( 17 Sep 2024 06:41 )             28.9     Medications  MEDICATIONS  (STANDING):  albuterol    90 MICROgram(s) HFA Inhaler 2 Puff(s) Inhalation every 8 hours  amLODIPine   Tablet 10 milliGRAM(s) Oral daily  atorvastatin 40 milliGRAM(s) Oral at bedtime  carvedilol 12.5 milliGRAM(s) Oral every 12 hours  dextrose 5%. 1000 milliLiter(s) (100 mL/Hr) IV Continuous <Continuous>  dextrose 5%. 1000 milliLiter(s) (50 mL/Hr) IV Continuous <Continuous>  dextrose 50% Injectable 25 Gram(s) IV Push once  dextrose 50% Injectable 25 Gram(s) IV Push once  dextrose 50% Injectable 12.5 Gram(s) IV Push once  furosemide    Tablet 40 milliGRAM(s) Oral daily  glucagon  Injectable 1 milliGRAM(s) IntraMuscular once  heparin   Injectable 5000 Unit(s) SubCutaneous every 12 hours  hydrALAZINE 25 milliGRAM(s) Oral three times a day  insulin lispro (ADMELOG) corrective regimen sliding scale   SubCutaneous every 6 hours  isosorbide   dinitrate Tablet (ISORDIL) 20 milliGRAM(s) Oral three times a day  melatonin 3 milliGRAM(s) Oral at bedtime  pancrelipase (VIOKACE) for Occluded enteral feeding tubes 1 Tablet(s) Enteral Tube once  pantoprazole   Suspension 40 milliGRAM(s) Enteral Tube daily  polyethylene glycol 3350 17 Gram(s) Oral daily  povidone iodine 10% Solution 1 Application(s) Topical daily  QUEtiapine 25 milliGRAM(s) Oral daily  QUEtiapine 50 milliGRAM(s) Oral at bedtime  sodium bicarbonate 650 milliGRAM(s) Oral three times a day  zinc oxide 20% Ointment 1 Application(s) Topical two times a day      Physical Exam  General: Patient comfortable in bed   Vital Signs Last 12 Hrs  T(F): 98.4 (09-18-24 @ 12:04), Max: 98.4 (09-18-24 @ 12:04)  HR: 89 (09-18-24 @ 12:04) (84 - 89)  BP: 142/78 (09-18-24 @ 12:04) (122/76 - 142/78)  BP(mean): --  RR: 18 (09-18-24 @ 12:04) (18 - 18)  SpO2: 97% (09-18-24 @ 12:04) (96% - 98%)    CVS: S1S2   Respiratory: No wheezing, no crepitations  GI: Abdomen soft, bowel sounds positive  Musculoskeletal:  moves all extremities  : Voiding        Chief complaint  Patient is a 49y old  Male who presents with a chief complaint of SOB (18 Sep 2024 09:35)         Labs and Fingersticks  CAPILLARY BLOOD GLUCOSE      POCT Blood Glucose.: 181 mg/dL (18 Sep 2024 12:00)  POCT Blood Glucose.: 164 mg/dL (18 Sep 2024 05:21)  POCT Blood Glucose.: 110 mg/dL (17 Sep 2024 23:59)  POCT Blood Glucose.: 112 mg/dL (17 Sep 2024 16:56)      Anion Gap: 16 (09-18 @ 07:52)  Anion Gap: 12 (09-17 @ 06:41)      Calcium: 8.4 (09-18 @ 07:52)  Calcium: 8.1 *L* (09-17 @ 06:41)          09-18    140  |  103  |  63[H]  ----------------------------<  146[H]  5.1   |  21[L]  |  2.33[H]    Ca    8.4      18 Sep 2024 07:52                          9.3    11.68 )-----------( 335      ( 17 Sep 2024 06:41 )             28.9     Medications  MEDICATIONS  (STANDING):  albuterol    90 MICROgram(s) HFA Inhaler 2 Puff(s) Inhalation every 8 hours  amLODIPine   Tablet 10 milliGRAM(s) Oral daily  atorvastatin 40 milliGRAM(s) Oral at bedtime  carvedilol 12.5 milliGRAM(s) Oral every 12 hours  dextrose 5%. 1000 milliLiter(s) (100 mL/Hr) IV Continuous <Continuous>  dextrose 5%. 1000 milliLiter(s) (50 mL/Hr) IV Continuous <Continuous>  dextrose 50% Injectable 25 Gram(s) IV Push once  dextrose 50% Injectable 25 Gram(s) IV Push once  dextrose 50% Injectable 12.5 Gram(s) IV Push once  furosemide    Tablet 40 milliGRAM(s) Oral daily  glucagon  Injectable 1 milliGRAM(s) IntraMuscular once  heparin   Injectable 5000 Unit(s) SubCutaneous every 12 hours  hydrALAZINE 25 milliGRAM(s) Oral three times a day  insulin lispro (ADMELOG) corrective regimen sliding scale   SubCutaneous every 6 hours  isosorbide   dinitrate Tablet (ISORDIL) 20 milliGRAM(s) Oral three times a day  melatonin 3 milliGRAM(s) Oral at bedtime  pancrelipase (VIOKACE) for Occluded enteral feeding tubes 1 Tablet(s) Enteral Tube once  pantoprazole   Suspension 40 milliGRAM(s) Enteral Tube daily  polyethylene glycol 3350 17 Gram(s) Oral daily  povidone iodine 10% Solution 1 Application(s) Topical daily  QUEtiapine 25 milliGRAM(s) Oral daily  QUEtiapine 50 milliGRAM(s) Oral at bedtime  sodium bicarbonate 650 milliGRAM(s) Oral three times a day  zinc oxide 20% Ointment 1 Application(s) Topical two times a day      Physical Exam  General: Patient comfortable in bed   Vital Signs Last 12 Hrs  T(F): 98.4 (09-18-24 @ 12:04), Max: 98.4 (09-18-24 @ 12:04)  HR: 89 (09-18-24 @ 12:04) (84 - 89)  BP: 142/78 (09-18-24 @ 12:04) (122/76 - 142/78)  BP(mean): --  RR: 18 (09-18-24 @ 12:04) (18 - 18)  SpO2: 97% (09-18-24 @ 12:04) (96% - 98%)    CVS: S1S2   Respiratory: No wheezing, no crepitations  GI: Abdomen soft, bowel sounds positive  Musculoskeletal:  moves all extremities  : Voiding

## 2024-09-18 NOTE — PROGRESS NOTE ADULT - SUBJECTIVE AND OBJECTIVE BOX
date of service: 09-18-24 @ 06:58  [facility]  REVIEW OF SYSTEMS:  CONSTITUTIONAL: No fever,  no  weight loss  ENT:  No  tinnitus,   no   vertigo  NECK: No pain or stiffness  RESPIRATORY: No cough, wheezing, chills or hemoptysis;    No Shortness of Breath  CARDIOVASCULAR: No chest pain, palpitations, dizziness  GASTROINTESTINAL: No abdominal or epigastric pain. No nausea, vomiting, or hematemesis; No diarrhea  No melena or hematochezia.  GENITOURINARY: No dysuria, frequency, hematuria, or incontinence  NEUROLOGICAL: No headaches  SKIN: No itching,  no   rash  LYMPH Nodes: No enlarged glands  ENDOCRINE: No heat or cold intolerance  MUSCULOSKELETAL: No joint pain or swelling  PSYCHIATRIC: No depression, anxiety  HEME/LYMPH: No easy bruising, or bleeding gums  ALLERGY AND IMMUNOLOGIC: No hives or eczema	    MEDICATIONS  (STANDING):  albuterol    90 MICROgram(s) HFA Inhaler 2 Puff(s) Inhalation every 8 hours  albuterol/ipratropium for Nebulization 3 milliLiter(s) Nebulizer every 6 hours  amLODIPine   Tablet 10 milliGRAM(s) Oral daily  atorvastatin 40 milliGRAM(s) Oral at bedtime  carvedilol 12.5 milliGRAM(s) Oral every 12 hours  dextrose 5%. 1000 milliLiter(s) (50 mL/Hr) IV Continuous <Continuous>  dextrose 5%. 1000 milliLiter(s) (100 mL/Hr) IV Continuous <Continuous>  dextrose 50% Injectable 12.5 Gram(s) IV Push once  dextrose 50% Injectable 25 Gram(s) IV Push once  dextrose 50% Injectable 25 Gram(s) IV Push once  furosemide    Tablet 40 milliGRAM(s) Oral daily  glucagon  Injectable 1 milliGRAM(s) IntraMuscular once  heparin   Injectable 5000 Unit(s) SubCutaneous every 12 hours  hydrALAZINE 25 milliGRAM(s) Oral three times a day  insulin lispro (ADMELOG) corrective regimen sliding scale   SubCutaneous every 6 hours  isosorbide   dinitrate Tablet (ISORDIL) 20 milliGRAM(s) Oral three times a day  melatonin 3 milliGRAM(s) Oral at bedtime  pancrelipase (VIOKACE) for Occluded enteral feeding tubes 1 Tablet(s) Enteral Tube once  pantoprazole   Suspension 40 milliGRAM(s) Enteral Tube daily  polyethylene glycol 3350 17 Gram(s) Oral daily  povidone iodine 10% Solution 1 Application(s) Topical daily  QUEtiapine 25 milliGRAM(s) Oral daily  QUEtiapine 50 milliGRAM(s) Oral at bedtime  sodium bicarbonate 650 milliGRAM(s) Oral three times a day  zinc oxide 20% Ointment 1 Application(s) Topical two times a day    MEDICATIONS  (PRN):  acetaminophen   Oral Liquid .. 650 milliGRAM(s) Oral every 6 hours PRN Temp greater or equal to 38C (100.4F), Moderate Pain (4 - 6)  dextrose Oral Gel 15 Gram(s) Oral once PRN Blood Glucose LESS THAN 70 milliGRAM(s)/deciliter  haloperidol    Injectable 2 milliGRAM(s) IntraMuscular once PRN Agitation      Vital Signs Last 24 Hrs  T(C): 36.7 (18 Sep 2024 05:10), Max: 36.8 (17 Sep 2024 20:03)  T(F): 98.1 (18 Sep 2024 05:10), Max: 98.2 (17 Sep 2024 20:03)  HR: 86 (18 Sep 2024 05:10) (77 - 92)  BP: 135/80 (18 Sep 2024 05:10) (117/72 - 151/85)  BP(mean): --  RR: 18 (18 Sep 2024 05:10) (18 - 18)  SpO2: 96% (18 Sep 2024 05:10) (96% - 97%)    Parameters below as of 18 Sep 2024 05:10  Patient On (Oxygen Delivery Method): room air      CAPILLARY BLOOD GLUCOSE      POCT Blood Glucose.: 164 mg/dL (18 Sep 2024 05:21)  POCT Blood Glucose.: 110 mg/dL (17 Sep 2024 23:59)  POCT Blood Glucose.: 112 mg/dL (17 Sep 2024 16:56)  POCT Blood Glucose.: 152 mg/dL (17 Sep 2024 12:10)    I&O's Summary    16 Sep 2024 07:01  -  17 Sep 2024 07:00  --------------------------------------------------------  IN: 1211 mL / OUT: 0 mL / NET: 1211 mL          Appearance: Normal	  HEENT:   Normal oral mucosa, PERRL, EOMI	  Lymphatic: No lymphadenopathy  Cardiovascular: Normal S1 S2, No JVD  Respiratory: Lungs clear to auscultation	  Gastrointestinal:  Soft, Non-tender, + BS	  Skin: No rash, No ecchymoses	  Extremities:     LABS:                        9.3    11.68 )-----------( 335      ( 17 Sep 2024 06:41 )             28.9     09-17    137  |  103  |  62[H]  ----------------------------<  89  5.4[H]   |  22  |  2.20[H]    Ca    8.1[L]      17 Sep 2024 06:41            Urinalysis Basic - ( 17 Sep 2024 06:41 )    Color: x / Appearance: x / SG: x / pH: x  Gluc: 89 mg/dL / Ketone: x  / Bili: x / Urobili: x   Blood: x / Protein: x / Nitrite: x   Leuk Esterase: x / RBC: x / WBC x   Sq Epi: x / Non Sq Epi: x / Bacteria: x              Thyroid Stimulating Hormone, Serum: 2.73 uIU/mL (09-10 @ 07:22)          Consultant(s) Notes Reviewed:      Care Discussed with Consultants/Other Providers:

## 2024-09-18 NOTE — PROGRESS NOTE ADULT - PROBLEM SELECTOR PROBLEM 2
Acute hypoxemic respiratory failure due to COVID-19

## 2024-09-18 NOTE — DISCHARGE NOTE NURSING/CASE MANAGEMENT/SOCIAL WORK - PATIENT PORTAL LINK FT
You can access the FollowMyHealth Patient Portal offered by [facility] by registering at the following website: [email or web address]. By joining [employer]’s FollowMyHealth portal, you will also be able to view your health information using other applications (apps) compatible with our system.

## 2024-09-18 NOTE — PROGRESS NOTE ADULT - ASSESSMENT
49-year-old male     h/o  HTN/ HLD. DM ,   CVA with right sided hemiplegia, non verbal, dysphagia ,has  PEG tube, recently treated pneumonia with meropenem (8/27-9/2) brought in by EMS from [facility] for difficulty breathing and hypoxia 85%   per   nursing home , pt has   cpvid   QUITA      sob/  hypoxia,  from covid/ pna/  and  chf/ acute  diastolic     prior  h/o  pna's/  suspect  pt has   c/c  aspiration// pna/  cxr . infection  vx  fluid  overload       on   remdidvir/  decadron/   was  on  iv zosyn    per  id  dr raya bedoya,  no  sepsis,  and  no  need  for  ab     gi  eval.    QUITA.  f/p  by enrrique;l  d r ali      CVA,  non verbal, /  r hemiplegia,  bed  bound nal  quadriplegia         follow  fs.  endo  d r millan     quita, resolving     c/c  aspiration. despite  peg      ct ca/p.  mild  goo,  r pl  effusion,  on iv lasix.  fluid  overload. ?  acute   systolic   chf/   seen by  pulm/     echo  ,  ef  40,      dermatitis/  moisture, on back. on zinx c oxide/  fungal  discolore d toes,  no rx     meds  adjusted  by card ,  , on coreg. / pt not  an ideal  candidate  for  intervention    dvt  ppx     per  floor,  n/ home  will  only  accept   today    bmp,  pending/  d/c plans        pt  is  prior dbr/dni/  epr  n/home paper            49-year-old male     h/o  HTN/ HLD. DM ,   CVA with right sided hemiplegia, non verbal, dysphagia ,has  PEG tube, recently treated pneumonia with meropenem (8/27-9/2) brought in by EMS from [facility] for difficulty breathing and hypoxia 85%   per   nursing home , pt has   cpvid   QUITA      sob/  hypoxia,  from covid/ pna/  and  chf/ acute  diastolic     prior  h/o  pna's/  suspect  pt has   c/c  aspiration// pna/  cxr . infection  vx  fluid  overload       on   remdidvir/  decadron/   was  on  iv zosyn    per  id  dr raya bedoya,  no  sepsis,  and  no  need  for  ab     gi  eval.    QUITA.  f/p  by enrrique;l  d r ali      CVA,  non verbal, /  r hemiplegia,  bed  bound nal  quadriplegia         follow  fs.  endo  d r millan     quita, resolving     c/c  aspiration. despite  peg      ct ca/p.  mild  goo,  r pl  effusion,  on iv lasix.  fluid  overload. ?  acute   systolic   chf/   seen by  pulm/     echo  ,  ef  40,      dermatitis/  moisture, on back. on zinx c oxide/  fungal  discolore d toes,  no rx     meds  adjusted  by card ,  , on coreg. / pt not  an ideal  candidate  for  intervention    dvt  ppx     per  floor,  n/ home  will  only  accept   today     d/c plans  today  to  n/  home        pt  is  prior dbr/dni/  epr  n/home paper            49-year-old male     h/o  HTN/ HLD. DM ,   CVA with right sided hemiplegia, non verbal, dysphagia ,has  PEG tube, recently treated pneumonia with meropenem (8/27-9/2) brought in by EMS from [facility] for difficulty breathing and hypoxia 85%   per   nursing home , pt has   cpvid   QUITA      sob/  hypoxia,  from covid/ pna/  and  chf/ acute  diastolic     prior  h/o  pna's/  suspect  pt has   c/c  aspiration// pna/  cxr . infection  vx  fluid  overload       on   remdidvir/  decadron/   was  on  iv zosyn    per  id  dr raya bedoya,  no  sepsis,  and  no  need  for  ab     gi  eval.    QUITA.  f/p  by enrrique;l  d r ali      CVA,  non verbal, /  r hemiplegia,  bed  bound nal  quadriplegia         follow  fs.  endo  d r millan     quita, resolving     c/c  aspiration. despite  peg      ct ca/p.  mild  goo,  r pl  effusion,  on iv lasix.  fluid  overload. ?  acute   systolic   chf/   seen by  pulm/     echo  ,  ef  40,      dermatitis/  moisture, on back. on zinx c oxide/  fungal  discolore d toes,  no rx     meds  adjusted  by card ,  , on coreg. / pt not  an ideal  candidate  for  intervention    dvt  ppx     per  floor,  n/ home  will  only  accept   today     d/c plans  today  to  n/  home/  discussed  with  team PK        pt  is  prior dbr/dni/  epr  n/home paper            23.5

## 2024-09-18 NOTE — PROGRESS NOTE ADULT - SUBJECTIVE AND OBJECTIVE BOX
Date of Service: 09-18-24 @ 08:05           CARDIOLOGY     PROGRESS  NOTE   ________________________________________________    CHIEF COMPLAINT:Patient is a 49y old  Male who presents with a chief complaint of SOB (18 Sep 2024 06:58)  no complain  	  REVIEW OF SYSTEMS:  CONSTITUTIONAL: No fever, weight loss, or fatigue  EYES: No eye pain, visual disturbances, or discharge  ENT:  No difficulty hearing, tinnitus, vertigo; No sinus or throat pain  NECK: No pain or stiffness  RESPIRATORY: No cough, wheezing, chills or hemoptysis; No Shortness of Breath  CARDIOVASCULAR: No chest pain, palpitations, passing out, dizziness, or leg swelling  GASTROINTESTINAL: No abdominal or epigastric pain. No nausea, vomiting, or hematemesis; No diarrhea or constipation. No melena or hematochezia.  GENITOURINARY: No dysuria, frequency, hematuria, or incontinence  NEUROLOGICAL: No headaches, memory loss, loss of strength, numbness, or tremors  SKIN: No itching, burning, rashes, or lesions   LYMPH Nodes: No enlarged glands  ENDOCRINE: No heat or cold intolerance; No hair loss  MUSCULOSKELETAL: No joint pain or swelling; No muscle, back, or extremity pain  PSYCHIATRIC: No depression, anxiety, mood swings, or difficulty sleeping  HEME/LYMPH: No easy bruising, or bleeding gums  ALLERGY AND IMMUNOLOGIC: No hives or eczema	    [ ] All others negative	  [x ] Unable to obtain    PHYSICAL EXAM:  T(C): 36.7 (09-18-24 @ 05:10), Max: 36.8 (09-17-24 @ 20:03)  HR: 86 (09-18-24 @ 05:10) (77 - 92)  BP: 135/80 (09-18-24 @ 05:10) (117/72 - 151/85)  RR: 18 (09-18-24 @ 05:10) (18 - 18)  SpO2: 96% (09-18-24 @ 05:10) (96% - 97%)  Wt(kg): --  I&O's Summary      Appearance: Normal	  HEENT:   Normal oral mucosa, PERRL, EOMI	  Lymphatic: No lymphadenopathy  Cardiovascular: Normal S1 S2, No JVD, + murmurs, No edema  Respiratory:rhonchi  Psychiatry: non verbal  Gastrointestinal:  Soft, Non-tender, + BS	  Skin: No rashes, No ecchymoses, No cyanosis	  Extremities: Normal range of motion, No clubbing, cyanosis or edema  Vascular: Peripheral pulses palpable 2+ bilaterally    MEDICATIONS  (STANDING):  albuterol    90 MICROgram(s) HFA Inhaler 2 Puff(s) Inhalation every 8 hours  albuterol/ipratropium for Nebulization 3 milliLiter(s) Nebulizer every 6 hours  amLODIPine   Tablet 10 milliGRAM(s) Oral daily  atorvastatin 40 milliGRAM(s) Oral at bedtime  carvedilol 12.5 milliGRAM(s) Oral every 12 hours  dextrose 5%. 1000 milliLiter(s) (50 mL/Hr) IV Continuous <Continuous>  dextrose 5%. 1000 milliLiter(s) (100 mL/Hr) IV Continuous <Continuous>  dextrose 50% Injectable 12.5 Gram(s) IV Push once  dextrose 50% Injectable 25 Gram(s) IV Push once  dextrose 50% Injectable 25 Gram(s) IV Push once  furosemide    Tablet 40 milliGRAM(s) Oral daily  glucagon  Injectable 1 milliGRAM(s) IntraMuscular once  heparin   Injectable 5000 Unit(s) SubCutaneous every 12 hours  hydrALAZINE 25 milliGRAM(s) Oral three times a day  insulin lispro (ADMELOG) corrective regimen sliding scale   SubCutaneous every 6 hours  isosorbide   dinitrate Tablet (ISORDIL) 20 milliGRAM(s) Oral three times a day  melatonin 3 milliGRAM(s) Oral at bedtime  pancrelipase (VIOKACE) for Occluded enteral feeding tubes 1 Tablet(s) Enteral Tube once  pantoprazole   Suspension 40 milliGRAM(s) Enteral Tube daily  polyethylene glycol 3350 17 Gram(s) Oral daily  povidone iodine 10% Solution 1 Application(s) Topical daily  QUEtiapine 25 milliGRAM(s) Oral daily  QUEtiapine 50 milliGRAM(s) Oral at bedtime  sodium bicarbonate 650 milliGRAM(s) Oral three times a day  zinc oxide 20% Ointment 1 Application(s) Topical two times a day      TELEMETRY: 	    ECG:  	  RADIOLOGY:  OTHER: 	  	  LABS:	 	    CARDIAC MARKERS:                                9.3    11.68 )-----------( 335      ( 17 Sep 2024 06:41 )             28.9     09-17    137  |  103  |  62[H]  ----------------------------<  89  5.4[H]   |  22  |  2.20[H]    Ca    8.1[L]      17 Sep 2024 06:41      proBNP:   Lipid Profile:   HgA1c:   TSH: Thyroid Stimulating Hormone, Serum: 2.73 uIU/mL (09-10 @ 07:22)          Assessment and plan  ---------------------------  49-year-old male with past medical history of diabetes, hypertension, HLD. CVA with right sided hemiplegia, non verbal, dysphagia on PEG tube, recently treated pneumonia with meropenem (8/27-9/2) brought in by EMS from [facility] for difficulty breathing and hypoxia 85% RA, difficulty breathing per EMS and nursing home documentation.  Patient unable to provide additional history.     pt with sig medical and cardiac hx with increasing sob, COVID +, recently treated with pneumoniae  check d dimer if elevated , vq scan to r/o PE  echo  ID eval RDV increase creatinine renal eval  pt with hx of PAF ?AC will discus with Dr Prasad  CAD/ ASHD continue cardiac meds will adjust  increase bp will adjust meds, may increase hydralazine dose  ID noted   CKD continue to observe , on sodium bicarb  renal ultrasound  dvt prophylaxis, check d dimer noted  awaiting echo  chest ct with moderate R  pleural effusion, awaiting echo, may need therapeutic and diagnostic thoracentesis  peg feeding as per medicine  may consider to dc ivf, free water via the peg  bp is better controlled with increase hydralazine dose  may add diuretics will discuss with renal  check lytes  still with increase bp, dc labetalol start on coreg 12.5 mg bid  bp is better controlled with Coreg, echo noted with EF 40%, not a candidate for aggressive measures  pleural effusion ?chf , Renal comments about adding diuretics, increase lasix dose  increase hydralazine or  coreg for better bp control, bp is well controlled today  renal function needs to be followed closely. continue  diuretics  fu the chest x ray in few weeks  fu K level, ?add lokelma as poer renal  bp is better controlled

## 2024-09-18 NOTE — PROGRESS NOTE ADULT - ASSESSMENT
49-year-old male h/o  HTN/ HLD. DM ,   CVA with right sided hemiplegia, non verbal, dysphagia ,has  PEG tube, recently treated pneumonia brought in by EMS from [facility] for difficulty breathing and hypoxia   per   nursing home , pt has   covid   QUITA    Assessment  Hyperglycemia: 49y Male with hyperglycemias, being started on tube feeds via PEG tube, A1c stable, not on DM meds, started on sliding scale now.  Glucose stable on bolus tube feedings  COVID+: on medications, on isolation , monitored.  HTN: on antihypertensive medications, monitored, asymptomatic.    Discussed plan and management wit Dr Mau León MD  Cell: [phone]  Office: [phone]               49-year-old male h/o  HTN/ HLD. DM ,   CVA with right sided hemiplegia, non verbal, dysphagia ,has  PEG tube, recently treated pneumonia brought in by EMS from [facility] for difficulty breathing and hypoxia   per   nursing home , pt has   covid   QUITA      Assessment  Hyperglycemia: 49y Male with hyperglycemias, being started on tube feeds via PEG tube, A1c stable, not on DM meds, started on sliding scale now.  Glucose stable on bolus tube feedings  COVID+: on medications, on isolation , monitored.  HTN: on antihypertensive medications, monitored, asymptomatic.    Discussed plan and management wit Dr Mau León MD  Cell: [phone]  Office: [phone]

## 2024-09-18 NOTE — PROGRESS NOTE ADULT - REASON FOR ADMISSION
SOB

## 2024-09-18 NOTE — PROGRESS NOTE ADULT - NS ATTEND AMEND GEN_ALL_CORE FT
48yM improved BMs  PEG GT feeds going on well
49yM Tx from [facility] and NH for hypoxia  PEG clogged and was unclogged  now on TF
Chart, labs, vitals, radiology reviewed. Above H&P reviewed and edited where appropriate. Agree with history and physical exam. Agree with assessment and plan. I reviewed the overnight course of events and discussed the care with the patient/ family. All the decisions in assessment and plan are made by me.

## 2024-09-18 NOTE — PROGRESS NOTE ADULT - PROBLEM SELECTOR PROBLEM 1
Hyperglycemia

## 2024-09-18 NOTE — PROGRESS NOTE ADULT - SUBJECTIVE AND OBJECTIVE BOX
NEPHROLOGY-[facility] [phone]        Patient seen and examined in bed.  He was the same         MEDICATIONS  (STANDING):  albuterol    90 MICROgram(s) HFA Inhaler 2 Puff(s) Inhalation every 8 hours  amLODIPine   Tablet 10 milliGRAM(s) Oral daily  atorvastatin 40 milliGRAM(s) Oral at bedtime  carvedilol 12.5 milliGRAM(s) Oral every 12 hours  dextrose 5%. 1000 milliLiter(s) (50 mL/Hr) IV Continuous <Continuous>  dextrose 5%. 1000 milliLiter(s) (100 mL/Hr) IV Continuous <Continuous>  dextrose 50% Injectable 12.5 Gram(s) IV Push once  dextrose 50% Injectable 25 Gram(s) IV Push once  dextrose 50% Injectable 25 Gram(s) IV Push once  furosemide    Tablet 40 milliGRAM(s) Oral daily  glucagon  Injectable 1 milliGRAM(s) IntraMuscular once  heparin   Injectable 5000 Unit(s) SubCutaneous every 12 hours  hydrALAZINE 25 milliGRAM(s) Oral three times a day  insulin lispro (ADMELOG) corrective regimen sliding scale   SubCutaneous every 6 hours  isosorbide   dinitrate Tablet (ISORDIL) 20 milliGRAM(s) Oral three times a day  melatonin 3 milliGRAM(s) Oral at bedtime  pancrelipase (VIOKACE) for Occluded enteral feeding tubes 1 Tablet(s) Enteral Tube once  pantoprazole   Suspension 40 milliGRAM(s) Enteral Tube daily  polyethylene glycol 3350 17 Gram(s) Oral daily  povidone iodine 10% Solution 1 Application(s) Topical daily  QUEtiapine 25 milliGRAM(s) Oral daily  QUEtiapine 50 milliGRAM(s) Oral at bedtime  sodium bicarbonate 650 milliGRAM(s) Oral three times a day  zinc oxide 20% Ointment 1 Application(s) Topical two times a day      VITAL:  T(C): , Max: 36.8 (09-17-24 @ 20:03)  T(F): , Max: 98.2 (09-17-24 @ 20:03)  HR: 86 (09-18-24 @ 05:10)  BP: 135/80 (09-18-24 @ 05:10)  BP(mean): --  RR: 18 (09-18-24 @ 05:10)  SpO2: 96% (09-18-24 @ 05:10)  Wt(kg): --    I and O's:        PHYSICAL EXAM:    Constitutional: NAD  Neck:  No JVD  Respiratory: CTAB/L  Cardiovascular: S1 and S2  Gastrointestinal: BS+, soft, NT/ND  Extremities: No peripheral edema  Neurological: A/O x 3, no focal deficits  Psychiatric: Normal mood, normal affect  : No [place]  Skin: No rashes  Access: Not applicable    LABS:                        9.3    11.68 )-----------( 335      ( 17 Sep 2024 06:41 )             28.9     09-18    140  |  103  |  63[H]  ----------------------------<  146[H]  5.1   |  21[L]  |  2.33[H]    Ca    8.4      18 Sep 2024 07:52            Urine Studies:  Urinalysis Basic - ( 18 Sep 2024 07:52 )    Color: x / Appearance: x / SG: x / pH: x  Gluc: 146 mg/dL / Ketone: x  / Bili: x / Urobili: x   Blood: x / Protein: x / Nitrite: x   Leuk Esterase: x / RBC: x / WBC x   Sq Epi: x / Non Sq Epi: x / Bacteria: x            RADIOLOGY & ADDITIONAL STUDIES:

## 2024-09-18 NOTE — PROGRESS NOTE ADULT - ASSESSMENT
49-year-old male with past medical history of diabetes, hypertension, HLD. CVA with right sided hemiplegia, non verbal, dysphagia on PEG tube, recently treated pneumonia with meropenem (8/27-9/2) brought in by EMS from [facility] for difficulty breathing and hypoxia 85% RA, difficulty breathing per EMS and nursing home documentation. found to have COVID and QUITA vs advanced CKD. Nephrology consulted for QUITA vs CKD      CKD stage 3b   - Nephrotic syndrome   -Hyperkalemia n now high normal potassium      COVID virus     dysphagia  -s/p PEG      CVA  -R sided hemiplegia    RECOMMEND:  - continue Lasix 40 po daily now ;    - bicarb 650 TID through peg.    - SP Epogen 62150 unit x 1   -Dose Rx for CrCl 20-25mL/min;     - GIven overall state I suspect ARB + SGLT2 would be warranted here when the creatinine is stable - This can be started at his rehab as well and the creatinine will need to be trended        No renal objection to dc       Sayed [facility]   [phone]

## 2024-09-18 NOTE — DISCHARGE NOTE NURSING/CASE MANAGEMENT/SOCIAL WORK - NSDCPEFALRISK_GEN_ALL_CORE
For information on Fall & Injury Prevention, visit: [email or web address] OR  [email or web address] OR  https://www.cdc.gov/steadi/patient.html

## 2024-09-18 NOTE — PROGRESS NOTE ADULT - PROVIDER SPECIALTY LIST ADULT
Cardiology
Endocrinology
Gastroenterology
Gastroenterology
Infectious Disease
Infectious Disease
Internal Medicine
Nephrology
Cardiology
Endocrinology
Gastroenterology
Internal Medicine
Nephrology
Nephrology
Cardiology
Infectious Disease
Internal Medicine
Nephrology
Pulmonology
Endocrinology
Endocrinology
Internal Medicine
Nephrology
Pulmonology
Endocrinology

## 2024-09-18 NOTE — PROGRESS NOTE ADULT - PROBLEM SELECTOR PLAN 1
Will continue current insulin regimen for now.   Lantus stopped   Will continue monitoring FS, log, and glucose trends, will Follow up.
Will continue current insulin regimen for now.   Lantus stopped   Will continue monitoring FS, log, and glucose trends, will Follow up.
Will continue current insulin regimen for now.   Lantus 8 units bedtime   Will continue monitoring FS, log, and glucose trends, will Follow up.
Will continue current insulin regimen for now.   Lantus 8 units bedtime   Will continue monitoring FS, log, and glucose trends, will Follow up.
Will continue current insulin regimen for now.   Lantus stopped   Will continue monitoring FS, log, and glucose trends, will Follow up.
Will continue current insulin regimen for now.   Will continue monitoring FS, log, and glucose trends, will Follow up.
Will continue current insulin regimen for now.   Will continue monitoring FS, log, and glucose trends, will Follow up.
Will continue current insulin regimen for now.   Lantus 8 units bedtime   Will continue monitoring FS, log, and glucose trends, will Follow up.
Will continue current insulin regimen for now.   Will continue monitoring FS, log, and glucose trends, will Follow up.

## 2024-09-18 NOTE — DISCHARGE NOTE NURSING/CASE MANAGEMENT/SOCIAL WORK - NSDPDISTO_GEN_ALL_CORE
[facility] placed a third call to Bettye today for full open of referral/assessment.   Bettye did answer.  She reports that she is interested in talking with [facility] but is unable to at this time.  She reports she was not paid by her employer and is trying to get a hold of them to get it straightened out.  She states she will, or could be, on/off the phone for a while trying to take care of this.  She states she has other errands to run as well, such as going to the pharmacy.   Bettye inquired if [facility] had looked into HDM's for her yet; [facility] replied that without completing the assessment and knowing the needs it would be hard for [facility] to make referral's to places for a patient.  Bettye voiced understanding.  [facility] encouraged Bettye to reach out to [facility] today once her phone call and errands were completed, as long as it was before end of work day.  Bettye was agreeable and has [facility] number.    [facility] will schedule Bettye for next week in case no call is received today.  
Long term rehab

## 2024-10-14 ENCOUNTER — EMERGENCY (EMERGENCY)
Facility: HOSPITAL | Age: 49
LOS: 1 days | Discharge: NURSING, REHAB OR LONG-TERM CARE | End: 2024-10-14
Attending: EMERGENCY MEDICINE
Payer: MEDICAID

## 2024-10-14 VITALS
RESPIRATION RATE: 18 BRPM | DIASTOLIC BLOOD PRESSURE: 67 MMHG | HEART RATE: 79 BPM | TEMPERATURE: 98 F | SYSTOLIC BLOOD PRESSURE: 123 MMHG | HEIGHT: 67 IN | OXYGEN SATURATION: 97 % | WEIGHT: 119.93 LBS

## 2024-10-14 DIAGNOSIS — Z93.1 GASTROSTOMY STATUS: Chronic | ICD-10-CM

## 2024-10-14 PROCEDURE — 43762 RPLC GTUBE NO REVJ TRC: CPT

## 2024-10-14 PROCEDURE — 99284 EMERGENCY DEPT VISIT MOD MDM: CPT | Mod: 25

## 2024-10-14 PROCEDURE — L8699: CPT

## 2024-10-14 PROCEDURE — 49465 FLUORO EXAM OF G/COLON TUBE: CPT

## 2024-10-14 NOTE — ED PROVIDER NOTE - PATIENT PORTAL LINK FT
You can access the FollowMyHealth Patient Portal offered by [facility] by registering at the following website: [email or web address]. By joining "[employer]’s FollowMyHealth portal, you will also be able to view your health information using other applications (apps) compatible with our system.

## 2024-10-14 NOTE — ED ADULT NURSE NOTE - MODE OF DISCHARGE
Blood pressures doing excellent on current medication.  Blood tests were reviewed.  No significant abnormality.  Continue present management.  
Cholesterol levels are good.  Reviewed recent blood tests.  Continue omega-3 fatty acid.  Low-cholesterol high-fiber diet.  Recheck in 6 months.  
History of atrial fibrillation.  No evidence of arrhythmia at this time.  Patient denies any symptoms.  Continue to monitor.  
History of vitamin D deficiency.  Recommend continue to monitor.  We will recheck vitamin D at next visit.  
Patient continues to follow-up with dermatologist.  History of basal cell carcinoma.  
Patient denies any episodes of flare.  Recommend continuing to monitor.  Continue Indocin for flares only.  
Patient denies any problems with urination.  Is been doing well.  Continue to monitor.  Follow-up if symptoms develop.  
Patient's fasting blood sugar and most recent test results was 125.  No A1c was performed at this time.  We will recheck an A1c with next lab in 6 months.  Encouraged him to work on his dietary habits, exercise, weight loss.  
Stretcher

## 2024-10-14 NOTE — ED PROVIDER NOTE - OBJECTIVE STATEMENT
49-year-old male with history of CVA with right-sided hemiaplasia, HTN, HLD, DM, baseline aphasia/dysphagia with PEG tube presenting to the emergency department for PEG tube dislodgment.  Spoke with facility reported patient was in shower when he noticed the PEG tube was dislodged.  Staff immediately placed straight cath.  Believes that PEG is a isolated G-tube 14 Estonian.  No record that G-tube required IR placement or endoscopic intervention by GI.  Patient otherwise without complaint.

## 2024-10-14 NOTE — ED PROVIDER NOTE - ATTENDING CONTRIBUTION TO CARE
I performed a history and physical exam of the patient and discussed their management with the resident. I reviewed the resident's note and agree with the documented findings and plan of care.  Loida Mae MD

## 2024-10-14 NOTE — ED PROVIDER NOTE - PROGRESS NOTE DETAILS
Satish Wesley MD, PGY2: Replaced G tube with 14 Fr PEG, +mireyami. Will get G tube study with contrast to ensure proper placement. If within place will DC back to facility. Satish Wesley MD PGY2: Patient reassessed, stable. XR normal for G tube placement. Will DC back with nonemergent.

## 2024-10-14 NOTE — ED ADULT NURSE NOTE - OBJECTIVE STATEMENT
49 year old male, A&Ox1 (self), bib EMS from nursing home for PEG tube dislodgement. Patient denies pain at this time. Patient poor historian. Heart rate regular. Respirations clear and equal bilaterally. Abdomen soft, nontender and nondistended. PEG tube no longer patent. Peripheral pulses strong and equal bilaterally. 49 year old male, A&Ox1 (self), bib EMS from nursing home for PEG tube dislodgement. Patient denies pain at this time. Patient poor historian. Heart rate regular. Respirations clear and equal bilaterally. Abdomen soft, nontender and nondistended. PEG tube no longer patent. Peripheral pulses strong and equal bilaterally, currently has straight cath tube in place to keep stoma open, pt with R hemiparesis from old stroke, LUE/LE - moves on commands, +expressive aphasia, able to follow simple commands

## 2024-10-14 NOTE — ED PROVIDER NOTE - NSFOLLOWUPINSTRUCTIONS_ED_ALL_ED_FT
You were seen in the ED today for dislodged PEG tube.    A 14 french PEG tube was placed and confirmed with XRAY studies.     Follow up with your primary physician in 1-2 days. If needed call [phone] to find a primary care physician or call  [phone] to schedule an appointment with the general medicine.       If you experience any of the following please return to the ED:  - PEG tube dislodgement  - Clogged PEG tube that's unable to be flushed    1. TAKE ALL MEDICATIONS AS DIRECTED.    2. FOR PAIN OR FEVER YOU CAN TAKE IBUPROFEN (MOTRIN, ADVIL) OR ACETAMINOPHEN (TYLENOL) AS NEEDED, AS DIRECTED ON PACKAGING.  3. FOLLOW UP WITH YOUR PRIMARY DOCTOR WITHIN 5 DAYS AS DIRECTED.  4. IF YOU HAD LABS OR IMAGING DONE, YOU WERE GIVEN COPIES OF ALL LABS AND/OR IMAGING RESULTS FROM YOUR ER VISIT--PLEASE TAKE THEM WITH YOU TO YOUR FOLLOW UP APPOINTMENTS.  5. RETURN TO THE ER FOR ANY WORSENING SYMPTOMS OR CONCERNS.

## 2024-10-14 NOTE — ED PROVIDER NOTE - CLINICAL SUMMARY MEDICAL DECISION MAKING FREE TEXT BOX
49-year-old male with history of CVA with right-sided hemiaplasia, HTN, HLD, DM, baseline aphasia/dysphagia with PEG tube presenting to the emergency department for PEG tube dislodgment.  Spoke with facility reported patient was in shower when he noticed the PEG tube was dislodged.  Staff immediately placed straight cath.  Believes that PEG is a isolated G-tube 14 Chinese.  No record that G-tube required IR placement or endoscopic intervention by GI.  Patient otherwise without complaint. Afebrile. Hemodynamically stable. No acute focal neuro deficits behind baseline rt hemiplegia. No evidence of trauma. Abdomen with straight cath maintaining tract. Will attempt replacement and confirm with G tube xray.

## 2024-10-14 NOTE — ED PROVIDER NOTE - CHIEF COMPLAINT
The patient is a 49y Male complaining of  The patient is a 49y Male complaining of PEG dislodgement.

## 2024-10-14 NOTE — ED PROVIDER NOTE - PHYSICAL EXAMINATION
DISPLAY PLAN FREE TEXT GEN: Patient awake and alert. No acute distress, non-toxic. Nonverbal.   Head: Normocephalic, atraumatic.  Neck: Nontender, full ROM.   Eyes: PERRLA b/l. EOMI, no scleral icterus, no conjunctival injection. Moist mucous membranes.  CARDIAC: RRR. Normal S1, S2. No murmur, rubs, or gallops. No peripheral edema noted.  PULM: Speaking in full sentences. CTA B/L no wheeze, rales or rhonchi. No signs of respiratory distress, no accessory muscle usage or nasal flaring.  ABD: Soft, nontender, nondistended. No rebound, no involuntary guarding. PEG displaced w/ straight cath in tract.   NEURO: Alert and oriented, right hemiplegia/contracted at baseline, moving left extremities.  SKIN: Warm, dry, no rash, no lesions, no open wounds. No urticaria. No jaundice.

## 2024-10-14 NOTE — ED ADULT NURSE NOTE - NSFALLHARMRISKINTERV_ED_ALL_ED
Assistance OOB with selected safe patient handling equipment if applicable/Assistance with ambulation/Communicate risk of Fall with Harm to all staff, patient, and family/Monitor gait and stability/Provide visual cue: red socks, yellow wristband, yellow gown, etc/Reinforce activity limits and safety measures with patient and family/Bed in lowest position, wheels locked, appropriate side rails in place/Call bell, personal items and telephone in reach/Instruct patient to call for assistance before getting out of bed/chair/stretcher/Non-slip footwear applied when patient is off stretcher/[place] to call system/Physically safe environment - no spills, clutter or unnecessary equipment/Purposeful Proactive Rounding/Room/bathroom lighting operational, light cord in reach

## 2024-10-15 VITALS
RESPIRATION RATE: 16 BRPM | SYSTOLIC BLOOD PRESSURE: 143 MMHG | HEART RATE: 78 BPM | DIASTOLIC BLOOD PRESSURE: 75 MMHG | TEMPERATURE: 98 F | OXYGEN SATURATION: 97 %

## 2025-02-14 ENCOUNTER — EMERGENCY (EMERGENCY)
Facility: HOSPITAL | Age: 50
LOS: 1 days | Discharge: HOME | End: 2025-02-14
Attending: EMERGENCY MEDICINE
Payer: MEDICAID

## 2025-02-14 VITALS
HEART RATE: 82 BPM | OXYGEN SATURATION: 99 % | HEIGHT: 66 IN | SYSTOLIC BLOOD PRESSURE: 147 MMHG | TEMPERATURE: 99 F | WEIGHT: 115.08 LBS | RESPIRATION RATE: 19 BRPM | DIASTOLIC BLOOD PRESSURE: 70 MMHG

## 2025-02-14 DIAGNOSIS — Z93.1 GASTROSTOMY STATUS: Chronic | ICD-10-CM

## 2025-02-14 LAB
ALBUMIN SERPL ELPH-MCNC: 3.4 G/DL — SIGNIFICANT CHANGE UP (ref 3.3–5)
ALP SERPL-CCNC: 68 U/L — SIGNIFICANT CHANGE UP (ref 40–120)
ALT FLD-CCNC: 17 U/L — SIGNIFICANT CHANGE UP (ref 10–45)
ANION GAP SERPL CALC-SCNC: 15 MMOL/L — SIGNIFICANT CHANGE UP (ref 5–17)
APPEARANCE UR: CLEAR — SIGNIFICANT CHANGE UP
APTT BLD: 33.3 SEC — SIGNIFICANT CHANGE UP (ref 24.5–35.6)
AST SERPL-CCNC: 25 U/L — SIGNIFICANT CHANGE UP (ref 10–40)
BASOPHILS # BLD AUTO: 0.03 K/UL — SIGNIFICANT CHANGE UP (ref 0–0.2)
BASOPHILS NFR BLD AUTO: 0.4 % — SIGNIFICANT CHANGE UP (ref 0–2)
BILIRUB SERPL-MCNC: 0.5 MG/DL — SIGNIFICANT CHANGE UP (ref 0.2–1.2)
BILIRUB UR-MCNC: NEGATIVE — SIGNIFICANT CHANGE UP
BUN SERPL-MCNC: 50 MG/DL — HIGH (ref 7–23)
CALCIUM SERPL-MCNC: 8.5 MG/DL — SIGNIFICANT CHANGE UP (ref 8.4–10.5)
CHLORIDE SERPL-SCNC: 107 MMOL/L — SIGNIFICANT CHANGE UP (ref 96–108)
CO2 SERPL-SCNC: 19 MMOL/L — LOW (ref 22–31)
COLOR SPEC: YELLOW — SIGNIFICANT CHANGE UP
CREAT SERPL-MCNC: 2.84 MG/DL — HIGH (ref 0.5–1.3)
DIFF PNL FLD: NEGATIVE — SIGNIFICANT CHANGE UP
EGFR: 27 ML/MIN/1.73M2 — LOW
EOSINOPHIL # BLD AUTO: 0.22 K/UL — SIGNIFICANT CHANGE UP (ref 0–0.5)
EOSINOPHIL NFR BLD AUTO: 3.2 % — SIGNIFICANT CHANGE UP (ref 0–6)
FLUAV AG NPH QL: DETECTED
FLUBV AG NPH QL: SIGNIFICANT CHANGE UP
GLUCOSE SERPL-MCNC: 92 MG/DL — SIGNIFICANT CHANGE UP (ref 70–99)
GLUCOSE UR QL: NEGATIVE MG/DL — SIGNIFICANT CHANGE UP
HCT VFR BLD CALC: 23 % — LOW (ref 39–50)
HGB BLD-MCNC: 7.6 G/DL — LOW (ref 13–17)
IMM GRANULOCYTES NFR BLD AUTO: 0.4 % — SIGNIFICANT CHANGE UP (ref 0–0.9)
INR BLD: 0.91 RATIO — SIGNIFICANT CHANGE UP (ref 0.85–1.16)
KETONES UR-MCNC: NEGATIVE MG/DL — SIGNIFICANT CHANGE UP
LEUKOCYTE ESTERASE UR-ACNC: NEGATIVE — SIGNIFICANT CHANGE UP
LYMPHOCYTES # BLD AUTO: 2.01 K/UL — SIGNIFICANT CHANGE UP (ref 1–3.3)
LYMPHOCYTES # BLD AUTO: 29.5 % — SIGNIFICANT CHANGE UP (ref 13–44)
MCHC RBC-ENTMCNC: 29.6 PG — SIGNIFICANT CHANGE UP (ref 27–34)
MCHC RBC-ENTMCNC: 33 G/DL — SIGNIFICANT CHANGE UP (ref 32–36)
MCV RBC AUTO: 89.5 FL — SIGNIFICANT CHANGE UP (ref 80–100)
MONOCYTES # BLD AUTO: 0.78 K/UL — SIGNIFICANT CHANGE UP (ref 0–0.9)
MONOCYTES NFR BLD AUTO: 11.4 % — SIGNIFICANT CHANGE UP (ref 2–14)
NEUTROPHILS # BLD AUTO: 3.75 K/UL — SIGNIFICANT CHANGE UP (ref 1.8–7.4)
NEUTROPHILS NFR BLD AUTO: 55.1 % — SIGNIFICANT CHANGE UP (ref 43–77)
NITRITE UR-MCNC: NEGATIVE — SIGNIFICANT CHANGE UP
NRBC BLD AUTO-RTO: 0 /100 WBCS — SIGNIFICANT CHANGE UP (ref 0–0)
PH UR: 6.5 — SIGNIFICANT CHANGE UP (ref 5–8)
PLATELET # BLD AUTO: 170 K/UL — SIGNIFICANT CHANGE UP (ref 150–400)
POTASSIUM SERPL-MCNC: 4 MMOL/L — SIGNIFICANT CHANGE UP (ref 3.5–5.3)
POTASSIUM SERPL-SCNC: 4 MMOL/L — SIGNIFICANT CHANGE UP (ref 3.5–5.3)
PROCALCITONIN SERPL-MCNC: 1.13 NG/ML — HIGH (ref 0.02–0.1)
PROT SERPL-MCNC: 6.2 G/DL — SIGNIFICANT CHANGE UP (ref 6–8.3)
PROT UR-MCNC: 300 MG/DL
PROTHROM AB SERPL-ACNC: 10.4 SEC — SIGNIFICANT CHANGE UP (ref 9.9–13.4)
RBC # BLD: 2.57 M/UL — LOW (ref 4.2–5.8)
RBC # FLD: 13.1 % — SIGNIFICANT CHANGE UP (ref 10.3–14.5)
RSV RNA NPH QL NAA+NON-PROBE: SIGNIFICANT CHANGE UP
SARS-COV-2 RNA SPEC QL NAA+PROBE: SIGNIFICANT CHANGE UP
SODIUM SERPL-SCNC: 141 MMOL/L — SIGNIFICANT CHANGE UP (ref 135–145)
SP GR SPEC: 1.01 — SIGNIFICANT CHANGE UP (ref 1–1.03)
UROBILINOGEN FLD QL: 0.2 MG/DL — SIGNIFICANT CHANGE UP (ref 0.2–1)
WBC # BLD: 6.82 K/UL — SIGNIFICANT CHANGE UP (ref 3.8–10.5)
WBC # FLD AUTO: 6.82 K/UL — SIGNIFICANT CHANGE UP (ref 3.8–10.5)

## 2025-02-14 PROCEDURE — 85730 THROMBOPLASTIN TIME PARTIAL: CPT

## 2025-02-14 PROCEDURE — 85610 PROTHROMBIN TIME: CPT

## 2025-02-14 PROCEDURE — 82435 ASSAY OF BLOOD CHLORIDE: CPT

## 2025-02-14 PROCEDURE — 87040 BLOOD CULTURE FOR BACTERIA: CPT

## 2025-02-14 PROCEDURE — 87086 URINE CULTURE/COLONY COUNT: CPT

## 2025-02-14 PROCEDURE — 85014 HEMATOCRIT: CPT

## 2025-02-14 PROCEDURE — 87637 SARSCOV2&INF A&B&RSV AMP PRB: CPT

## 2025-02-14 PROCEDURE — 80053 COMPREHEN METABOLIC PANEL: CPT

## 2025-02-14 PROCEDURE — 83605 ASSAY OF LACTIC ACID: CPT

## 2025-02-14 PROCEDURE — 82947 ASSAY GLUCOSE BLOOD QUANT: CPT

## 2025-02-14 PROCEDURE — 71045 X-RAY EXAM CHEST 1 VIEW: CPT

## 2025-02-14 PROCEDURE — 84145 PROCALCITONIN (PCT): CPT

## 2025-02-14 PROCEDURE — 82803 BLOOD GASES ANY COMBINATION: CPT

## 2025-02-14 PROCEDURE — 84132 ASSAY OF SERUM POTASSIUM: CPT

## 2025-02-14 PROCEDURE — 84295 ASSAY OF SERUM SODIUM: CPT

## 2025-02-14 PROCEDURE — 81001 URINALYSIS AUTO W/SCOPE: CPT

## 2025-02-14 PROCEDURE — 99284 EMERGENCY DEPT VISIT MOD MDM: CPT

## 2025-02-14 PROCEDURE — 99285 EMERGENCY DEPT VISIT HI MDM: CPT | Mod: 25

## 2025-02-14 PROCEDURE — 93005 ELECTROCARDIOGRAM TRACING: CPT

## 2025-02-14 PROCEDURE — 82330 ASSAY OF CALCIUM: CPT

## 2025-02-14 PROCEDURE — 85018 HEMOGLOBIN: CPT

## 2025-02-14 PROCEDURE — 85025 COMPLETE CBC W/AUTO DIFF WBC: CPT

## 2025-02-14 PROCEDURE — 71045 X-RAY EXAM CHEST 1 VIEW: CPT | Mod: 26

## 2025-02-14 PROCEDURE — 36000 PLACE NEEDLE IN VEIN: CPT

## 2025-02-14 RX ORDER — OSELTAMIVIR PHOSPHATE 75 MG/1
30 CAPSULE ORAL EVERY 24 HOURS
Refills: 0 | Status: DISCONTINUED | OUTPATIENT
Start: 2025-02-14 | End: 2025-02-18

## 2025-02-14 RX ADMIN — OSELTAMIVIR PHOSPHATE 30 MILLIGRAM(S): 75 CAPSULE ORAL at 22:40

## 2025-02-14 NOTE — ED PROVIDER NOTE - PATIENT PORTAL LINK FT
You can access the FollowMyHealth Patient Portal offered by [facility] by registering at the following website: [email or web address]. By joining [employer]’s FollowMyHealth portal, you will also be able to view your health information using other applications (apps) compatible with our system.

## 2025-02-14 NOTE — ED ADULT NURSE NOTE - NSFALLHARMRISKINTERV_ED_ALL_ED

## 2025-02-14 NOTE — ED PROVIDER NOTE - PROGRESS NOTE DETAILS
Spoke to emergency contact over the phone, updated that he is in the hospital.  Spoke that she will be admitted.  She expressed understanding.  Mark Montaño MD PGY-2

## 2025-02-14 NOTE — ED PROVIDER NOTE - NSFOLLOWUPINSTRUCTIONS_ED_ALL_ED_FT
You were evaluated in the Emergency Department today for your congestion, cough and fevers. Your evaluation suggests that your symptoms are most likely due to a viral illness, which will improve on its own with rest and fluids.    Please take your prescribed Tamiflu 30 mg once a day for the next 4 days.    Return to the Emergency Department if you experience worsening cough, fever 100.4 ° F or greater not controlled by Tylenol or Ibuprofen, recurrent vomiting, chest pain, shortness of breath, or any other concerning symptoms. You were evaluated in the Emergency Department today for your congestion, cough and fevers. Your evaluation suggests that your symptoms are most likely due to a viral illness, which will improve on its own with rest and fluids.    Please take your prescribed Tamiflu 30 mg once a day for the next 4 days.    A Naranjo catheter was placed because he was retaining 600 mL of urine, please follow-up with the urologist using the phone number below.    Return to the Emergency Department if you experience worsening cough, fever 100.4 ° F or greater not controlled by Tylenol or Ibuprofen, recurrent vomiting, chest pain, shortness of breath, or any other concerning symptoms.

## 2025-02-14 NOTE — ED ADULT NURSE NOTE - OBJECTIVE STATEMENT
Pt is a 50y/o M PMH aphasia, CVA(right sided hemiparesis), HLD, HTN, PAF, CKD3, DM2, peg tube arriving via EMS from [facility] for fever and desaturation to 82% along with respiratory distress. Pt was recently treated for pneumonia with meropenum, was found today with 02 sat 82% on RA, improved to 99% on 2L as per EMS,  Tested flu positive and was given empiric meropenem due to fevers today. Denies any pain anywhere. Upon physical assessment pt skin warm and dry,  airway patent. Pt is A&Ox1 currently denying any HA, visual deficits, SOB, cough, CP, palpitations, abd pain, urinary symptoms, n/v/d/c, fever/chills, weakness/fatigue. Pt ambulates with assistance at baseline. Bedrail's up and comfort measures provided Pt is a 50y/o M PMH aphasia, CVA(right sided hemiparesis), HLD, HTN, PAF, CKD3, DM2, peg tube arriving via EMS from [facility] for fever and desaturation to 82% along with respiratory distress. Pt was recently treated for pneumonia with meropenum, was found today with 02 sat 82% on RA, improved to 99% on 2L as per EMS,  Tested flu positive and was given empiric meropenem due to fevers today. Arrive with PICC line placed on L arm, not flushing, meeting resistance. Denies any pain anywhere. Upon physical assessment pt skin warm and dry,  airway patent. Pt is A&Ox1 currently denying any HA, visual deficits, SOB, cough, CP, palpitations, abd pain, urinary symptoms, n/v/d/c, fever/chills, weakness/fatigue. Pt ambulates with assistance at baseline. Bedrail's up and comfort measures provided

## 2025-02-14 NOTE — ED PROVIDER NOTE - CLINICAL SUMMARY MEDICAL DECISION MAKING FREE TEXT BOX
Attending note.  Patient was seen in room #7.  Patient was brought in by EMS from [facility].  Patient is nonverbal history from EMS they patient was found to have oxygen saturation 82% and respiratory distress.  He tested positive for influenza A today.  Patient has been on meropenem every 8 hours since February 13.  He has a prior stroke with right-sided hemiplegia and contraction.  He also has a history of intracranial hemorrhage, iron deficiency anemia, pleural effusion, CKD, respiratory failure, NSTEMI, paroxysmal atrial fibrillation, GERD, bipolar disorder, diabetic neuropathy, diabetic retinopathy, aphasia, seizures, hyperlipidemia, hypertension and diabetes.  He has no known allergies.  He is DNR/DNI per [facility] form transferred with patient.     ROS-patient is nonverbal.  PE-patient is alert in no acute distress.  Oxygen saturation is from 96-99% on room air.  There is no respiratory distress.  Patient has coarse breath sounds bilaterally.  Abdomen is slightly distended, but soft and nontender.  There is no guarding or rebound.  Patient has contracture of the right upper extremity and right lower extremity.  There is no leg edema.     A/P-patient transferred to hospital after found with oxygen saturation at 82% and respiratory distress.  Patient tested positive for influenza A today.  Chest x-ray, labs, sepsis workup.  Nasal viral swabs. The patient is a h/o  HTN/ HLD. DM ,   CVA with right sided hemiplegia, non verbal, dysphagia ,has  PEG tube, recently treated pneumonia with meropenem (8/27-9/2) Who presents from home with [facility] for fever and desaturation to 82% along with respiratory distress.  Tested flu positive and was given empiric meropenem due to fevers today.  Patient is unable to provide a clear history.  Also tried speaking to the patient in [place] and only thing is able to endorses that he had a fever.  Denies any pain anywhere.    On physical exam patient appears well, stating >95% on room air not in respiratory distress, anterior lung fields clear to auscultation, heart rate regular rate and rhythm no murmurs rubs or gallops, PEG tube in place, abdomen soft.    Concern for pneumonia versus viral syndrome.  Plan for chest x-ray Pro-Jairo, flu COVID.  Patient was given empiric antibiotics at nursing home, will plan to wait for chest x-ray to determine need for dosing for bacterial pneumonia versus treating as influenza.  Plan to evaluate for the source of fever plan for  UC.  Patient will be an admission.    Attending note.  Patient was seen in room #7.  Patient was brought in by EMS from [facility].  Patient is nonverbal history from EMS they patient was found to have oxygen saturation 82% and respiratory distress.  He tested positive for influenza A today.  Patient has been on meropenem every 8 hours since February 13.  He has a prior stroke with right-sided hemiplegia and contraction.  He also has a history of intracranial hemorrhage, iron deficiency anemia, pleural effusion, CKD, respiratory failure, NSTEMI, paroxysmal atrial fibrillation, GERD, bipolar disorder, diabetic neuropathy, diabetic retinopathy, aphasia, seizures, hyperlipidemia, hypertension and diabetes.  He has no known allergies.  He is DNR/DNI per [facility] form transferred with patient.     ROS-patient is nonverbal.  PE-patient is alert in no acute distress.  Oxygen saturation is from 96-99% on room air.  There is no respiratory distress.  Patient has coarse breath sounds bilaterally.  Abdomen is slightly distended, but soft and nontender.  There is no guarding or rebound.  Patient has contracture of the right upper extremity and right lower extremity.  There is no leg edema.     A/P-patient transferred to hospital after found with oxygen saturation at 82% and respiratory distress.  Patient tested positive for influenza A today.  Chest x-ray, labs, sepsis workup.  Nasal viral swabs.

## 2025-02-14 NOTE — ED ADULT NURSE REASSESSMENT NOTE - NS ED NURSE REASSESS COMMENT FT1
Indwelling urinary "taylor" catheter inserted using sterile technique. Procedure, risks, and benefits of catheter explained to patient, patient unable to verbalize understanding. Second RN present to confirm sterility. Pt tolerated well. Urinary catheter drained 400cc of yellow urine, no clots visualized. Bedside drainage to gravity. Stat lock in place. Pt cleaned changed and repositioned, no skin break down noted.

## 2025-02-15 VITALS
DIASTOLIC BLOOD PRESSURE: 80 MMHG | HEART RATE: 83 BPM | RESPIRATION RATE: 17 BRPM | TEMPERATURE: 99 F | OXYGEN SATURATION: 98 % | SYSTOLIC BLOOD PRESSURE: 169 MMHG

## 2025-02-15 LAB
BACTERIA # UR AUTO: NEGATIVE /HPF — SIGNIFICANT CHANGE UP
CAST: 3 /LPF — SIGNIFICANT CHANGE UP (ref 0–4)
CULTURE RESULTS: NO GROWTH — SIGNIFICANT CHANGE UP
HYALINE CASTS # UR AUTO: PRESENT
RBC CASTS # UR COMP ASSIST: 24 /HPF — HIGH (ref 0–4)
REVIEW: SIGNIFICANT CHANGE UP
SPECIMEN SOURCE: SIGNIFICANT CHANGE UP
SQUAMOUS # UR AUTO: 0 /HPF — SIGNIFICANT CHANGE UP (ref 0–5)
WBC UR QL: 0 /HPF — SIGNIFICANT CHANGE UP (ref 0–5)

## 2025-02-15 NOTE — ED ADULT NURSE REASSESSMENT NOTE - NS ED NURSE REASSESS COMMENT FT1
Report given to JING Perez at [facility]. No further interventions at this time.

## 2025-02-20 LAB
CULTURE RESULTS: SIGNIFICANT CHANGE UP
CULTURE RESULTS: SIGNIFICANT CHANGE UP
SPECIMEN SOURCE: SIGNIFICANT CHANGE UP
SPECIMEN SOURCE: SIGNIFICANT CHANGE UP